# Patient Record
Sex: MALE | Race: WHITE | Employment: UNEMPLOYED | URBAN - METROPOLITAN AREA
[De-identification: names, ages, dates, MRNs, and addresses within clinical notes are randomized per-mention and may not be internally consistent; named-entity substitution may affect disease eponyms.]

---

## 2017-07-24 ENCOUNTER — OFFICE VISIT (OUTPATIENT)
Dept: ORTHOPEDIC SURGERY | Age: 42
End: 2017-07-24

## 2017-07-24 VITALS
BODY MASS INDEX: 22.91 KG/M2 | HEART RATE: 89 BPM | WEIGHT: 160.05 LBS | DIASTOLIC BLOOD PRESSURE: 84 MMHG | SYSTOLIC BLOOD PRESSURE: 125 MMHG | HEIGHT: 70 IN

## 2017-07-24 DIAGNOSIS — M25.562 LEFT KNEE PAIN, UNSPECIFIED CHRONICITY: ICD-10-CM

## 2017-07-24 DIAGNOSIS — S86.112A GASTROCNEMIUS MUSCLE STRAIN, LEFT, INITIAL ENCOUNTER: Primary | ICD-10-CM

## 2017-07-24 PROCEDURE — G8420 CALC BMI NORM PARAMETERS: HCPCS | Performed by: ORTHOPAEDIC SURGERY

## 2017-07-24 PROCEDURE — 4004F PT TOBACCO SCREEN RCVD TLK: CPT | Performed by: ORTHOPAEDIC SURGERY

## 2017-07-24 PROCEDURE — 73564 X-RAY EXAM KNEE 4 OR MORE: CPT | Performed by: ORTHOPAEDIC SURGERY

## 2017-07-24 PROCEDURE — L1812 KO ELASTIC W/JOINTS PRE OTS: HCPCS | Performed by: ORTHOPAEDIC SURGERY

## 2017-07-24 PROCEDURE — 99214 OFFICE O/P EST MOD 30 MIN: CPT | Performed by: ORTHOPAEDIC SURGERY

## 2017-07-24 PROCEDURE — G8427 DOCREV CUR MEDS BY ELIG CLIN: HCPCS | Performed by: ORTHOPAEDIC SURGERY

## 2017-07-24 RX ORDER — MELOXICAM 15 MG/1
15 TABLET ORAL DAILY
Qty: 30 TABLET | Refills: 3 | Status: SHIPPED | OUTPATIENT
Start: 2017-07-24 | End: 2017-10-01 | Stop reason: ALTCHOICE

## 2018-12-30 ENCOUNTER — HOSPITAL ENCOUNTER (EMERGENCY)
Age: 43
Discharge: ANOTHER HOSPITAL | End: 2018-12-30
Attending: EMERGENCY MEDICINE
Payer: COMMERCIAL

## 2018-12-30 ENCOUNTER — APPOINTMENT (OUTPATIENT)
Dept: CT IMAGING | Age: 43
End: 2018-12-30
Payer: COMMERCIAL

## 2018-12-30 ENCOUNTER — HOSPITAL ENCOUNTER (INPATIENT)
Age: 43
LOS: 2 days | Discharge: HOME | DRG: 247 | End: 2019-01-01
Attending: INTERNAL MEDICINE | Admitting: FAMILY MEDICINE
Payer: COMMERCIAL

## 2018-12-30 ENCOUNTER — APPOINTMENT (OUTPATIENT)
Dept: GENERAL RADIOLOGY | Age: 43
End: 2018-12-30
Payer: COMMERCIAL

## 2018-12-30 VITALS
HEART RATE: 60 BPM | WEIGHT: 156 LBS | TEMPERATURE: 98.2 F | OXYGEN SATURATION: 97 % | BODY MASS INDEX: 22.33 KG/M2 | DIASTOLIC BLOOD PRESSURE: 85 MMHG | SYSTOLIC BLOOD PRESSURE: 125 MMHG | HEIGHT: 70 IN | RESPIRATION RATE: 16 BRPM

## 2018-12-30 DIAGNOSIS — R07.9 CHEST PAIN, UNSPECIFIED TYPE: Primary | ICD-10-CM

## 2018-12-30 LAB
A/G RATIO: 1.4 (ref 1.1–2.2)
ALBUMIN SERPL-MCNC: 4.5 G/DL (ref 3.4–5)
ALP BLD-CCNC: 61 U/L (ref 40–129)
ALT SERPL-CCNC: 24 U/L (ref 10–40)
ANION GAP SERPL CALCULATED.3IONS-SCNC: 12 MMOL/L (ref 3–16)
APTT: 32.3 SEC (ref 26–36)
AST SERPL-CCNC: 25 U/L (ref 15–37)
BASOPHILS ABSOLUTE: 0 K/UL (ref 0–0.2)
BASOPHILS RELATIVE PERCENT: 0.5 %
BILIRUB SERPL-MCNC: 0.5 MG/DL (ref 0–1)
BUN BLDV-MCNC: 10 MG/DL (ref 7–20)
CALCIUM SERPL-MCNC: 9.8 MG/DL (ref 8.3–10.6)
CHLORIDE BLD-SCNC: 102 MMOL/L (ref 99–110)
CO2: 26 MMOL/L (ref 21–32)
CREAT SERPL-MCNC: 0.8 MG/DL (ref 0.9–1.3)
D DIMER: <200 NG/ML DDU (ref 0–229)
EKG ATRIAL RATE: 69 BPM
EKG ATRIAL RATE: 69 BPM
EKG DIAGNOSIS: NORMAL
EKG DIAGNOSIS: NORMAL
EKG P AXIS: 59 DEGREES
EKG P AXIS: 59 DEGREES
EKG P-R INTERVAL: 164 MS
EKG P-R INTERVAL: 164 MS
EKG Q-T INTERVAL: 374 MS
EKG Q-T INTERVAL: 378 MS
EKG QRS DURATION: 86 MS
EKG QRS DURATION: 88 MS
EKG QTC CALCULATION (BAZETT): 400 MS
EKG QTC CALCULATION (BAZETT): 405 MS
EKG R AXIS: 44 DEGREES
EKG R AXIS: 44 DEGREES
EKG T AXIS: 66 DEGREES
EKG T AXIS: 69 DEGREES
EKG VENTRICULAR RATE: 69 BPM
EKG VENTRICULAR RATE: 69 BPM
EOSINOPHILS ABSOLUTE: 0.1 K/UL (ref 0–0.6)
EOSINOPHILS RELATIVE PERCENT: 0.6 %
GFR AFRICAN AMERICAN: >60
GFR NON-AFRICAN AMERICAN: >60
GLOBULIN: 3.3 G/DL
GLUCOSE BLD-MCNC: 166 MG/DL (ref 70–99)
HCT VFR BLD CALC: 44.6 % (ref 40.5–52.5)
HEMOGLOBIN: 14.5 G/DL (ref 13.5–17.5)
INR BLD: 1.05 (ref 0.86–1.14)
LIPASE: 56 U/L (ref 13–60)
LYMPHOCYTES ABSOLUTE: 1.6 K/UL (ref 1–5.1)
LYMPHOCYTES RELATIVE PERCENT: 15.7 %
MCH RBC QN AUTO: 29.3 PG (ref 26–34)
MCHC RBC AUTO-ENTMCNC: 32.6 G/DL (ref 31–36)
MCV RBC AUTO: 89.9 FL (ref 80–100)
MONOCYTES ABSOLUTE: 1 K/UL (ref 0–1.3)
MONOCYTES RELATIVE PERCENT: 10.1 %
NEUTROPHILS ABSOLUTE: 7.4 K/UL (ref 1.7–7.7)
NEUTROPHILS RELATIVE PERCENT: 73.1 %
PDW BLD-RTO: 14.8 % (ref 12.4–15.4)
PLATELET # BLD: 187 K/UL (ref 135–450)
PMV BLD AUTO: 8.7 FL (ref 5–10.5)
POTASSIUM SERPL-SCNC: 4.4 MMOL/L (ref 3.5–5.1)
PROTHROMBIN TIME: 12 SEC (ref 9.8–13)
RBC # BLD: 4.96 M/UL (ref 4.2–5.9)
SODIUM BLD-SCNC: 140 MMOL/L (ref 136–145)
TOTAL PROTEIN: 7.8 G/DL (ref 6.4–8.2)
TROPONIN: 0.02 NG/ML
TROPONIN: 0.4 NG/ML
TROPONIN: 0.43 NG/ML
WBC # BLD: 10.1 K/UL (ref 4–11)

## 2018-12-30 PROCEDURE — 36415 COLL VENOUS BLD VENIPUNCTURE: CPT

## 2018-12-30 PROCEDURE — B2111ZZ FLUOROSCOPY OF MULTIPLE CORONARY ARTERIES USING LOW OSMOLAR CONTRAST: ICD-10-PCS | Performed by: INTERNAL MEDICINE

## 2018-12-30 PROCEDURE — 93458 L HRT ARTERY/VENTRICLE ANGIO: CPT | Performed by: INTERNAL MEDICINE

## 2018-12-30 PROCEDURE — 92941 PRQ TRLML REVSC TOT OCCL AMI: CPT | Performed by: INTERNAL MEDICINE

## 2018-12-30 PROCEDURE — 83036 HEMOGLOBIN GLYCOSYLATED A1C: CPT

## 2018-12-30 PROCEDURE — 96366 THER/PROPH/DIAG IV INF ADDON: CPT

## 2018-12-30 PROCEDURE — 80061 LIPID PANEL: CPT

## 2018-12-30 PROCEDURE — 84484 ASSAY OF TROPONIN QUANT: CPT

## 2018-12-30 PROCEDURE — 80053 COMPREHEN METABOLIC PANEL: CPT

## 2018-12-30 PROCEDURE — 92978 ENDOLUMINL IVUS OCT C 1ST: CPT | Performed by: INTERNAL MEDICINE

## 2018-12-30 PROCEDURE — 6360000002 HC RX W HCPCS: Performed by: NURSE PRACTITIONER

## 2018-12-30 PROCEDURE — 027135Z DILATION OF CORONARY ARTERY, TWO ARTERIES WITH TWO DRUG-ELUTING INTRALUMINAL DEVICES, PERCUTANEOUS APPROACH: ICD-10-PCS | Performed by: INTERNAL MEDICINE

## 2018-12-30 PROCEDURE — 99285 EMERGENCY DEPT VISIT HI MDM: CPT

## 2018-12-30 PROCEDURE — 6360000004 HC RX CONTRAST MEDICATION: Performed by: EMERGENCY MEDICINE

## 2018-12-30 PROCEDURE — 2580000003 HC RX 258: Performed by: EMERGENCY MEDICINE

## 2018-12-30 PROCEDURE — 71275 CT ANGIOGRAPHY CHEST: CPT

## 2018-12-30 PROCEDURE — 85379 FIBRIN DEGRADATION QUANT: CPT

## 2018-12-30 PROCEDURE — 99255 IP/OBS CONSLTJ NEW/EST HI 80: CPT | Performed by: INTERNAL MEDICINE

## 2018-12-30 PROCEDURE — 96375 TX/PRO/DX INJ NEW DRUG ADDON: CPT

## 2018-12-30 PROCEDURE — C1725 CATH, TRANSLUMIN NON-LASER: HCPCS

## 2018-12-30 PROCEDURE — 2000000000 HC ICU R&B

## 2018-12-30 PROCEDURE — 83690 ASSAY OF LIPASE: CPT

## 2018-12-30 PROCEDURE — 85610 PROTHROMBIN TIME: CPT

## 2018-12-30 PROCEDURE — 93005 ELECTROCARDIOGRAM TRACING: CPT | Performed by: INTERNAL MEDICINE

## 2018-12-30 PROCEDURE — C1874 STENT, COATED/COV W/DEL SYS: HCPCS

## 2018-12-30 PROCEDURE — 71045 X-RAY EXAM CHEST 1 VIEW: CPT

## 2018-12-30 PROCEDURE — 99406 BEHAV CHNG SMOKING 3-10 MIN: CPT

## 2018-12-30 PROCEDURE — 2580000003 HC RX 258: Performed by: INTERNAL MEDICINE

## 2018-12-30 PROCEDURE — C1894 INTRO/SHEATH, NON-LASER: HCPCS

## 2018-12-30 PROCEDURE — C1887 CATHETER, GUIDING: HCPCS

## 2018-12-30 PROCEDURE — 2709999900 HC NON-CHARGEABLE SUPPLY

## 2018-12-30 PROCEDURE — 6360000002 HC RX W HCPCS: Performed by: EMERGENCY MEDICINE

## 2018-12-30 PROCEDURE — 99152 MOD SED SAME PHYS/QHP 5/>YRS: CPT | Performed by: INTERNAL MEDICINE

## 2018-12-30 PROCEDURE — 85730 THROMBOPLASTIN TIME PARTIAL: CPT

## 2018-12-30 PROCEDURE — 93005 ELECTROCARDIOGRAM TRACING: CPT | Performed by: EMERGENCY MEDICINE

## 2018-12-30 PROCEDURE — 93010 ELECTROCARDIOGRAM REPORT: CPT | Performed by: INTERNAL MEDICINE

## 2018-12-30 PROCEDURE — 6360000002 HC RX W HCPCS: Performed by: INTERNAL MEDICINE

## 2018-12-30 PROCEDURE — 6370000000 HC RX 637 (ALT 250 FOR IP): Performed by: INTERNAL MEDICINE

## 2018-12-30 PROCEDURE — 6370000000 HC RX 637 (ALT 250 FOR IP): Performed by: EMERGENCY MEDICINE

## 2018-12-30 PROCEDURE — B2151ZZ FLUOROSCOPY OF LEFT HEART USING LOW OSMOLAR CONTRAST: ICD-10-PCS | Performed by: INTERNAL MEDICINE

## 2018-12-30 PROCEDURE — C1769 GUIDE WIRE: HCPCS

## 2018-12-30 PROCEDURE — 2720000010 HC SURG SUPPLY STERILE

## 2018-12-30 PROCEDURE — 4A023N7 MEASUREMENT OF CARDIAC SAMPLING AND PRESSURE, LEFT HEART, PERCUTANEOUS APPROACH: ICD-10-PCS | Performed by: INTERNAL MEDICINE

## 2018-12-30 PROCEDURE — 85025 COMPLETE CBC W/AUTO DIFF WBC: CPT

## 2018-12-30 PROCEDURE — 96376 TX/PRO/DX INJ SAME DRUG ADON: CPT

## 2018-12-30 PROCEDURE — 85347 COAGULATION TIME ACTIVATED: CPT

## 2018-12-30 PROCEDURE — 96365 THER/PROPH/DIAG IV INF INIT: CPT

## 2018-12-30 PROCEDURE — 6370000000 HC RX 637 (ALT 250 FOR IP): Performed by: NURSE PRACTITIONER

## 2018-12-30 RX ORDER — SODIUM CHLORIDE 9 MG/ML
INJECTION, SOLUTION INTRAVENOUS CONTINUOUS
Status: DISPENSED | OUTPATIENT
Start: 2018-12-30 | End: 2018-12-31

## 2018-12-30 RX ORDER — NICOTINE 21 MG/24HR
1 PATCH, TRANSDERMAL 24 HOURS TRANSDERMAL DAILY
Status: DISCONTINUED | OUTPATIENT
Start: 2018-12-30 | End: 2019-01-01 | Stop reason: HOSPADM

## 2018-12-30 RX ORDER — EPTIFIBATIDE 0.75 MG/ML
11.3 INJECTION, SOLUTION INTRAVENOUS CONTINUOUS
Status: DISPENSED | OUTPATIENT
Start: 2018-12-30 | End: 2018-12-31

## 2018-12-30 RX ORDER — SODIUM CHLORIDE 0.9 % (FLUSH) 0.9 %
10 SYRINGE (ML) INJECTION PRN
Status: DISCONTINUED | OUTPATIENT
Start: 2018-12-30 | End: 2019-01-01 | Stop reason: HOSPADM

## 2018-12-30 RX ORDER — LORAZEPAM 1 MG/1
3 TABLET ORAL
Status: DISCONTINUED | OUTPATIENT
Start: 2018-12-30 | End: 2019-01-01 | Stop reason: HOSPADM

## 2018-12-30 RX ORDER — MORPHINE SULFATE 4 MG/ML
4 INJECTION, SOLUTION INTRAMUSCULAR; INTRAVENOUS ONCE
Status: COMPLETED | OUTPATIENT
Start: 2018-12-30 | End: 2018-12-30

## 2018-12-30 RX ORDER — SODIUM CHLORIDE 9 MG/ML
INJECTION, SOLUTION INTRAVENOUS CONTINUOUS
Status: DISCONTINUED | OUTPATIENT
Start: 2018-12-30 | End: 2018-12-30 | Stop reason: HOSPADM

## 2018-12-30 RX ORDER — LORAZEPAM 1 MG/1
4 TABLET ORAL
Status: DISCONTINUED | OUTPATIENT
Start: 2018-12-30 | End: 2019-01-01 | Stop reason: HOSPADM

## 2018-12-30 RX ORDER — HEPARIN SODIUM 1000 [USP'U]/ML
60 INJECTION, SOLUTION INTRAVENOUS; SUBCUTANEOUS ONCE
Status: COMPLETED | OUTPATIENT
Start: 2018-12-30 | End: 2018-12-30

## 2018-12-30 RX ORDER — MORPHINE SULFATE 2 MG/ML
2 INJECTION, SOLUTION INTRAMUSCULAR; INTRAVENOUS
Status: DISCONTINUED | OUTPATIENT
Start: 2018-12-30 | End: 2019-01-01 | Stop reason: HOSPADM

## 2018-12-30 RX ORDER — LORAZEPAM 2 MG/ML
3 INJECTION INTRAMUSCULAR
Status: DISCONTINUED | OUTPATIENT
Start: 2018-12-30 | End: 2019-01-01 | Stop reason: HOSPADM

## 2018-12-30 RX ORDER — ATORVASTATIN CALCIUM 40 MG/1
40 TABLET, FILM COATED ORAL NIGHTLY
Status: DISCONTINUED | OUTPATIENT
Start: 2018-12-30 | End: 2018-12-31

## 2018-12-30 RX ORDER — LORAZEPAM 2 MG/ML
2 INJECTION INTRAMUSCULAR
Status: DISCONTINUED | OUTPATIENT
Start: 2018-12-30 | End: 2019-01-01 | Stop reason: HOSPADM

## 2018-12-30 RX ORDER — SODIUM CHLORIDE 0.9 % (FLUSH) 0.9 %
10 SYRINGE (ML) INJECTION EVERY 12 HOURS SCHEDULED
Status: DISCONTINUED | OUTPATIENT
Start: 2018-12-30 | End: 2019-01-01 | Stop reason: HOSPADM

## 2018-12-30 RX ORDER — LORAZEPAM 1 MG/1
1 TABLET ORAL
Status: DISCONTINUED | OUTPATIENT
Start: 2018-12-30 | End: 2019-01-01 | Stop reason: HOSPADM

## 2018-12-30 RX ORDER — METOPROLOL TARTRATE 50 MG/1
25 TABLET, FILM COATED ORAL ONCE
Status: DISCONTINUED | OUTPATIENT
Start: 2018-12-30 | End: 2018-12-30

## 2018-12-30 RX ORDER — ASPIRIN 81 MG/1
324 TABLET, CHEWABLE ORAL ONCE
Status: COMPLETED | OUTPATIENT
Start: 2018-12-30 | End: 2018-12-30

## 2018-12-30 RX ORDER — MORPHINE SULFATE 10 MG/ML
INJECTION, SOLUTION INTRAMUSCULAR; INTRAVENOUS
Status: DISCONTINUED
Start: 2018-12-30 | End: 2018-12-30 | Stop reason: HOSPADM

## 2018-12-30 RX ORDER — MORPHINE SULFATE 2 MG/ML
2 INJECTION, SOLUTION INTRAMUSCULAR; INTRAVENOUS ONCE
Status: COMPLETED | OUTPATIENT
Start: 2018-12-30 | End: 2018-12-30

## 2018-12-30 RX ORDER — HEPARIN SODIUM 1000 [USP'U]/ML
30 INJECTION, SOLUTION INTRAVENOUS; SUBCUTANEOUS PRN
Status: DISCONTINUED | OUTPATIENT
Start: 2018-12-30 | End: 2018-12-30 | Stop reason: HOSPADM

## 2018-12-30 RX ORDER — LORAZEPAM 1 MG/1
2 TABLET ORAL
Status: DISCONTINUED | OUTPATIENT
Start: 2018-12-30 | End: 2019-01-01 | Stop reason: HOSPADM

## 2018-12-30 RX ORDER — ATORVASTATIN CALCIUM 40 MG/1
40 TABLET, FILM COATED ORAL ONCE
Status: COMPLETED | OUTPATIENT
Start: 2018-12-30 | End: 2018-12-30

## 2018-12-30 RX ORDER — ONDANSETRON 2 MG/ML
4 INJECTION INTRAMUSCULAR; INTRAVENOUS ONCE
Status: COMPLETED | OUTPATIENT
Start: 2018-12-30 | End: 2018-12-30

## 2018-12-30 RX ORDER — HEPARIN SODIUM 1000 [USP'U]/ML
60 INJECTION, SOLUTION INTRAVENOUS; SUBCUTANEOUS PRN
Status: DISCONTINUED | OUTPATIENT
Start: 2018-12-30 | End: 2018-12-30 | Stop reason: HOSPADM

## 2018-12-30 RX ORDER — LORAZEPAM 2 MG/ML
1 INJECTION INTRAMUSCULAR
Status: DISCONTINUED | OUTPATIENT
Start: 2018-12-30 | End: 2019-01-01 | Stop reason: HOSPADM

## 2018-12-30 RX ORDER — ASPIRIN 81 MG/1
81 TABLET, CHEWABLE ORAL DAILY
Status: DISCONTINUED | OUTPATIENT
Start: 2018-12-31 | End: 2019-01-01 | Stop reason: HOSPADM

## 2018-12-30 RX ORDER — LORAZEPAM 2 MG/ML
4 INJECTION INTRAMUSCULAR
Status: DISCONTINUED | OUTPATIENT
Start: 2018-12-30 | End: 2019-01-01 | Stop reason: HOSPADM

## 2018-12-30 RX ORDER — NITROGLYCERIN 0.4 MG/1
0.4 TABLET SUBLINGUAL EVERY 5 MIN PRN
Status: DISCONTINUED | OUTPATIENT
Start: 2018-12-30 | End: 2018-12-30 | Stop reason: HOSPADM

## 2018-12-30 RX ADMIN — IOPAMIDOL 75 ML: 755 INJECTION, SOLUTION INTRAVENOUS at 15:01

## 2018-12-30 RX ADMIN — SODIUM CHLORIDE: 9 INJECTION, SOLUTION INTRAVENOUS at 14:23

## 2018-12-30 RX ADMIN — METOPROLOL TARTRATE 25 MG: 25 TABLET ORAL at 20:31

## 2018-12-30 RX ADMIN — MORPHINE SULFATE 2 MG: 2 INJECTION, SOLUTION INTRAMUSCULAR; INTRAVENOUS at 22:49

## 2018-12-30 RX ADMIN — MORPHINE SULFATE 4 MG: 4 INJECTION, SOLUTION INTRAMUSCULAR; INTRAVENOUS at 15:58

## 2018-12-30 RX ADMIN — TICAGRELOR 180 MG: 90 TABLET ORAL at 19:00

## 2018-12-30 RX ADMIN — ONDANSETRON 4 MG: 2 INJECTION INTRAMUSCULAR; INTRAVENOUS at 15:58

## 2018-12-30 RX ADMIN — NITROGLYCERIN 0.4 MG: 0.4 TABLET SUBLINGUAL at 14:23

## 2018-12-30 RX ADMIN — SODIUM CHLORIDE: 9 INJECTION, SOLUTION INTRAVENOUS at 20:05

## 2018-12-30 RX ADMIN — MORPHINE SULFATE 2 MG: 2 INJECTION, SOLUTION INTRAMUSCULAR; INTRAVENOUS at 20:31

## 2018-12-30 RX ADMIN — EPTIFIBATIDE 11.3 ML/HR: 0.75 INJECTION, SOLUTION INTRAVENOUS at 21:56

## 2018-12-30 RX ADMIN — TICAGRELOR 90 MG: 90 TABLET ORAL at 20:31

## 2018-12-30 RX ADMIN — ASPIRIN 81 MG 324 MG: 81 TABLET ORAL at 14:23

## 2018-12-30 RX ADMIN — ATORVASTATIN CALCIUM 40 MG: 40 TABLET, FILM COATED ORAL at 20:31

## 2018-12-30 RX ADMIN — NITROGLYCERIN 0.5 INCH: 20 OINTMENT TOPICAL at 16:06

## 2018-12-30 RX ADMIN — NITROGLYCERIN 0.4 MG: 0.4 TABLET SUBLINGUAL at 14:33

## 2018-12-30 RX ADMIN — HEPARIN SODIUM 4250 UNITS: 1000 INJECTION, SOLUTION INTRAVENOUS; SUBCUTANEOUS at 16:14

## 2018-12-30 RX ADMIN — HEPARIN SODIUM 12 UNITS/KG/HR: 10000 INJECTION, SOLUTION INTRAVENOUS at 16:14

## 2018-12-30 RX ADMIN — ATORVASTATIN CALCIUM 40 MG: 40 TABLET, FILM COATED ORAL at 15:46

## 2018-12-30 RX ADMIN — NITROGLYCERIN 0.4 MG: 0.4 TABLET SUBLINGUAL at 14:44

## 2018-12-30 RX ADMIN — MORPHINE SULFATE 2 MG: 2 INJECTION, SOLUTION INTRAMUSCULAR; INTRAVENOUS at 17:29

## 2018-12-30 ASSESSMENT — ENCOUNTER SYMPTOMS
EYE DISCHARGE: 0
COUGH: 0
DIARRHEA: 0
SHORTNESS OF BREATH: 0
EYE REDNESS: 0
VOMITING: 0
BACK PAIN: 0
RHINORRHEA: 0
ABDOMINAL PAIN: 0

## 2018-12-30 ASSESSMENT — PAIN DESCRIPTION - PROGRESSION: CLINICAL_PROGRESSION: NOT CHANGED

## 2018-12-30 ASSESSMENT — PAIN SCALES - GENERAL
PAINLEVEL_OUTOF10: 4
PAINLEVEL_OUTOF10: 4
PAINLEVEL_OUTOF10: 6
PAINLEVEL_OUTOF10: 4

## 2018-12-30 ASSESSMENT — PAIN DESCRIPTION - LOCATION
LOCATION: CHEST
LOCATION: CHEST

## 2018-12-30 ASSESSMENT — PAIN DESCRIPTION - PAIN TYPE
TYPE: ACUTE PAIN
TYPE: ACUTE PAIN

## 2018-12-30 ASSESSMENT — PAIN DESCRIPTION - DESCRIPTORS: DESCRIPTORS: SHARP

## 2018-12-30 ASSESSMENT — PAIN DESCRIPTION - ORIENTATION
ORIENTATION: RIGHT
ORIENTATION: RIGHT

## 2018-12-31 PROBLEM — I20.89 OTHER FORMS OF ANGINA PECTORIS: Status: ACTIVE | Noted: 2018-12-30

## 2018-12-31 PROBLEM — R07.9 CHEST PAIN: Status: RESOLVED | Noted: 2018-12-30 | Resolved: 2018-12-31

## 2018-12-31 PROBLEM — Z78.9 ALCOHOL USE: Chronic | Status: ACTIVE | Noted: 2018-12-31

## 2018-12-31 PROBLEM — F10.90 ALCOHOL USE: Chronic | Status: ACTIVE | Noted: 2018-12-31

## 2018-12-31 PROBLEM — I20.8 OTHER FORMS OF ANGINA PECTORIS (HCC): Status: ACTIVE | Noted: 2018-12-30

## 2018-12-31 PROBLEM — F14.10 COCAINE ABUSE (HCC): Status: ACTIVE | Noted: 2018-12-31

## 2018-12-31 PROBLEM — F32.A ANXIETY AND DEPRESSION: Chronic | Status: ACTIVE | Noted: 2018-12-31

## 2018-12-31 PROBLEM — F41.9 ANXIETY AND DEPRESSION: Chronic | Status: ACTIVE | Noted: 2018-12-31

## 2018-12-31 LAB
ALBUMIN SERPL-MCNC: 3.2 G/DL (ref 3.4–5)
AMPHETAMINE SCREEN, URINE: ABNORMAL
ANION GAP SERPL CALCULATED.3IONS-SCNC: 9 MMOL/L (ref 3–16)
BARBITURATE SCREEN URINE: ABNORMAL
BASOPHILS ABSOLUTE: 0 K/UL (ref 0–0.2)
BASOPHILS RELATIVE PERCENT: 0.3 %
BENZODIAZEPINE SCREEN, URINE: POSITIVE
BILIRUBIN URINE: NEGATIVE
BLOOD, URINE: NEGATIVE
BUN BLDV-MCNC: 8 MG/DL (ref 7–20)
CALCIUM SERPL-MCNC: 8.3 MG/DL (ref 8.3–10.6)
CANNABINOID SCREEN URINE: ABNORMAL
CHLORIDE BLD-SCNC: 105 MMOL/L (ref 99–110)
CHOLESTEROL, TOTAL: 150 MG/DL (ref 0–199)
CHOLESTEROL, TOTAL: 165 MG/DL (ref 0–199)
CLARITY: CLEAR
CO2: 24 MMOL/L (ref 21–32)
COCAINE METABOLITE SCREEN URINE: POSITIVE
COLOR: YELLOW
CREAT SERPL-MCNC: 0.7 MG/DL (ref 0.9–1.3)
EKG ATRIAL RATE: 56 BPM
EKG ATRIAL RATE: 59 BPM
EKG ATRIAL RATE: 80 BPM
EKG DIAGNOSIS: NORMAL
EKG P AXIS: 51 DEGREES
EKG P AXIS: 53 DEGREES
EKG P AXIS: 54 DEGREES
EKG P-R INTERVAL: 150 MS
EKG P-R INTERVAL: 166 MS
EKG P-R INTERVAL: 184 MS
EKG Q-T INTERVAL: 370 MS
EKG Q-T INTERVAL: 406 MS
EKG Q-T INTERVAL: 414 MS
EKG QRS DURATION: 82 MS
EKG QRS DURATION: 86 MS
EKG QRS DURATION: 88 MS
EKG QTC CALCULATION (BAZETT): 399 MS
EKG QTC CALCULATION (BAZETT): 401 MS
EKG QTC CALCULATION (BAZETT): 426 MS
EKG R AXIS: 19 DEGREES
EKG R AXIS: 29 DEGREES
EKG R AXIS: 6 DEGREES
EKG T AXIS: 58 DEGREES
EKG T AXIS: 63 DEGREES
EKG T AXIS: 66 DEGREES
EKG VENTRICULAR RATE: 56 BPM
EKG VENTRICULAR RATE: 59 BPM
EKG VENTRICULAR RATE: 80 BPM
EOSINOPHILS ABSOLUTE: 0.1 K/UL (ref 0–0.6)
EOSINOPHILS RELATIVE PERCENT: 1.3 %
ESTIMATED AVERAGE GLUCOSE: 102.5 MG/DL
ESTIMATED AVERAGE GLUCOSE: 102.5 MG/DL
ETHANOL: NORMAL MG/DL (ref 0–0.08)
GFR AFRICAN AMERICAN: >60
GFR NON-AFRICAN AMERICAN: >60
GLUCOSE BLD-MCNC: 107 MG/DL (ref 70–99)
GLUCOSE URINE: NEGATIVE MG/DL
HBA1C MFR BLD: 5.2 %
HBA1C MFR BLD: 5.2 %
HCT VFR BLD CALC: 37.9 % (ref 40.5–52.5)
HDLC SERPL-MCNC: 49 MG/DL (ref 40–60)
HDLC SERPL-MCNC: 58 MG/DL (ref 40–60)
HEMOGLOBIN: 12.4 G/DL (ref 13.5–17.5)
KETONES, URINE: NEGATIVE MG/DL
LDL CHOLESTEROL CALCULATED: 62 MG/DL
LDL CHOLESTEROL CALCULATED: 86 MG/DL
LEUKOCYTE ESTERASE, URINE: NEGATIVE
LYMPHOCYTES ABSOLUTE: 1.9 K/UL (ref 1–5.1)
LYMPHOCYTES RELATIVE PERCENT: 20.1 %
Lab: ABNORMAL
MAGNESIUM: 1.9 MG/DL (ref 1.8–2.4)
MCH RBC QN AUTO: 29.8 PG (ref 26–34)
MCHC RBC AUTO-ENTMCNC: 32.7 G/DL (ref 31–36)
MCV RBC AUTO: 91 FL (ref 80–100)
METHADONE SCREEN, URINE: ABNORMAL
MICROSCOPIC EXAMINATION: NORMAL
MONOCYTES ABSOLUTE: 1 K/UL (ref 0–1.3)
MONOCYTES RELATIVE PERCENT: 10.5 %
NEUTROPHILS ABSOLUTE: 6.4 K/UL (ref 1.7–7.7)
NEUTROPHILS RELATIVE PERCENT: 67.8 %
NITRITE, URINE: NEGATIVE
OPIATE SCREEN URINE: POSITIVE
OXYCODONE URINE: ABNORMAL
PDW BLD-RTO: 14.6 % (ref 12.4–15.4)
PH UA: 7
PH UA: 7
PHENCYCLIDINE SCREEN URINE: ABNORMAL
PHOSPHORUS: 2.8 MG/DL (ref 2.5–4.9)
PLATELET # BLD: 152 K/UL (ref 135–450)
PMV BLD AUTO: 8.9 FL (ref 5–10.5)
POC ACT LR: 176 SEC
POC ACT LR: 229 SEC
POC ACT LR: 243 SEC
POTASSIUM SERPL-SCNC: 4 MMOL/L (ref 3.5–5.1)
PROPOXYPHENE SCREEN: ABNORMAL
PROTEIN UA: NEGATIVE MG/DL
RBC # BLD: 4.16 M/UL (ref 4.2–5.9)
SODIUM BLD-SCNC: 138 MMOL/L (ref 136–145)
SPECIFIC GRAVITY UA: 1.01
TRIGL SERPL-MCNC: 106 MG/DL (ref 0–150)
TRIGL SERPL-MCNC: 194 MG/DL (ref 0–150)
TROPONIN: 0.26 NG/ML
TROPONIN: 0.3 NG/ML
TSH REFLEX: 2.31 UIU/ML (ref 0.27–4.2)
URINE REFLEX TO CULTURE: NORMAL
URINE TYPE: NORMAL
UROBILINOGEN, URINE: 1 E.U./DL
VITAMIN B-12: 640 PG/ML (ref 211–911)
VITAMIN D 25-HYDROXY: 15 NG/ML
VLDLC SERPL CALC-MCNC: 21 MG/DL
VLDLC SERPL CALC-MCNC: 39 MG/DL
WBC # BLD: 9.5 K/UL (ref 4–11)

## 2018-12-31 PROCEDURE — 2580000003 HC RX 258: Performed by: HOSPITALIST

## 2018-12-31 PROCEDURE — 81003 URINALYSIS AUTO W/O SCOPE: CPT

## 2018-12-31 PROCEDURE — 80307 DRUG TEST PRSMV CHEM ANLYZR: CPT

## 2018-12-31 PROCEDURE — 6370000000 HC RX 637 (ALT 250 FOR IP): Performed by: HOSPITALIST

## 2018-12-31 PROCEDURE — 6370000000 HC RX 637 (ALT 250 FOR IP): Performed by: INTERNAL MEDICINE

## 2018-12-31 PROCEDURE — 82607 VITAMIN B-12: CPT

## 2018-12-31 PROCEDURE — 80061 LIPID PANEL: CPT

## 2018-12-31 PROCEDURE — 2060000000 HC ICU INTERMEDIATE R&B

## 2018-12-31 PROCEDURE — 83735 ASSAY OF MAGNESIUM: CPT

## 2018-12-31 PROCEDURE — 84443 ASSAY THYROID STIM HORMONE: CPT

## 2018-12-31 PROCEDURE — 84484 ASSAY OF TROPONIN QUANT: CPT

## 2018-12-31 PROCEDURE — G0480 DRUG TEST DEF 1-7 CLASSES: HCPCS

## 2018-12-31 PROCEDURE — 85025 COMPLETE CBC W/AUTO DIFF WBC: CPT

## 2018-12-31 PROCEDURE — 2500000003 HC RX 250 WO HCPCS: Performed by: HOSPITALIST

## 2018-12-31 PROCEDURE — 6360000002 HC RX W HCPCS: Performed by: INTERNAL MEDICINE

## 2018-12-31 PROCEDURE — 99024 POSTOP FOLLOW-UP VISIT: CPT | Performed by: INTERNAL MEDICINE

## 2018-12-31 PROCEDURE — 36415 COLL VENOUS BLD VENIPUNCTURE: CPT

## 2018-12-31 PROCEDURE — 80069 RENAL FUNCTION PANEL: CPT

## 2018-12-31 PROCEDURE — 2580000003 HC RX 258: Performed by: INTERNAL MEDICINE

## 2018-12-31 PROCEDURE — 6360000002 HC RX W HCPCS: Performed by: HOSPITALIST

## 2018-12-31 PROCEDURE — 82306 VITAMIN D 25 HYDROXY: CPT

## 2018-12-31 PROCEDURE — 93010 ELECTROCARDIOGRAM REPORT: CPT | Performed by: INTERNAL MEDICINE

## 2018-12-31 PROCEDURE — 83036 HEMOGLOBIN GLYCOSYLATED A1C: CPT

## 2018-12-31 PROCEDURE — 99254 IP/OBS CNSLTJ NEW/EST MOD 60: CPT | Performed by: INTERNAL MEDICINE

## 2018-12-31 RX ORDER — THIAMINE MONONITRATE (VIT B1) 100 MG
100 TABLET ORAL DAILY
Status: DISCONTINUED | OUTPATIENT
Start: 2019-01-01 | End: 2019-01-01 | Stop reason: HOSPADM

## 2018-12-31 RX ORDER — ONDANSETRON 2 MG/ML
4 INJECTION INTRAMUSCULAR; INTRAVENOUS EVERY 6 HOURS PRN
Status: DISCONTINUED | OUTPATIENT
Start: 2018-12-31 | End: 2019-01-01 | Stop reason: HOSPADM

## 2018-12-31 RX ORDER — CARVEDILOL 3.12 MG/1
3.12 TABLET ORAL 2 TIMES DAILY WITH MEALS
Status: DISCONTINUED | OUTPATIENT
Start: 2018-12-31 | End: 2019-01-01 | Stop reason: HOSPADM

## 2018-12-31 RX ORDER — CARVEDILOL 6.25 MG/1
6.25 TABLET ORAL 2 TIMES DAILY WITH MEALS
Status: DISCONTINUED | OUTPATIENT
Start: 2018-12-31 | End: 2018-12-31

## 2018-12-31 RX ORDER — HYDRALAZINE HYDROCHLORIDE 20 MG/ML
10 INJECTION INTRAMUSCULAR; INTRAVENOUS EVERY 4 HOURS PRN
Status: DISCONTINUED | OUTPATIENT
Start: 2018-12-31 | End: 2019-01-01 | Stop reason: HOSPADM

## 2018-12-31 RX ORDER — ACETAMINOPHEN 325 MG/1
650 TABLET ORAL EVERY 4 HOURS PRN
Status: DISCONTINUED | OUTPATIENT
Start: 2018-12-31 | End: 2019-01-01 | Stop reason: HOSPADM

## 2018-12-31 RX ADMIN — ACETAMINOPHEN 650 MG: 325 TABLET ORAL at 16:07

## 2018-12-31 RX ADMIN — Medication 10 ML: at 20:21

## 2018-12-31 RX ADMIN — ASPIRIN 81 MG 81 MG: 81 TABLET ORAL at 08:29

## 2018-12-31 RX ADMIN — FOLIC ACID: 5 INJECTION, SOLUTION INTRAMUSCULAR; INTRAVENOUS; SUBCUTANEOUS at 10:27

## 2018-12-31 RX ADMIN — TICAGRELOR 90 MG: 90 TABLET ORAL at 20:20

## 2018-12-31 RX ADMIN — NITROGLYCERIN 1 INCH: 20 OINTMENT TOPICAL at 08:29

## 2018-12-31 RX ADMIN — MORPHINE SULFATE 2 MG: 2 INJECTION, SOLUTION INTRAMUSCULAR; INTRAVENOUS at 01:21

## 2018-12-31 RX ADMIN — MORPHINE SULFATE 2 MG: 2 INJECTION, SOLUTION INTRAMUSCULAR; INTRAVENOUS at 10:42

## 2018-12-31 RX ADMIN — TICAGRELOR 90 MG: 90 TABLET ORAL at 08:29

## 2018-12-31 RX ADMIN — Medication 10 ML: at 08:31

## 2018-12-31 RX ADMIN — SODIUM CHLORIDE, PRESERVATIVE FREE 10 ML: 5 INJECTION INTRAVENOUS at 10:42

## 2018-12-31 RX ADMIN — CARVEDILOL 3.12 MG: 3.12 TABLET, FILM COATED ORAL at 08:30

## 2018-12-31 RX ADMIN — CARVEDILOL 3.12 MG: 3.12 TABLET, FILM COATED ORAL at 16:23

## 2018-12-31 ASSESSMENT — PAIN SCALES - GENERAL
PAINLEVEL_OUTOF10: 5
PAINLEVEL_OUTOF10: 3
PAINLEVEL_OUTOF10: 0
PAINLEVEL_OUTOF10: 4
PAINLEVEL_OUTOF10: 2

## 2018-12-31 ASSESSMENT — PAIN DESCRIPTION - FREQUENCY: FREQUENCY: CONTINUOUS

## 2018-12-31 ASSESSMENT — PAIN DESCRIPTION - ORIENTATION: ORIENTATION: LEFT;MID

## 2018-12-31 ASSESSMENT — PAIN DESCRIPTION - DESCRIPTORS: DESCRIPTORS: THROBBING;PRESSURE

## 2018-12-31 ASSESSMENT — PAIN DESCRIPTION - LOCATION: LOCATION: CHEST

## 2018-12-31 ASSESSMENT — PAIN DESCRIPTION - PAIN TYPE: TYPE: ACUTE PAIN

## 2018-12-31 ASSESSMENT — PAIN DESCRIPTION - ONSET: ONSET: ON-GOING

## 2019-01-01 VITALS
SYSTOLIC BLOOD PRESSURE: 132 MMHG | HEIGHT: 70 IN | WEIGHT: 159.61 LBS | HEART RATE: 66 BPM | TEMPERATURE: 98 F | OXYGEN SATURATION: 98 % | BODY MASS INDEX: 22.85 KG/M2 | RESPIRATION RATE: 16 BRPM | DIASTOLIC BLOOD PRESSURE: 78 MMHG

## 2019-01-01 LAB
A/G RATIO: 1.2 (ref 1.1–2.2)
ALBUMIN SERPL-MCNC: 3.4 G/DL (ref 3.4–5)
ALP BLD-CCNC: 45 U/L (ref 40–129)
ALT SERPL-CCNC: 14 U/L (ref 10–40)
ANION GAP SERPL CALCULATED.3IONS-SCNC: 9 MMOL/L (ref 3–16)
AST SERPL-CCNC: 16 U/L (ref 15–37)
BASOPHILS ABSOLUTE: 0 K/UL (ref 0–0.2)
BASOPHILS RELATIVE PERCENT: 0.3 %
BILIRUB SERPL-MCNC: <0.2 MG/DL (ref 0–1)
BUN BLDV-MCNC: 7 MG/DL (ref 7–20)
CALCIUM SERPL-MCNC: 8.6 MG/DL (ref 8.3–10.6)
CHLORIDE BLD-SCNC: 105 MMOL/L (ref 99–110)
CO2: 23 MMOL/L (ref 21–32)
CREAT SERPL-MCNC: 0.6 MG/DL (ref 0.9–1.3)
EOSINOPHILS ABSOLUTE: 0.2 K/UL (ref 0–0.6)
EOSINOPHILS RELATIVE PERCENT: 2 %
GFR AFRICAN AMERICAN: >60
GFR NON-AFRICAN AMERICAN: >60
GLOBULIN: 2.8 G/DL
GLUCOSE BLD-MCNC: 94 MG/DL (ref 70–99)
HCT VFR BLD CALC: 38.3 % (ref 40.5–52.5)
HEMOGLOBIN: 12.6 G/DL (ref 13.5–17.5)
LYMPHOCYTES ABSOLUTE: 1.6 K/UL (ref 1–5.1)
LYMPHOCYTES RELATIVE PERCENT: 18.8 %
MAGNESIUM: 1.7 MG/DL (ref 1.8–2.4)
MCH RBC QN AUTO: 29.9 PG (ref 26–34)
MCHC RBC AUTO-ENTMCNC: 33 G/DL (ref 31–36)
MCV RBC AUTO: 90.7 FL (ref 80–100)
MONOCYTES ABSOLUTE: 0.8 K/UL (ref 0–1.3)
MONOCYTES RELATIVE PERCENT: 10.1 %
NEUTROPHILS ABSOLUTE: 5.8 K/UL (ref 1.7–7.7)
NEUTROPHILS RELATIVE PERCENT: 68.8 %
PDW BLD-RTO: 14.6 % (ref 12.4–15.4)
PHOSPHORUS: 2.2 MG/DL (ref 2.5–4.9)
PLATELET # BLD: 162 K/UL (ref 135–450)
PMV BLD AUTO: 9.3 FL (ref 5–10.5)
POTASSIUM SERPL-SCNC: 4.1 MMOL/L (ref 3.5–5.1)
RBC # BLD: 4.22 M/UL (ref 4.2–5.9)
SODIUM BLD-SCNC: 137 MMOL/L (ref 136–145)
TOTAL PROTEIN: 6.2 G/DL (ref 6.4–8.2)
WBC # BLD: 8.4 K/UL (ref 4–11)

## 2019-01-01 PROCEDURE — 84100 ASSAY OF PHOSPHORUS: CPT

## 2019-01-01 PROCEDURE — 6360000002 HC RX W HCPCS: Performed by: STUDENT IN AN ORGANIZED HEALTH CARE EDUCATION/TRAINING PROGRAM

## 2019-01-01 PROCEDURE — 6370000000 HC RX 637 (ALT 250 FOR IP): Performed by: HOSPITALIST

## 2019-01-01 PROCEDURE — 80053 COMPREHEN METABOLIC PANEL: CPT

## 2019-01-01 PROCEDURE — 99233 SBSQ HOSP IP/OBS HIGH 50: CPT | Performed by: INTERNAL MEDICINE

## 2019-01-01 PROCEDURE — 83735 ASSAY OF MAGNESIUM: CPT

## 2019-01-01 PROCEDURE — 85025 COMPLETE CBC W/AUTO DIFF WBC: CPT

## 2019-01-01 PROCEDURE — 2580000003 HC RX 258: Performed by: INTERNAL MEDICINE

## 2019-01-01 PROCEDURE — 6370000000 HC RX 637 (ALT 250 FOR IP): Performed by: INTERNAL MEDICINE

## 2019-01-01 PROCEDURE — 36415 COLL VENOUS BLD VENIPUNCTURE: CPT

## 2019-01-01 RX ORDER — ASPIRIN 81 MG/1
81 TABLET, CHEWABLE ORAL DAILY
Qty: 30 TABLET | Refills: 0 | Status: SHIPPED | OUTPATIENT
Start: 2019-01-02

## 2019-01-01 RX ORDER — ERGOCALCIFEROL (VITAMIN D2) 1250 MCG
50000 CAPSULE ORAL WEEKLY
Qty: 4 CAPSULE | Refills: 0 | Status: SHIPPED | OUTPATIENT
Start: 2019-01-01 | End: 2019-01-23 | Stop reason: SDUPTHER

## 2019-01-01 RX ORDER — CARVEDILOL 3.12 MG/1
3.12 TABLET ORAL 2 TIMES DAILY WITH MEALS
Qty: 60 TABLET | Refills: 0 | Status: SHIPPED | OUTPATIENT
Start: 2019-01-01 | End: 2019-01-23 | Stop reason: SDUPTHER

## 2019-01-01 RX ORDER — MAGNESIUM SULFATE 1 G/100ML
1 INJECTION INTRAVENOUS ONCE
Status: COMPLETED | OUTPATIENT
Start: 2019-01-01 | End: 2019-01-01

## 2019-01-01 RX ORDER — ERGOCALCIFEROL 1.25 MG/1
50000 CAPSULE ORAL WEEKLY
Status: DISCONTINUED | OUTPATIENT
Start: 2019-01-01 | End: 2019-01-01 | Stop reason: HOSPADM

## 2019-01-01 RX ADMIN — Medication 10 ML: at 08:09

## 2019-01-01 RX ADMIN — ASPIRIN 81 MG 81 MG: 81 TABLET ORAL at 08:09

## 2019-01-01 RX ADMIN — Medication 100 MG: at 08:09

## 2019-01-01 RX ADMIN — TICAGRELOR 90 MG: 90 TABLET ORAL at 08:08

## 2019-01-01 RX ADMIN — ERGOCALCIFEROL 50000 UNITS: 1.25 CAPSULE ORAL at 13:28

## 2019-01-01 RX ADMIN — ACETAMINOPHEN 650 MG: 325 TABLET ORAL at 08:09

## 2019-01-01 RX ADMIN — MAGNESIUM SULFATE HEPTAHYDRATE 1 G: 1 INJECTION, SOLUTION INTRAVENOUS at 10:54

## 2019-01-01 RX ADMIN — CARVEDILOL 3.12 MG: 3.12 TABLET, FILM COATED ORAL at 08:09

## 2019-01-01 ASSESSMENT — PAIN SCALES - GENERAL: PAINLEVEL_OUTOF10: 4

## 2019-01-02 ENCOUNTER — TELEPHONE (OUTPATIENT)
Dept: CARDIOLOGY | Age: 44
End: 2019-01-02

## 2019-01-02 ENCOUNTER — TELEPHONE (OUTPATIENT)
Dept: CARDIOLOGY CLINIC | Age: 44
End: 2019-01-02

## 2019-01-02 DIAGNOSIS — Z78.9 ALCOHOL USE: Chronic | ICD-10-CM

## 2019-01-02 DIAGNOSIS — I21.4 NSTEMI (NON-ST ELEVATED MYOCARDIAL INFARCTION) (HCC): Primary | ICD-10-CM

## 2019-01-02 DIAGNOSIS — I20.8 OTHER FORMS OF ANGINA PECTORIS (HCC): ICD-10-CM

## 2019-01-02 LAB — POC ACT LR: 184 SEC

## 2019-01-08 ENCOUNTER — TELEPHONE (OUTPATIENT)
Dept: CARDIOLOGY CLINIC | Age: 44
End: 2019-01-08

## 2019-01-08 ENCOUNTER — HOSPITAL ENCOUNTER (OUTPATIENT)
Dept: NON INVASIVE DIAGNOSTICS | Age: 44
Discharge: HOME | End: 2019-01-08
Payer: COMMERCIAL

## 2019-01-08 DIAGNOSIS — Z78.9 ALCOHOL USE: Chronic | ICD-10-CM

## 2019-01-08 DIAGNOSIS — I20.8 OTHER FORMS OF ANGINA PECTORIS (HCC): ICD-10-CM

## 2019-01-08 DIAGNOSIS — I21.4 NSTEMI (NON-ST ELEVATED MYOCARDIAL INFARCTION) (HCC): ICD-10-CM

## 2019-01-08 LAB
LV EF: 58 %
LVEF MODALITY: NORMAL

## 2019-01-08 PROCEDURE — 93306 TTE W/DOPPLER COMPLETE: CPT

## 2019-01-09 ENCOUNTER — OFFICE VISIT (OUTPATIENT)
Dept: FAMILY MEDICINE CLINIC | Age: 44
End: 2019-01-09
Payer: COMMERCIAL

## 2019-01-09 VITALS
WEIGHT: 163 LBS | DIASTOLIC BLOOD PRESSURE: 80 MMHG | OXYGEN SATURATION: 99 % | SYSTOLIC BLOOD PRESSURE: 112 MMHG | HEART RATE: 64 BPM | BODY MASS INDEX: 23.39 KG/M2 | TEMPERATURE: 98.1 F

## 2019-01-09 DIAGNOSIS — F32.A ANXIETY AND DEPRESSION: Chronic | ICD-10-CM

## 2019-01-09 DIAGNOSIS — G60.9 IDIOPATHIC PERIPHERAL NEUROPATHY: ICD-10-CM

## 2019-01-09 DIAGNOSIS — F41.9 ANXIETY AND DEPRESSION: Chronic | ICD-10-CM

## 2019-01-09 DIAGNOSIS — I21.4 NSTEMI (NON-ST ELEVATED MYOCARDIAL INFARCTION) (HCC): Primary | ICD-10-CM

## 2019-01-09 PROCEDURE — G8427 DOCREV CUR MEDS BY ELIG CLIN: HCPCS | Performed by: NURSE PRACTITIONER

## 2019-01-09 PROCEDURE — 1111F DSCHRG MED/CURRENT MED MERGE: CPT | Performed by: NURSE PRACTITIONER

## 2019-01-09 PROCEDURE — G8598 ASA/ANTIPLAT THER USED: HCPCS | Performed by: NURSE PRACTITIONER

## 2019-01-09 PROCEDURE — G8484 FLU IMMUNIZE NO ADMIN: HCPCS | Performed by: NURSE PRACTITIONER

## 2019-01-09 PROCEDURE — 99204 OFFICE O/P NEW MOD 45 MIN: CPT | Performed by: NURSE PRACTITIONER

## 2019-01-09 PROCEDURE — G8420 CALC BMI NORM PARAMETERS: HCPCS | Performed by: NURSE PRACTITIONER

## 2019-01-09 PROCEDURE — G0444 DEPRESSION SCREEN ANNUAL: HCPCS | Performed by: NURSE PRACTITIONER

## 2019-01-09 PROCEDURE — 4004F PT TOBACCO SCREEN RCVD TLK: CPT | Performed by: NURSE PRACTITIONER

## 2019-01-09 RX ORDER — BUPROPION HYDROCHLORIDE 150 MG/1
150 TABLET ORAL EVERY MORNING
Qty: 30 TABLET | Refills: 3 | Status: SHIPPED | OUTPATIENT
Start: 2019-01-09 | End: 2019-05-02 | Stop reason: SDUPTHER

## 2019-01-09 RX ORDER — ACETAMINOPHEN 500 MG
500 TABLET ORAL EVERY 6 HOURS PRN
COMMUNITY
End: 2020-11-22 | Stop reason: ALTCHOICE

## 2019-01-09 ASSESSMENT — PATIENT HEALTH QUESTIONNAIRE - PHQ9
SUM OF ALL RESPONSES TO PHQ QUESTIONS 1-9: 19
5. POOR APPETITE OR OVEREATING: 2
9. THOUGHTS THAT YOU WOULD BE BETTER OFF DEAD, OR OF HURTING YOURSELF: 0
SUM OF ALL RESPONSES TO PHQ9 QUESTIONS 1 & 2: 5
10. IF YOU CHECKED OFF ANY PROBLEMS, HOW DIFFICULT HAVE THESE PROBLEMS MADE IT FOR YOU TO DO YOUR WORK, TAKE CARE OF THINGS AT HOME, OR GET ALONG WITH OTHER PEOPLE: 2
6. FEELING BAD ABOUT YOURSELF - OR THAT YOU ARE A FAILURE OR HAVE LET YOURSELF OR YOUR FAMILY DOWN: 2
2. FEELING DOWN, DEPRESSED OR HOPELESS: 2
8. MOVING OR SPEAKING SO SLOWLY THAT OTHER PEOPLE COULD HAVE NOTICED. OR THE OPPOSITE, BEING SO FIGETY OR RESTLESS THAT YOU HAVE BEEN MOVING AROUND A LOT MORE THAN USUAL: 3
4. FEELING TIRED OR HAVING LITTLE ENERGY: 1
SUM OF ALL RESPONSES TO PHQ QUESTIONS 1-9: 19
3. TROUBLE FALLING OR STAYING ASLEEP: 3
1. LITTLE INTEREST OR PLEASURE IN DOING THINGS: 3
7. TROUBLE CONCENTRATING ON THINGS, SUCH AS READING THE NEWSPAPER OR WATCHING TELEVISION: 3

## 2019-01-09 ASSESSMENT — ENCOUNTER SYMPTOMS
WHEEZING: 0
SHORTNESS OF BREATH: 0

## 2019-01-23 ENCOUNTER — OFFICE VISIT (OUTPATIENT)
Dept: CARDIOLOGY CLINIC | Age: 44
End: 2019-01-23
Payer: COMMERCIAL

## 2019-01-23 VITALS
WEIGHT: 167 LBS | DIASTOLIC BLOOD PRESSURE: 60 MMHG | BODY MASS INDEX: 23.91 KG/M2 | OXYGEN SATURATION: 99 % | SYSTOLIC BLOOD PRESSURE: 100 MMHG | HEIGHT: 70 IN | HEART RATE: 65 BPM

## 2019-01-23 DIAGNOSIS — I25.10 CORONARY ARTERY DISEASE INVOLVING NATIVE CORONARY ARTERY OF NATIVE HEART WITHOUT ANGINA PECTORIS: Primary | Chronic | ICD-10-CM

## 2019-01-23 DIAGNOSIS — I25.5 ISCHEMIC CARDIOMYOPATHY: ICD-10-CM

## 2019-01-23 DIAGNOSIS — F14.11 COCAINE ABUSE IN REMISSION (HCC): ICD-10-CM

## 2019-01-23 DIAGNOSIS — I21.4 NSTEMI (NON-ST ELEVATED MYOCARDIAL INFARCTION) (HCC): ICD-10-CM

## 2019-01-23 DIAGNOSIS — Z72.0 TOBACCO ABUSE: ICD-10-CM

## 2019-01-23 PROCEDURE — 1111F DSCHRG MED/CURRENT MED MERGE: CPT | Performed by: INTERNAL MEDICINE

## 2019-01-23 PROCEDURE — G8420 CALC BMI NORM PARAMETERS: HCPCS | Performed by: INTERNAL MEDICINE

## 2019-01-23 PROCEDURE — G8484 FLU IMMUNIZE NO ADMIN: HCPCS | Performed by: INTERNAL MEDICINE

## 2019-01-23 PROCEDURE — 4004F PT TOBACCO SCREEN RCVD TLK: CPT | Performed by: INTERNAL MEDICINE

## 2019-01-23 PROCEDURE — G8598 ASA/ANTIPLAT THER USED: HCPCS | Performed by: INTERNAL MEDICINE

## 2019-01-23 PROCEDURE — 99214 OFFICE O/P EST MOD 30 MIN: CPT | Performed by: INTERNAL MEDICINE

## 2019-01-23 PROCEDURE — G8427 DOCREV CUR MEDS BY ELIG CLIN: HCPCS | Performed by: INTERNAL MEDICINE

## 2019-01-23 RX ORDER — ERGOCALCIFEROL (VITAMIN D2) 1250 MCG
50000 CAPSULE ORAL WEEKLY
Qty: 4 CAPSULE | Refills: 6 | Status: SHIPPED | OUTPATIENT
Start: 2019-01-23 | End: 2019-03-04 | Stop reason: SDUPTHER

## 2019-01-23 RX ORDER — CARVEDILOL 3.12 MG/1
3.12 TABLET ORAL 2 TIMES DAILY WITH MEALS
Qty: 60 TABLET | Refills: 11 | Status: SHIPPED | OUTPATIENT
Start: 2019-01-23 | End: 2020-02-18

## 2019-01-30 ENCOUNTER — OFFICE VISIT (OUTPATIENT)
Dept: FAMILY MEDICINE CLINIC | Age: 44
End: 2019-01-30
Payer: COMMERCIAL

## 2019-01-30 VITALS
WEIGHT: 165 LBS | HEART RATE: 68 BPM | TEMPERATURE: 97.8 F | DIASTOLIC BLOOD PRESSURE: 84 MMHG | BODY MASS INDEX: 23.68 KG/M2 | SYSTOLIC BLOOD PRESSURE: 134 MMHG

## 2019-01-30 DIAGNOSIS — F41.9 ANXIETY: Primary | ICD-10-CM

## 2019-01-30 PROCEDURE — 99213 OFFICE O/P EST LOW 20 MIN: CPT | Performed by: NURSE PRACTITIONER

## 2019-01-30 PROCEDURE — G8484 FLU IMMUNIZE NO ADMIN: HCPCS | Performed by: NURSE PRACTITIONER

## 2019-01-30 PROCEDURE — 4004F PT TOBACCO SCREEN RCVD TLK: CPT | Performed by: NURSE PRACTITIONER

## 2019-01-30 PROCEDURE — G8420 CALC BMI NORM PARAMETERS: HCPCS | Performed by: NURSE PRACTITIONER

## 2019-01-30 PROCEDURE — G8598 ASA/ANTIPLAT THER USED: HCPCS | Performed by: NURSE PRACTITIONER

## 2019-01-30 PROCEDURE — G8427 DOCREV CUR MEDS BY ELIG CLIN: HCPCS | Performed by: NURSE PRACTITIONER

## 2019-01-30 RX ORDER — HYDROXYZINE HYDROCHLORIDE 25 MG/1
25-50 TABLET, FILM COATED ORAL NIGHTLY PRN
Qty: 30 TABLET | Refills: 0 | Status: SHIPPED | OUTPATIENT
Start: 2019-01-30 | End: 2019-02-28 | Stop reason: SDUPTHER

## 2019-01-30 ASSESSMENT — PATIENT HEALTH QUESTIONNAIRE - PHQ9
1. LITTLE INTEREST OR PLEASURE IN DOING THINGS: 0
SUM OF ALL RESPONSES TO PHQ9 QUESTIONS 1 & 2: 0
SUM OF ALL RESPONSES TO PHQ QUESTIONS 1-9: 0
SUM OF ALL RESPONSES TO PHQ QUESTIONS 1-9: 0
2. FEELING DOWN, DEPRESSED OR HOPELESS: 0

## 2019-03-05 RX ORDER — ERGOCALCIFEROL 1.25 MG/1
CAPSULE ORAL
Qty: 4 CAPSULE | Refills: 0 | Status: SHIPPED | OUTPATIENT
Start: 2019-03-05 | End: 2019-04-18 | Stop reason: SDUPTHER

## 2019-04-03 ENCOUNTER — APPOINTMENT (OUTPATIENT)
Dept: GENERAL RADIOLOGY | Age: 44
End: 2019-04-03
Payer: COMMERCIAL

## 2019-04-03 ENCOUNTER — HOSPITAL ENCOUNTER (EMERGENCY)
Age: 44
Discharge: HOME | End: 2019-04-03
Attending: EMERGENCY MEDICINE
Payer: COMMERCIAL

## 2019-04-03 VITALS
RESPIRATION RATE: 19 BRPM | HEIGHT: 71 IN | WEIGHT: 170 LBS | HEART RATE: 68 BPM | TEMPERATURE: 98.2 F | OXYGEN SATURATION: 92 % | BODY MASS INDEX: 23.8 KG/M2 | SYSTOLIC BLOOD PRESSURE: 135 MMHG | DIASTOLIC BLOOD PRESSURE: 95 MMHG

## 2019-04-03 DIAGNOSIS — R07.9 CHEST PAIN, UNSPECIFIED TYPE: Primary | ICD-10-CM

## 2019-04-03 LAB
A/G RATIO: 1.4 (ref 1.1–2.2)
ALBUMIN SERPL-MCNC: 4.6 G/DL (ref 3.4–5)
ALP BLD-CCNC: 67 U/L (ref 40–129)
ALT SERPL-CCNC: 28 U/L (ref 10–40)
ANION GAP SERPL CALCULATED.3IONS-SCNC: 13 MMOL/L (ref 3–16)
AST SERPL-CCNC: 36 U/L (ref 15–37)
BASOPHILS ABSOLUTE: 0.1 K/UL (ref 0–0.2)
BASOPHILS RELATIVE PERCENT: 0.8 %
BILIRUB SERPL-MCNC: <0.2 MG/DL (ref 0–1)
BUN BLDV-MCNC: 14 MG/DL (ref 7–20)
CALCIUM SERPL-MCNC: 9.9 MG/DL (ref 8.3–10.6)
CHLORIDE BLD-SCNC: 104 MMOL/L (ref 99–110)
CO2: 26 MMOL/L (ref 21–32)
CREAT SERPL-MCNC: 1 MG/DL (ref 0.9–1.3)
EOSINOPHILS ABSOLUTE: 0.5 K/UL (ref 0–0.6)
EOSINOPHILS RELATIVE PERCENT: 4.6 %
GFR AFRICAN AMERICAN: >60
GFR NON-AFRICAN AMERICAN: >60
GLOBULIN: 3.2 G/DL
GLUCOSE BLD-MCNC: 92 MG/DL (ref 70–99)
HCT VFR BLD CALC: 41.6 % (ref 40.5–52.5)
HEMOGLOBIN: 13.6 G/DL (ref 13.5–17.5)
LYMPHOCYTES ABSOLUTE: 2.6 K/UL (ref 1–5.1)
LYMPHOCYTES RELATIVE PERCENT: 23.7 %
MCH RBC QN AUTO: 27.7 PG (ref 26–34)
MCHC RBC AUTO-ENTMCNC: 32.6 G/DL (ref 31–36)
MCV RBC AUTO: 84.9 FL (ref 80–100)
MONOCYTES ABSOLUTE: 1.4 K/UL (ref 0–1.3)
MONOCYTES RELATIVE PERCENT: 12.5 %
NEUTROPHILS ABSOLUTE: 6.3 K/UL (ref 1.7–7.7)
NEUTROPHILS RELATIVE PERCENT: 58.4 %
PDW BLD-RTO: 13.3 % (ref 12.4–15.4)
PLATELET # BLD: 272 K/UL (ref 135–450)
PMV BLD AUTO: 7.3 FL (ref 5–10.5)
POTASSIUM REFLEX MAGNESIUM: 4 MMOL/L (ref 3.5–5.1)
RBC # BLD: 4.9 M/UL (ref 4.2–5.9)
SODIUM BLD-SCNC: 143 MMOL/L (ref 136–145)
TOTAL PROTEIN: 7.8 G/DL (ref 6.4–8.2)
TROPONIN: <0.01 NG/ML
WBC # BLD: 10.9 K/UL (ref 4–11)

## 2019-04-03 PROCEDURE — 36415 COLL VENOUS BLD VENIPUNCTURE: CPT

## 2019-04-03 PROCEDURE — 80053 COMPREHEN METABOLIC PANEL: CPT

## 2019-04-03 PROCEDURE — 85025 COMPLETE CBC W/AUTO DIFF WBC: CPT

## 2019-04-03 PROCEDURE — 6370000000 HC RX 637 (ALT 250 FOR IP): Performed by: EMERGENCY MEDICINE

## 2019-04-03 PROCEDURE — 71046 X-RAY EXAM CHEST 2 VIEWS: CPT

## 2019-04-03 PROCEDURE — 84484 ASSAY OF TROPONIN QUANT: CPT

## 2019-04-03 PROCEDURE — 99285 EMERGENCY DEPT VISIT HI MDM: CPT

## 2019-04-03 PROCEDURE — 93005 ELECTROCARDIOGRAM TRACING: CPT | Performed by: PHYSICIAN ASSISTANT

## 2019-04-03 RX ORDER — GABAPENTIN 300 MG/1
300 CAPSULE ORAL 3 TIMES DAILY
COMMUNITY
End: 2019-09-17

## 2019-04-03 RX ORDER — HYDROCODONE BITARTRATE AND ACETAMINOPHEN 5; 325 MG/1; MG/1
2 TABLET ORAL ONCE
Status: COMPLETED | OUTPATIENT
Start: 2019-04-03 | End: 2019-04-03

## 2019-04-03 RX ORDER — DULOXETIN HYDROCHLORIDE 60 MG/1
60 CAPSULE, DELAYED RELEASE ORAL DAILY
COMMUNITY
End: 2019-05-31 | Stop reason: ALTCHOICE

## 2019-04-03 RX ORDER — HYDROCODONE BITARTRATE AND ACETAMINOPHEN 5; 325 MG/1; MG/1
1 TABLET ORAL EVERY 6 HOURS PRN
Qty: 20 TABLET | Refills: 0 | Status: SHIPPED | OUTPATIENT
Start: 2019-04-03 | End: 2019-04-08

## 2019-04-03 RX ORDER — ONDANSETRON 4 MG/1
4 TABLET, ORALLY DISINTEGRATING ORAL ONCE
Status: COMPLETED | OUTPATIENT
Start: 2019-04-03 | End: 2019-04-03

## 2019-04-03 RX ADMIN — HYDROCODONE BITARTRATE AND ACETAMINOPHEN 2 TABLET: 5; 325 TABLET ORAL at 19:47

## 2019-04-03 RX ADMIN — ONDANSETRON 4 MG: 4 TABLET, ORALLY DISINTEGRATING ORAL at 19:47

## 2019-04-03 ASSESSMENT — PAIN DESCRIPTION - LOCATION: LOCATION: CHEST

## 2019-04-03 ASSESSMENT — PAIN DESCRIPTION - DESCRIPTORS: DESCRIPTORS: DULL

## 2019-04-03 ASSESSMENT — HEART SCORE: ECG: 0

## 2019-04-03 ASSESSMENT — PAIN DESCRIPTION - ORIENTATION: ORIENTATION: RIGHT

## 2019-04-03 ASSESSMENT — PAIN DESCRIPTION - FREQUENCY: FREQUENCY: CONTINUOUS

## 2019-04-03 ASSESSMENT — PAIN SCALES - GENERAL: PAINLEVEL_OUTOF10: 3

## 2019-04-03 NOTE — ED NOTES
Pt denies SOB, chest pressure, edema or further c/o's.  Cardiac monitor applied, family @ bedside     [address] Neville, RAISA  04/03/19 9827

## 2019-04-03 NOTE — ED PROVIDER NOTES
Triage Chief Complaint:   Chest Pain (C/o R sided CP that radiates into his neck)      [place]:  Marni Cherry is a 37 y.o. male that presents with chest pain. Patient was moving doors this past weekend and developed pain in his right pectoral and right trapezius muscle. He was not short of breath though it didn't hurt to take a breath when using those muscles it also hurts to touch and to move. He did not have a fever he was not frankly short of breath there was no diaphoresis and no radiation of pain. Patient did have a non-ST elevation MI and has stents in place. He denies a cough or fever. Patient never had a DVT or PE and does not have unilateral leg swelling prolonged immobility, hemoptysis malignancy or recent surgery. Pain began the day after lifting his Katie.     ROS:  Review of systems was reviewed for 10 systems and is otherwise negative except as in the [address]    Past Medical History:   Diagnosis Date    Anxiety     Depression     NSTEMI (non-ST elevated myocardial infarction) ([facility] [address] 12/30/2018    Reflux     Traumatic rupture of spleen     Wears glasses      Past Surgical History:   Procedure Laterality Date    HERNIA REPAIR      KNEE SURGERY Left     ACL    PTCA  12/30/2018    ROTATOR CUFF REPAIR Right      Family History   Problem Relation Age of Onset    Diabetes Mother     Heart Disease Mother     Cancer Sister     Heart Disease Father     Diabetes Brother     Prostate Cancer Maternal Uncle     Stomach Cancer Maternal Grandmother     Cancer Maternal Grandfather      Social History     Socioeconomic History    Marital status: Single     Spouse name: Not on file    Number of children: Not on file    Years of education: Not on file    Highest education level: Not on file   Occupational History    Not on file   Social Needs    Financial resource strain: Not on file    Food insecurity:     Worry: Not on file     Inability: Not on file    Transportation needs:     Medical: (ERGOCALCIFEROL) 61376 units CAPS capsule TAKE 1 CAPSULE ONCE A WEEK 4 capsule 0    hydrOXYzine (ATARAX) 25 MG tablet TAKE 1 OR 2 TABLETS NIGHTLY AS NEEDED FOR ANXEITY 30 tablet 5    acetaminophen (TYLENOL) 500 MG tablet Take 500 mg by mouth every 6 hours as needed for Pain      aspirin 81 MG chewable tablet Take 1 tablet by mouth daily 30 tablet 0    carvedilol (COREG) 3.125 MG tablet Take 1 tablet by mouth 2 times daily (with meals) 60 tablet 11    ticagrelor (BRILINTA) 90 MG TABS tablet Take 1 tablet by mouth 2 times daily 60 tablet 11    buPROPion (WELLBUTRIN XL) 150 MG extended release tablet Take 1 tablet by mouth every morning 30 tablet 3     Allergies   Allergen Reactions    Codeine Nausea Only    Codeine Nausea Only     Nursing Notes Reviewed    Physical Exam:  ED Triage Vitals [04/03/19 0245]   Enc Vitals Group      BP (!) 143/93      Pulse 76      Resp 18      Temp 98.2 °F (36.8 °C)      Temp Source Oral      SpO2 96 %      Weight 170 lb (77.1 kg)      Height 5' 11\" (1.803 m)      Head Circumference       Peak Flow       Pain Score       Pain Loc       Pain Edu? Excl. in [address]? GENERAL APPEARANCE: A well-developed well-nourished  very pleasant 45-year-old male in mild to moderate distress  HEAD: Normocephalic, atraumatic  EYES: Sclera anicteric.no conjunctival injection,   ENT: Mucous membranes moist, no nasal discharge,   NECK: Supple, no meningismus, no JVD, pain in the right trapezius muscle  HEART: RRR without rubs murmurs or gallops, pulses were 2+ and equal carotid radial femoral and dorsalis pedis  CHEST: Right pectoral and upper lateral chest wall pain to palpation  LUNGS:  Clear good air movement no wheezing no retraction or accessory muscle use, not tachypneic tachycardic or hypoxic  ABDOMEN: Soft, non-tender to palpation, no guarding or rebound. , no mass or distention and no hepatosplenomegaly.   No peritoneal signs, focal findings, or evidence of an acute abdomen at time of exam   EXTREMITIES: No acute deformities, no peripheral edema, no cord no erythema no unilateral leg swelling  SKIN: Warm and dry.  Normal color, no rash,  capillary refill less than 2 seconds  MENTAL STATUS: Alert, oriented, interactive,   NEUROLOGICAL:  No facial drooping. moves all 4 extremities, sensation intact, no focal findings     Nursing note and vital signs reviewed     I have reviewed and interpreted all of the currently available lab results from this visit (if applicable):  Results for orders placed or performed during the hospital encounter of 04/03/19   CBC Auto Differential   Result Value Ref Range    WBC 10.9 4.0 - 11.0 K/uL    RBC 4.90 4.20 - 5.90 M/uL    Hemoglobin 13.6 13.5 - 17.5 g/dL    Hematocrit 41.6 40.5 - 52.5 %    MCV 84.9 80.0 - 100.0 fL    MCH 27.7 26.0 - 34.0 pg    MCHC 32.6 31.0 - 36.0 g/dL    RDW 13.3 12.4 - 15.4 %    Platelets 317 337 - 943 K/uL    MPV 7.3 5.0 - 10.5 fL    Neutrophils % 58.4 %    Lymphocytes % 23.7 %    Monocytes % 12.5 %    Eosinophils % 4.6 %    Basophils % 0.8 %    Neutrophils # 6.3 1.7 - 7.7 K/uL    Lymphocytes # 2.6 1.0 - 5.1 K/uL    Monocytes # 1.4 (H) 0.0 - 1.3 K/uL    Eosinophils # 0.5 0.0 - 0.6 K/uL    Basophils # 0.1 0.0 - 0.2 K/uL   Comprehensive Metabolic Panel w/ Reflex to MG   Result Value Ref Range    Sodium 143 136 - 145 mmol/L    Potassium reflex Magnesium 4.0 3.5 - 5.1 mmol/L    Chloride 104 99 - 110 mmol/L    CO2 26 21 - 32 mmol/L    Anion Gap 13 3 - 16    Glucose 92 70 - 99 mg/dL    BUN 14 7 - 20 mg/dL    CREATININE 1.0 0.9 - 1.3 mg/dL    GFR Non-African American >60 >60    GFR African American >60 >60    Calcium 9.9 8.3 - 10.6 mg/dL    Total Protein 7.8 6.4 - 8.2 g/dL    Alb 4.6 3.4 - 5.0 g/dL    Albumin/Globulin Ratio 1.4 1.1 - 2.2    Total Bilirubin <0.2 0.0 - 1.0 mg/dL    Alkaline Phosphatase 67 40 - 129 U/L    ALT 28 10 - 40 U/L    AST 36 15 - 37 U/L    Globulin 3.2 g/dL   Troponin   Result Value Ref Range    Troponin <0.01 <0.01 ng/mL   EKG 12 Lead   Result Value Ref Range    Ventricular Rate 77 BPM    Atrial Rate 77 BPM    P-R Interval 182 ms    QRS Duration 96 ms    Q-T Interval 386 ms    QTc Calculation (Bazett) 436 ms    P Axis 49 degrees    R Axis 17 degrees    T Axis 54 degrees    Diagnosis       Normal sinus rhythmNormal ECGWhen compared with ECG of 30-DEC-2018 20:17,No significant change was found        Radiographs (if obtained):  ? Radiologist's Report Reviewed:  XR CHEST STANDARD (2 VW)   Final Result   No significant findings in the chest.             ? Discussed with Radiologist:     ? The following radiograph was interpreted by myself in the absence of a radiologist:     EKG (if obtained): (All EKG's are interpreted by myself in the absence of a cardiologist)  EKG demonstrates a normal sinus rhythm with normal axis normal intervals normal ST-T wave segments and no evidence of ischemia infarction or arrhythmia heart rate is 77 and much like his previous EKG    MDM:   Patient with right lateral pectoral and trapezius pain presents for evaluation. His history is not consistent with heart disease though we did a workup to evaluate for heart disease and no evidence was identified. He has had pain essentially for 2 days and his labs were normal his EKG is unchanged chest x-ray failed to demonstrate any acute process. He'll be treated as an outpatient and followed up with cardiology. I will given hydrocodone for pain. In the interim he is to return to the emergency department if problems develop as evidenced by shortness of breath clemente chest pain or fever vomiting or other concerning symptoms. Patient is wife are very reliable and understand these instructions. Final Impression:  1.  Chest pain, unspecified type        Critical Care:       Disposition referral (if applicable):  Cardiology    In 1 day        Disposition medications (if applicable):  New Prescriptions    HYDROCODONE-ACETAMINOPHEN (LORCET) 5-325 MG PER TABLET    Take 1 tablet by mouth every 6 hours as needed for Pain for up to 5 days. . Take lowest dose possible to manage pain       Comment: Please note this report has been produced using speech recognition software and may contain errors related to that system including errors in grammar, punctuation, and spelling, as well as words and phrases that may be inappropriate. If there are any questions or concerns please feel free to contact the dictating provider for clarification.       (Please note that portions of this note may have been completed with a voice recognition program. Efforts were made to edit the dictations but occasionally words are mis-transcribed.)    MD Dinorah Diaz., MD  04/03/19 1941

## 2019-04-03 NOTE — ED NOTES
To XR via [facility] without incident     [facility]  04/03/19 2991

## 2019-04-04 LAB
EKG ATRIAL RATE: 77 BPM
EKG DIAGNOSIS: NORMAL
EKG P AXIS: 49 DEGREES
EKG P-R INTERVAL: 182 MS
EKG Q-T INTERVAL: 386 MS
EKG QRS DURATION: 96 MS
EKG QTC CALCULATION (BAZETT): 436 MS
EKG R AXIS: 17 DEGREES
EKG T AXIS: 54 DEGREES
EKG VENTRICULAR RATE: 77 BPM

## 2019-04-04 PROCEDURE — 93010 ELECTROCARDIOGRAM REPORT: CPT | Performed by: INTERNAL MEDICINE

## 2019-04-18 RX ORDER — ERGOCALCIFEROL 1.25 MG/1
CAPSULE ORAL
Qty: 4 CAPSULE | Refills: 0 | Status: SHIPPED | OUTPATIENT
Start: 2019-04-18 | End: 2019-05-09 | Stop reason: SDUPTHER

## 2019-05-13 RX ORDER — ERGOCALCIFEROL 1.25 MG/1
CAPSULE ORAL
Qty: 4 CAPSULE | Refills: 0 | Status: SHIPPED | OUTPATIENT
Start: 2019-05-13 | End: 2019-06-03 | Stop reason: SDUPTHER

## 2019-05-31 ENCOUNTER — OFFICE VISIT (OUTPATIENT)
Dept: FAMILY MEDICINE CLINIC | Age: 44
End: 2019-05-31
Payer: COMMERCIAL

## 2019-05-31 VITALS
BODY MASS INDEX: 25.36 KG/M2 | DIASTOLIC BLOOD PRESSURE: 76 MMHG | HEART RATE: 57 BPM | SYSTOLIC BLOOD PRESSURE: 110 MMHG | OXYGEN SATURATION: 99 % | WEIGHT: 181.8 LBS | TEMPERATURE: 98 F

## 2019-05-31 DIAGNOSIS — N52.9 ERECTILE DYSFUNCTION, UNSPECIFIED ERECTILE DYSFUNCTION TYPE: Primary | ICD-10-CM

## 2019-05-31 DIAGNOSIS — R61 NIGHT SWEATS: ICD-10-CM

## 2019-05-31 DIAGNOSIS — F51.01 PRIMARY INSOMNIA: ICD-10-CM

## 2019-05-31 DIAGNOSIS — I25.10 CORONARY ARTERY DISEASE INVOLVING NATIVE CORONARY ARTERY OF NATIVE HEART WITHOUT ANGINA PECTORIS: Chronic | ICD-10-CM

## 2019-05-31 DIAGNOSIS — F51.5 NIGHTMARES: ICD-10-CM

## 2019-05-31 DIAGNOSIS — F39 MOOD DISORDER (HCC): ICD-10-CM

## 2019-05-31 LAB
CHOLESTEROL, FASTING: 262 MG/DL (ref 0–199)
HDLC SERPL-MCNC: 45 MG/DL (ref 40–60)
LDL CHOLESTEROL CALCULATED: 174 MG/DL
T4 FREE: 0.8 NG/DL (ref 0.9–1.8)
TRIGLYCERIDE, FASTING: 214 MG/DL (ref 0–150)
TSH REFLEX FT4: 5.53 UIU/ML (ref 0.27–4.2)
VITAMIN D 25-HYDROXY: 37.2 NG/ML
VLDLC SERPL CALC-MCNC: 43 MG/DL

## 2019-05-31 PROCEDURE — G8427 DOCREV CUR MEDS BY ELIG CLIN: HCPCS | Performed by: NURSE PRACTITIONER

## 2019-05-31 PROCEDURE — G8598 ASA/ANTIPLAT THER USED: HCPCS | Performed by: NURSE PRACTITIONER

## 2019-05-31 PROCEDURE — 99213 OFFICE O/P EST LOW 20 MIN: CPT | Performed by: NURSE PRACTITIONER

## 2019-05-31 PROCEDURE — 4004F PT TOBACCO SCREEN RCVD TLK: CPT | Performed by: NURSE PRACTITIONER

## 2019-05-31 PROCEDURE — G8419 CALC BMI OUT NRM PARAM NOF/U: HCPCS | Performed by: NURSE PRACTITIONER

## 2019-05-31 PROCEDURE — 36415 COLL VENOUS BLD VENIPUNCTURE: CPT | Performed by: NURSE PRACTITIONER

## 2019-05-31 RX ORDER — ARIPIPRAZOLE 5 MG/1
5 TABLET ORAL DAILY
Qty: 30 TABLET | Refills: 3 | Status: SHIPPED | OUTPATIENT
Start: 2019-05-31 | End: 2019-10-26 | Stop reason: SDUPTHER

## 2019-05-31 ASSESSMENT — ENCOUNTER SYMPTOMS
SHORTNESS OF BREATH: 0
WHEEZING: 0

## 2019-05-31 NOTE — PROGRESS NOTES
Respiratory: Negative for shortness of breath and wheezing. Cardiovascular: Negative for chest pain and palpitations. Psychiatric/Behavioral: Positive for dysphoric mood (mood is up and down) and sleep disturbance. Negative for suicidal ideas. Physical Exam   Constitutional: He is oriented to person, place, and time. Vital signs are normal. He appears well-developed and well-nourished. HENT:   Head: Normocephalic and atraumatic. Right Ear: Tympanic membrane and external ear normal.   Left Ear: Tympanic membrane and external ear normal.   Nose: Nose normal.   Mouth/Throat: Oropharynx is clear and moist. No oropharyngeal exudate, posterior oropharyngeal edema or posterior oropharyngeal erythema. Eyes: Conjunctivae are normal.   Neck: Normal range of motion. Cardiovascular: Normal rate, regular rhythm and normal heart sounds. No murmur heard. Pulmonary/Chest: Effort normal and breath sounds normal. No respiratory distress. He has no wheezes. He has no rales. Abdominal: Soft. Bowel sounds are normal. He exhibits no distension. There is no tenderness. There is no rebound. Musculoskeletal: Normal range of motion. Lymphadenopathy:     He has no cervical adenopathy. Neurological: He is alert and oriented to person, place, and time. He has normal reflexes. Skin: Skin is warm and dry. Psychiatric: He has a normal mood and affect. His behavior is normal. Judgment and thought content normal.   Nursing note and vitals reviewed. Vitals:    05/31/19 1000   BP: 110/76   Pulse: 57   Temp: 98 °F (36.7 °C)   SpO2: 99%       Assessment:  Encounter Diagnoses   Name Primary?  Erectile dysfunction, unspecified erectile dysfunction type Yes    Primary insomnia     Nightmares     Mood disorder (HCC)     Night sweats     Coronary artery disease involving native coronary artery of native heart without angina pectoris        Plan:  1.  Erectile dysfunction, unspecified erectile dysfunction type    - Delon Lorenz MD, [facility]    2. Primary insomnia    - Raquel Sprague MD, Sheryl Hernandez    3. Nightmares    - [facility]quel Sprague [facility], [facility]    4. Mood disorder (HCC)    - ARIPiprazole (ABILIFY) 5 MG tablet; Take 1 tablet by mouth daily  Dispense: 30 tablet; Refill: 3    5. Night sweats    - TSH WITH REFLEX TO FT4    6.  Coronary artery disease involving native coronary artery of native heart without angina pectoris    - VITAMIN D 25 HYDROXY  - Lipid, Fasting

## 2019-06-03 ENCOUNTER — TELEPHONE (OUTPATIENT)
Dept: CARDIOLOGY CLINIC | Age: 44
End: 2019-06-03

## 2019-06-03 DIAGNOSIS — R79.89 ELEVATED TSH: Primary | ICD-10-CM

## 2019-06-03 NOTE — TELEPHONE ENCOUNTER
----- Message from Jennifer Penn MD sent at 6/1/2019  5:00 PM EDT -----  Let pt know that Vit D is repleted. Ok to move Vit D to 1,000Units qday. D/c other Vit D. Also will trial starting Lipitor 10mg poqHs. Call me if myalgias return.

## 2019-06-06 RX ORDER — ERGOCALCIFEROL 1.25 MG/1
CAPSULE ORAL
Qty: 4 CAPSULE | Refills: 0 | Status: SHIPPED | OUTPATIENT
Start: 2019-06-06 | End: 2019-06-10 | Stop reason: DRUGHIGH

## 2019-06-06 NOTE — TELEPHONE ENCOUNTER
1/23/19 JJP  PLAN  1. Pt doing very well, no issues   2. HOLDING statin due to intolerance              - repleting Vit D then will restart Lipitor 10mg qhs once Vit D >30  3. ASA 81mg poqday for life, ticagrelor 90mg po BID for 1 yr   4. Ok to continue coreg with hx of cocaine use  5. Avoid ETOH, tobacco and cocaine-> d/w avoidance of all in hosp  6.  Pt may resume full activity, no restrictions.

## 2019-06-10 RX ORDER — ERGOCALCIFEROL 1.25 MG/1
1000 CAPSULE ORAL WEEKLY
Qty: 4 CAPSULE | Refills: 5 | Status: CANCELLED | OUTPATIENT
Start: 2019-06-10

## 2019-06-11 PROBLEM — E78.2 MIXED HYPERLIPIDEMIA: Status: ACTIVE | Noted: 2019-06-11

## 2019-06-12 RX ORDER — ATORVASTATIN CALCIUM 10 MG/1
10 TABLET, FILM COATED ORAL DAILY
Qty: 90 TABLET | Refills: 1 | Status: SHIPPED | OUTPATIENT
Start: 2019-06-12 | End: 2019-09-17

## 2019-08-06 ENCOUNTER — HOSPITAL ENCOUNTER (EMERGENCY)
Age: 44
Discharge: HOME | End: 2019-08-06
Payer: COMMERCIAL

## 2019-08-06 VITALS
WEIGHT: 172 LBS | DIASTOLIC BLOOD PRESSURE: 92 MMHG | SYSTOLIC BLOOD PRESSURE: 132 MMHG | HEIGHT: 71 IN | TEMPERATURE: 98.2 F | HEART RATE: 66 BPM | RESPIRATION RATE: 15 BRPM | OXYGEN SATURATION: 100 % | BODY MASS INDEX: 24.08 KG/M2

## 2019-08-06 DIAGNOSIS — L02.91 ABSCESS: Primary | ICD-10-CM

## 2019-08-06 PROCEDURE — 4500000023 HC ED LEVEL 3 PROCEDURE

## 2019-08-06 PROCEDURE — 99283 EMERGENCY DEPT VISIT LOW MDM: CPT

## 2019-08-06 RX ORDER — SULFAMETHOXAZOLE AND TRIMETHOPRIM 800; 160 MG/1; MG/1
1 TABLET ORAL 2 TIMES DAILY
Qty: 14 TABLET | Refills: 0 | Status: SHIPPED | OUTPATIENT
Start: 2019-08-06 | End: 2019-08-13

## 2019-08-06 ASSESSMENT — PAIN DESCRIPTION - ORIENTATION: ORIENTATION: RIGHT

## 2019-08-06 ASSESSMENT — ENCOUNTER SYMPTOMS
VOMITING: 0
ROS SKIN COMMENTS: ABSCESS

## 2019-08-06 ASSESSMENT — PAIN DESCRIPTION - LOCATION: LOCATION: ARM

## 2019-08-06 ASSESSMENT — PAIN SCALES - GENERAL: PAINLEVEL_OUTOF10: 5

## 2019-08-07 ENCOUNTER — TELEPHONE (OUTPATIENT)
Dept: PULMONOLOGY | Age: 44
End: 2019-08-07

## 2019-08-07 NOTE — TELEPHONE ENCOUNTER
Patient did not show for new sleep appointment  with Dr. Raoul Buck on 8/7/19    Same Day Cancellation: No    Patient rescheduled:  No    Patient was also no show on: N/A    LOV N/A

## 2019-09-17 ENCOUNTER — APPOINTMENT (OUTPATIENT)
Dept: GENERAL RADIOLOGY | Age: 44
End: 2019-09-17
Payer: COMMERCIAL

## 2019-09-17 ENCOUNTER — HOSPITAL ENCOUNTER (EMERGENCY)
Age: 44
Discharge: LEFT BEFORE DISCHARGE | End: 2019-09-17
Attending: EMERGENCY MEDICINE
Payer: COMMERCIAL

## 2019-09-17 ENCOUNTER — TELEPHONE (OUTPATIENT)
Dept: CARDIOLOGY CLINIC | Age: 44
End: 2019-09-17

## 2019-09-17 VITALS
DIASTOLIC BLOOD PRESSURE: 93 MMHG | HEIGHT: 70 IN | WEIGHT: 170 LBS | OXYGEN SATURATION: 99 % | TEMPERATURE: 97.4 F | RESPIRATION RATE: 18 BRPM | SYSTOLIC BLOOD PRESSURE: 162 MMHG | BODY MASS INDEX: 24.34 KG/M2 | HEART RATE: 63 BPM

## 2019-09-17 DIAGNOSIS — Z53.21 ELOPED FROM EMERGENCY DEPARTMENT: Primary | ICD-10-CM

## 2019-09-17 LAB
A/G RATIO: 1.3 (ref 1.1–2.2)
ALBUMIN SERPL-MCNC: 4.6 G/DL (ref 3.4–5)
ALP BLD-CCNC: 86 U/L (ref 40–129)
ALT SERPL-CCNC: 11 U/L (ref 10–40)
ANION GAP SERPL CALCULATED.3IONS-SCNC: 11 MMOL/L (ref 3–16)
AST SERPL-CCNC: 18 U/L (ref 15–37)
BASOPHILS ABSOLUTE: 0.1 K/UL (ref 0–0.2)
BASOPHILS RELATIVE PERCENT: 0.7 %
BILIRUB SERPL-MCNC: <0.2 MG/DL (ref 0–1)
BUN BLDV-MCNC: 10 MG/DL (ref 7–20)
CALCIUM SERPL-MCNC: 9.8 MG/DL (ref 8.3–10.6)
CHLORIDE BLD-SCNC: 102 MMOL/L (ref 99–110)
CO2: 26 MMOL/L (ref 21–32)
CREAT SERPL-MCNC: 1 MG/DL (ref 0.9–1.3)
D DIMER: <200 NG/ML DDU (ref 0–229)
EKG ATRIAL RATE: 72 BPM
EKG DIAGNOSIS: NORMAL
EKG P AXIS: 54 DEGREES
EKG P-R INTERVAL: 178 MS
EKG Q-T INTERVAL: 388 MS
EKG QRS DURATION: 92 MS
EKG QTC CALCULATION (BAZETT): 424 MS
EKG R AXIS: 14 DEGREES
EKG T AXIS: 61 DEGREES
EKG VENTRICULAR RATE: 72 BPM
EOSINOPHILS ABSOLUTE: 0.1 K/UL (ref 0–0.6)
EOSINOPHILS RELATIVE PERCENT: 1.6 %
GFR AFRICAN AMERICAN: >60
GFR NON-AFRICAN AMERICAN: >60
GLOBULIN: 3.6 G/DL
GLUCOSE BLD-MCNC: 112 MG/DL (ref 70–99)
HCT VFR BLD CALC: 48.9 % (ref 40.5–52.5)
HEMOGLOBIN: 16.1 G/DL (ref 13.5–17.5)
INR BLD: 1.09 (ref 0.86–1.14)
LYMPHOCYTES ABSOLUTE: 2.2 K/UL (ref 1–5.1)
LYMPHOCYTES RELATIVE PERCENT: 27.5 %
MCH RBC QN AUTO: 28.5 PG (ref 26–34)
MCHC RBC AUTO-ENTMCNC: 32.9 G/DL (ref 31–36)
MCV RBC AUTO: 86.6 FL (ref 80–100)
MONOCYTES ABSOLUTE: 0.6 K/UL (ref 0–1.3)
MONOCYTES RELATIVE PERCENT: 7.9 %
NEUTROPHILS ABSOLUTE: 5 K/UL (ref 1.7–7.7)
NEUTROPHILS RELATIVE PERCENT: 62.3 %
PDW BLD-RTO: 14.5 % (ref 12.4–15.4)
PLATELET # BLD: 289 K/UL (ref 135–450)
PMV BLD AUTO: 8.3 FL (ref 5–10.5)
POTASSIUM SERPL-SCNC: 4.3 MMOL/L (ref 3.5–5.1)
PRO-BNP: 17 PG/ML (ref 0–124)
PROTHROMBIN TIME: 12.4 SEC (ref 9.8–13)
RBC # BLD: 5.65 M/UL (ref 4.2–5.9)
SODIUM BLD-SCNC: 139 MMOL/L (ref 136–145)
TOTAL PROTEIN: 8.2 G/DL (ref 6.4–8.2)
TROPONIN: <0.01 NG/ML
WBC # BLD: 8 K/UL (ref 4–11)

## 2019-09-17 PROCEDURE — 6370000000 HC RX 637 (ALT 250 FOR IP): Performed by: NURSE PRACTITIONER

## 2019-09-17 PROCEDURE — 99285 EMERGENCY DEPT VISIT HI MDM: CPT

## 2019-09-17 PROCEDURE — 85379 FIBRIN DEGRADATION QUANT: CPT

## 2019-09-17 PROCEDURE — 84484 ASSAY OF TROPONIN QUANT: CPT

## 2019-09-17 PROCEDURE — 71046 X-RAY EXAM CHEST 2 VIEWS: CPT

## 2019-09-17 PROCEDURE — 87040 BLOOD CULTURE FOR BACTERIA: CPT

## 2019-09-17 PROCEDURE — 85610 PROTHROMBIN TIME: CPT

## 2019-09-17 PROCEDURE — 83880 ASSAY OF NATRIURETIC PEPTIDE: CPT

## 2019-09-17 PROCEDURE — 85025 COMPLETE CBC W/AUTO DIFF WBC: CPT

## 2019-09-17 PROCEDURE — 93005 ELECTROCARDIOGRAM TRACING: CPT | Performed by: EMERGENCY MEDICINE

## 2019-09-17 PROCEDURE — 93010 ELECTROCARDIOGRAM REPORT: CPT | Performed by: INTERNAL MEDICINE

## 2019-09-17 PROCEDURE — 80053 COMPREHEN METABOLIC PANEL: CPT

## 2019-09-17 RX ORDER — PREDNISONE 20 MG/1
TABLET ORAL
Qty: 18 TABLET | Refills: 0 | Status: SHIPPED | OUTPATIENT
Start: 2019-09-17 | End: 2019-11-30

## 2019-09-17 RX ORDER — ASPIRIN 325 MG
325 TABLET ORAL ONCE
Status: COMPLETED | OUTPATIENT
Start: 2019-09-17 | End: 2019-09-17

## 2019-09-17 RX ORDER — ALBUTEROL SULFATE 90 UG/1
AEROSOL, METERED RESPIRATORY (INHALATION)
Qty: 1 INHALER | Refills: 1 | Status: SHIPPED | OUTPATIENT
Start: 2019-09-17 | End: 2019-11-30

## 2019-09-17 RX ORDER — IPRATROPIUM BROMIDE AND ALBUTEROL SULFATE 2.5; .5 MG/3ML; MG/3ML
1 SOLUTION RESPIRATORY (INHALATION) ONCE
Status: COMPLETED | OUTPATIENT
Start: 2019-09-17 | End: 2019-09-17

## 2019-09-17 RX ADMIN — IPRATROPIUM BROMIDE AND ALBUTEROL SULFATE 1 AMPULE: .5; 3 SOLUTION RESPIRATORY (INHALATION) at 15:28

## 2019-09-17 RX ADMIN — Medication 325 MG: at 16:34

## 2019-09-17 ASSESSMENT — HEART SCORE: ECG: 0

## 2019-09-17 ASSESSMENT — ENCOUNTER SYMPTOMS
SHORTNESS OF BREATH: 1
ABDOMINAL PAIN: 0

## 2019-09-17 NOTE — ED PROVIDER NOTES
[address] ED  eMERGENCY dEPARTMENT eNCOUnter        Pt Name: Shamika Rodriguez  MRN: [id number]  [place] 1975  Date of evaluation: 9/17/2019  Provider: CATERINA Rogers CNP  PCP: CATERINA Vernon CNP  ED Attending: No att. providers found    [facility]       Chief Complaint   Patient presents with    Shortness of Breath     pt c/o increasing sob and fatigue over past week, denies cough/congestion/pain, also states has had some bleeding from right ear       HISTORY OF PRESENT ILLNESS   (Location/Symptom, Timing/Onset, Context/Setting, Quality, Duration, Modifying Factors, Severity)  Note limiting factors. Shamika Rodriguez is a 40 y.o. male for shortness of breath. Onset was 3-4 days. Duration has been since the onset. Context includes pt states he has had sob for the past 3-4 days. He has a history of  CAD and is on blood thinners but is a smoker. Alleviating factors include nothing. Aggravating factors include nothing. Pain is 0/10.  nothing has been used for pain today. Nursing Notes were all reviewed and agreed with or any disagreements were addressed  in the HPI. REVIEW OF SYSTEMS  (2-9 systems for level 4, 10 or more for level 5)     Review of Systems   Constitutional: Positive for fatigue. Negative for fever. Respiratory: Positive for shortness of breath. Cardiovascular: Negative for chest pain. Gastrointestinal: Negative for abdominal pain. Genitourinary: Negative for difficulty urinating. All other systems reviewed and are negative. Positivesand Pertinent negatives as per HPI. Except as noted above in the ROS, all other systems were reviewed and negative.        PAST MEDICAL HISTORY     Past Medical History:   Diagnosis Date    Anxiety     Depression     NSTEMI (non-ST elevated myocardial infarction) ([facility] [address] 12/30/2018    Positive urine drug screen 12/2018    opiate, cocaine, benzos    Reflux     Traumatic rupture of spleen  Wears glasses          SURGICAL HISTORY       Past Surgical History:   Procedure Laterality Date    CORONARY ANGIOPLASTY WITH STENT PLACEMENT      2019    HERNIA REPAIR      KNEE SURGERY Left     ACL    PTCA  12/30/2018    ROTATOR CUFF REPAIR Right          CURRENT MEDICATIONS       Discharge Medication List as of 9/17/2019  5:32 PM      CONTINUE these medications which have NOT CHANGED    Details   ARIPiprazole (ABILIFY) 5 MG tablet Take 1 tablet by mouth daily, Disp-30 tablet, R-3Normal      hydrOXYzine (ATARAX) 25 MG tablet TAKE 1 OR 2 TABLETS NIGHTLY AS NEEDED FOR ANXEITY, Disp-30 tablet, R-5Normal      carvedilol (COREG) 3.125 MG tablet Take 1 tablet by mouth 2 times daily (with meals), Disp-60 tablet, R-11Normal      ticagrelor (BRILINTA) 90 MG TABS tablet Take 1 tablet by mouth 2 times daily, Disp-60 tablet, R-11Normal      aspirin 81 MG chewable tablet Take 1 tablet by mouth daily, Disp-30 tablet, R-0Print      acetaminophen (TYLENOL) 500 MG tablet Take 500 mg by mouth every 6 hours as needed for PainHistorical Med               ALLERGIES     Codeine and Codeine    FAMILY HISTORY       Family History   Problem Relation Age of Onset    Diabetes Mother     Heart Disease Mother     Cancer Sister     Heart Disease Father     Diabetes Brother     Prostate Cancer Maternal Uncle     Stomach Cancer Maternal Grandmother     Cancer Maternal Grandfather          SOCIAL HISTORY       Social History     Socioeconomic History    Marital status:      Spouse name: None    Number of children: None    Years of education: None    Highest education level: None   Occupational History    None   Social Needs    Financial resource strain: None    Food insecurity:     Worry: None     Inability: None    Transportation needs:     Medical: None     Non-medical: None   Tobacco Use    Smoking status: Current Every Day Smoker     Packs/day: 0.50     Years: 18.00     Pack years: 9.00     Types: Cigarettes  Smokeless tobacco: Never Used    Tobacco comment: slowing down since the MI   Substance and Sexual Activity    Alcohol use: Yes     Alcohol/week: 28.0 standard drinks     Types: 28 Cans of beer per week     Comment: occ    Drug use: No    Sexual activity: Yes     Partners: Female   Lifestyle    Physical activity:     Days per week: None     Minutes per session: None    Stress: None   Relationships    Social connections:     Talks on phone: None     Gets together: None     Attends Hinduism service: None     Active member of club or organization: None     Attends meetings of clubs or organizations: None     Relationship status: None    Intimate partner violence:     Fear of current or ex partner: None     Emotionally abused: None     Physically abused: None     Forced sexual activity: None   Other Topics Concern    None   Social History Narrative    ** Merged History Encounter **            SCREENINGS      Heart Score for chest pain patients  History: Moderately Suspicious  ECG: Normal  Patient Age: < 39 years  *Risk factors for Atherosclerotic disease: Cigarette smoking, Hypertension, Coronary Artery Disease, Hypercholesterolemia  Risk Factors: > 3 Risk factors or history of atherosclerotic disease*  Troponin: < 1X normal limit  Heart Score Total: 3      PHYSICAL EXAM    (up to 7 for level 4, 8 ormore for level 5)     ED Triage Vitals [09/17/19 1401]   BP Temp Temp Source Pulse Resp SpO2 Height Weight   (!) 138/98 97.4 °F (36.3 °C) Oral 80 16 100 % 5' 10\" (1.778 m) 170 lb (77.1 kg)       Physical Exam   Constitutional: He is oriented to person, place, and time. He appears well-developed and well-nourished. HENT:   Head: Normocephalic and atraumatic. Neck: Normal range of motion. Cardiovascular: Normal rate. Pulmonary/Chest: Effort normal. No stridor. No respiratory distress. He has no wheezes. He has no rales. He exhibits no tenderness. Abdominal: Soft. He exhibits no distension.  There is no

## 2019-09-17 NOTE — ED TRIAGE NOTES
Chief Complaint   Patient presents with    Shortness of Breath     pt c/o increasing sob and fatigue over past week, denies cough/congestion/pain, also states has had some bleeding from right ear

## 2019-09-22 LAB — BLOOD CULTURE, ROUTINE: NORMAL

## 2019-10-26 DIAGNOSIS — F39 MOOD DISORDER (HCC): ICD-10-CM

## 2019-10-28 RX ORDER — ARIPIPRAZOLE 5 MG/1
TABLET ORAL
Qty: 30 TABLET | Refills: 0 | Status: SHIPPED | OUTPATIENT
Start: 2019-10-28 | End: 2021-05-11

## 2019-11-15 RX ORDER — TICAGRELOR 90 MG/1
TABLET ORAL
Qty: 180 TABLET | Refills: 1 | Status: SHIPPED | OUTPATIENT
Start: 2019-11-15 | End: 2019-11-18 | Stop reason: SDUPTHER

## 2019-11-26 RX ORDER — TICAGRELOR 90 MG/1
TABLET ORAL
Qty: 180 TABLET | Refills: 0 | Status: SHIPPED | OUTPATIENT
Start: 2019-11-26 | End: 2020-04-20

## 2019-11-30 ENCOUNTER — HOSPITAL ENCOUNTER (EMERGENCY)
Age: 44
Discharge: HOME | End: 2019-11-30
Attending: EMERGENCY MEDICINE
Payer: COMMERCIAL

## 2019-11-30 VITALS
SYSTOLIC BLOOD PRESSURE: 127 MMHG | DIASTOLIC BLOOD PRESSURE: 86 MMHG | HEART RATE: 81 BPM | WEIGHT: 175 LBS | TEMPERATURE: 97.9 F | OXYGEN SATURATION: 96 % | BODY MASS INDEX: 24.5 KG/M2 | HEIGHT: 71 IN | RESPIRATION RATE: 18 BRPM

## 2019-11-30 DIAGNOSIS — M79.671 RIGHT FOOT PAIN: Primary | ICD-10-CM

## 2019-11-30 PROCEDURE — 6370000000 HC RX 637 (ALT 250 FOR IP): Performed by: EMERGENCY MEDICINE

## 2019-11-30 PROCEDURE — 99283 EMERGENCY DEPT VISIT LOW MDM: CPT

## 2019-11-30 RX ORDER — INDOMETHACIN 50 MG/1
50 CAPSULE ORAL 2 TIMES DAILY WITH MEALS
Qty: 20 CAPSULE | Refills: 0 | Status: SHIPPED | OUTPATIENT
Start: 2019-11-30

## 2019-11-30 RX ORDER — SULFAMETHOXAZOLE AND TRIMETHOPRIM 800; 160 MG/1; MG/1
1 TABLET ORAL 2 TIMES DAILY
Qty: 14 TABLET | Refills: 0 | Status: SHIPPED | OUTPATIENT
Start: 2019-11-30 | End: 2019-12-07

## 2019-11-30 RX ORDER — CEPHALEXIN 500 MG/1
500 CAPSULE ORAL ONCE
Status: COMPLETED | OUTPATIENT
Start: 2019-11-30 | End: 2019-11-30

## 2019-11-30 RX ORDER — IBUPROFEN 400 MG/1
800 TABLET ORAL ONCE
Status: COMPLETED | OUTPATIENT
Start: 2019-11-30 | End: 2019-11-30

## 2019-11-30 RX ORDER — SULFAMETHOXAZOLE AND TRIMETHOPRIM 800; 160 MG/1; MG/1
1 TABLET ORAL ONCE
Status: COMPLETED | OUTPATIENT
Start: 2019-11-30 | End: 2019-11-30

## 2019-11-30 RX ORDER — CEPHALEXIN 500 MG/1
500 CAPSULE ORAL 4 TIMES DAILY
Qty: 28 CAPSULE | Refills: 0 | Status: SHIPPED | OUTPATIENT
Start: 2019-11-30 | End: 2019-12-07

## 2019-11-30 RX ADMIN — IBUPROFEN 800 MG: 400 TABLET, FILM COATED ORAL at 00:57

## 2019-11-30 RX ADMIN — CEPHALEXIN 500 MG: 500 CAPSULE ORAL at 00:57

## 2019-11-30 RX ADMIN — SULFAMETHOXAZOLE AND TRIMETHOPRIM 1 TABLET: 800; 160 TABLET ORAL at 00:57

## 2019-11-30 ASSESSMENT — PAIN DESCRIPTION - DESCRIPTORS: DESCRIPTORS: ACHING

## 2019-11-30 ASSESSMENT — PAIN SCALES - GENERAL
PAINLEVEL_OUTOF10: 5

## 2019-11-30 ASSESSMENT — PAIN DESCRIPTION - ORIENTATION: ORIENTATION: LEFT

## 2019-11-30 ASSESSMENT — PAIN DESCRIPTION - LOCATION: LOCATION: FOOT

## 2020-02-18 ENCOUNTER — TELEPHONE (OUTPATIENT)
Dept: CARDIOLOGY CLINIC | Age: 45
End: 2020-02-18

## 2020-02-19 RX ORDER — CARVEDILOL 3.12 MG/1
TABLET ORAL
Qty: 60 TABLET | Refills: 0 | Status: SHIPPED | OUTPATIENT
Start: 2020-02-19 | End: 2020-03-18

## 2020-03-18 RX ORDER — CARVEDILOL 3.12 MG/1
TABLET ORAL
Qty: 180 TABLET | Refills: 0 | Status: SHIPPED | OUTPATIENT
Start: 2020-03-18 | End: 2020-07-07

## 2020-04-20 RX ORDER — TICAGRELOR 90 MG/1
TABLET ORAL
Qty: 180 TABLET | Refills: 0 | Status: SHIPPED | OUTPATIENT
Start: 2020-04-20 | End: 2020-08-18

## 2020-06-12 ENCOUNTER — OFFICE VISIT (OUTPATIENT)
Dept: CARDIOLOGY CLINIC | Age: 45
End: 2020-06-12
Payer: COMMERCIAL

## 2020-06-12 ENCOUNTER — TELEPHONE (OUTPATIENT)
Dept: CARDIOLOGY CLINIC | Age: 45
End: 2020-06-12

## 2020-06-12 VITALS
HEART RATE: 64 BPM | BODY MASS INDEX: 24.06 KG/M2 | OXYGEN SATURATION: 97 % | WEIGHT: 172.5 LBS | DIASTOLIC BLOOD PRESSURE: 74 MMHG | SYSTOLIC BLOOD PRESSURE: 130 MMHG

## 2020-06-12 PROBLEM — R53.83 FATIGUE: Status: ACTIVE | Noted: 2020-06-12

## 2020-06-12 PROBLEM — R06.02 SOB (SHORTNESS OF BREATH): Status: ACTIVE | Noted: 2020-06-12

## 2020-06-12 PROCEDURE — G8427 DOCREV CUR MEDS BY ELIG CLIN: HCPCS | Performed by: INTERNAL MEDICINE

## 2020-06-12 PROCEDURE — G8420 CALC BMI NORM PARAMETERS: HCPCS | Performed by: INTERNAL MEDICINE

## 2020-06-12 PROCEDURE — 4004F PT TOBACCO SCREEN RCVD TLK: CPT | Performed by: INTERNAL MEDICINE

## 2020-06-12 PROCEDURE — 99214 OFFICE O/P EST MOD 30 MIN: CPT | Performed by: INTERNAL MEDICINE

## 2020-06-12 RX ORDER — BUSPIRONE HYDROCHLORIDE 10 MG/1
TABLET ORAL
COMMUNITY
Start: 2020-06-09 | End: 2021-05-11

## 2020-06-12 RX ORDER — PRAVASTATIN SODIUM 20 MG
20 TABLET ORAL DAILY
Qty: 30 TABLET | Refills: 5 | Status: SHIPPED | OUTPATIENT
Start: 2020-06-12 | End: 2020-11-12

## 2020-06-12 NOTE — TELEPHONE ENCOUNTER
Patient is to be scheduled for a LHC with Dr Josey Patel when he gets back from vacation. Patient is aware. They prefer that Friday if available. Medications were reviewed and non need held. Patient aware of this.

## 2020-06-12 NOTE — PROGRESS NOTES
been smoking cigarettes. He has a 9.00 pack-year smoking history. He has never used smokeless tobacco. He reports current alcohol use of about 3.0 standard drinks of alcohol per week. He reports that he does not use drugs. Family History:  No evidence for sudden cardiac death or premature CAD    Home Medications:  Reviewed and are listed in nursing record. and/or listed below  Current Outpatient Medications   Medication Sig Dispense Refill    BRILINTA 90 MG TABS tablet TAKE 1 TABLET BY MOUTH TWICE A  tablet 0    carvedilol (COREG) 3.125 MG tablet TAKE 1 TABLET BY MOUTH TWICE A DAY WITH MEALS 180 tablet 0    indomethacin (INDOCIN) 50 MG capsule Take 1 capsule by mouth 2 times daily (with meals) 20 capsule 0    ARIPiprazole (ABILIFY) 5 MG tablet TAKE (1) TABLET DAILY 30 tablet 0    acetaminophen (TYLENOL) 500 MG tablet Take 500 mg by mouth every 6 hours as needed for Pain      aspirin 81 MG chewable tablet Take 1 tablet by mouth daily 30 tablet 0    busPIRone (BUSPAR) 10 MG tablet       hydrOXYzine (ATARAX) 25 MG tablet TAKE 1 OR 2 TABLETS NIGHTLY AS NEEDED FOR ANXEITY (Patient not taking: Reported on 6/12/2020) 30 tablet 5     No current facility-administered medications for this visit. Allergies:  Codeine and Codeine     Review of Systems:   A 14 point review of symptoms completed. Pertinent positives identified in the HPI, all other review of symptoms negative as below.     Objective:   PHYSICAL EXAM:    Vitals:    06/12/20 1436   BP: 130/74   Pulse: 64   SpO2: 97%    Weight: 172 lb 8 oz (78.2 kg)     Wt Readings from Last 3 Encounters:   06/12/20 172 lb 8 oz (78.2 kg)   11/30/19 175 lb (79.4 kg)   09/17/19 170 lb (77.1 kg)         General Appearance:  Alert, cooperative, no distress, appears stated age   Head:  Normocephalic, atraumatic   Eyes:  PERRL, conjunctiva/corneas clear   Nose: Nares normal, no drainage or sinus tenderness   Throat: Lips, mucosa, and tongue normal   Neck: Supple, symmetrical, trachea midline, NL thyroid no carotid bruit or JVD   Lungs:   CTAB, respirations unlabored   Chest Wall:  No tenderness or deformity   Heart:  Regular rhythm and normal rate; S1, S2 are normal;   no murmur noted; no rub or gallop   Abdomen:   Soft, non-tender, +BS x 4, no masses, no organomegaly   Extremities: Extremities normal, atraumatic, no cyanosis or edema   Pulses: 2+ and symmetric   Skin: Skin color, texture, turgor normal, no rashes or lesions   Pysch: Normal mood and affect   Neurologic: Normal gross motor and sensory exam.         LABS   CBC:      Lab Results   Component Value Date    WBC 8.0 09/17/2019    RBC 5.65 09/17/2019    HGB 16.1 09/17/2019    HCT 48.9 09/17/2019    MCV 86.6 09/17/2019    RDW 14.5 09/17/2019     09/17/2019     CMP:  Lab Results   Component Value Date     09/17/2019    K 4.3 09/17/2019    K 4.0 04/03/2019     09/17/2019    CO2 26 09/17/2019    BUN 10 09/17/2019    CREATININE 1.0 09/17/2019    GFRAA >60 09/17/2019    GFRAA >60 10/27/2011    AGRATIO 1.3 09/17/2019    LABGLOM >60 09/17/2019    GLUCOSE 112 09/17/2019    PROT 8.2 09/17/2019    PROT 8.6 10/27/2011    CALCIUM 9.8 09/17/2019    BILITOT <0.2 09/17/2019    ALKPHOS 86 09/17/2019    AST 18 09/17/2019    ALT 11 09/17/2019       Lab Results   Component Value Date    ALT 11 09/17/2019    AST 18 09/17/2019    ALKPHOS 86 09/17/2019    BILITOT <0.2 09/17/2019     Lab Results   Component Value Date    LABA1C 5.2 12/31/2018     Lipids:         Lab Results   Component Value Date    TRIG 194 (H) 12/31/2018    TRIG 106 12/30/2018            Lab Results   Component Value Date    HDL 45 05/31/2019    HDL 49 12/31/2018    HDL 58 12/30/2018            Lab Results   Component Value Date    LDLCALC 174 (H) 05/31/2019    LDLCALC 62 12/31/2018    LDLCALC 86 12/30/2018            Lab Results   Component Value Date    LABVLDL 43 05/31/2019    LABVLDL 39 12/31/2018    LABVLDL 21 12/30/2018         CARDIAC DATA cardiomyopathy: LVEF 40%--> 55%   - full recovery  3. Tobacco abuse: education  4. Hx of EToH Abuse/Cocaine use  5. Hypovitamin D: repleted     MD PLAN  1. Some progressive fatigue, SOB returning, ? Anginal sign   - concern for unstable angina  2. D/w pt and plan for LHC to eval  3. Start pravachol 10mg poqHs  4. ASA 81mg poqday for life, ticagrelor 90mg po BID for 1 yr   5. Ok to continue coreg with hx of cocaine use  6. Avoid ETOH, tobacco and cocaine-> d/w avoidance of all in hosp      Patient Active Problem List   Diagnosis    Other forms of angina pectoris ([address]    NSTEMI s/p PCI and stenting of two LAD lesions    Tobacco smoker    Coronary artery disease    Anxiety and depression    Alcohol use vs abuse    Cocaine use    Mixed hyperlipidemia    SOB (shortness of breath)    Fatigue       Patient Plan:  1. Angiogram (left heart cath) discussed   ~patient prefers to proceed   ~no medications need held the morning of the procedure  2. Recommend starting Pravachol (pravastatin) 10 mg nightly  3. Follow up will be after the procedure       It is a pleasure to assist in the care of [address] [place] [address]. Please call with any questions. This note was scribed in the presence of Salvador Hopkins MD by Jean Short RN.      Gorge Barnhart MD, personally performed the services described in this documentation as scribed by the above signed scribe in my presence, and it is both accurate and complete to the best of our ability and knowledge. I agree with the details independently gathered by my clinical support staff, while the remaining scribed note accurately describes my personal service to the patient. The above RN is working as a scribe for and in the presence of myself . Working as a scribe, the RN may have prepopulated components of this note with my historical intellectual property under my direct supervision. Any additions to this intellectual property were performed at my direction.   Furthermore, the content and accuracy of this note have been reviewed by me to the best of my ability.         Kathy Love MD, [address] [facility] Cardiologist  [facility], [place], operated by [facility]  [phone] [facility]  [phone] [address]  6/12/2020  2:57 PM

## 2020-06-12 NOTE — LETTER
Throat: Lips, mucosa, and tongue normal   Neck: Supple, symmetrical, trachea midline, NL thyroid no carotid bruit or JVD   Lungs:   CTAB, respirations unlabored   Chest Wall:  No tenderness or deformity   Heart:  Regular rhythm and normal rate; S1, S2 are normal;   no murmur noted; no rub or gallop   Abdomen:   Soft, non-tender, +BS x 4, no masses, no organomegaly   Extremities: Extremities normal, atraumatic, no cyanosis or edema   Pulses: 2+ and symmetric   Skin: Skin color, texture, turgor normal, no rashes or lesions   Pysch: Normal mood and affect   Neurologic: Normal gross motor and sensory exam.         LABS   CBC:      Lab Results   Component Value Date    WBC 8.0 09/17/2019    RBC 5.65 09/17/2019    HGB 16.1 09/17/2019    HCT 48.9 09/17/2019    MCV 86.6 09/17/2019    RDW 14.5 09/17/2019     09/17/2019     CMP:  Lab Results   Component Value Date     09/17/2019    K 4.3 09/17/2019    K 4.0 04/03/2019     09/17/2019    CO2 26 09/17/2019    BUN 10 09/17/2019    CREATININE 1.0 09/17/2019    GFRAA >60 09/17/2019    GFRAA >60 10/27/2011    AGRATIO 1.3 09/17/2019    LABGLOM >60 09/17/2019    GLUCOSE 112 09/17/2019    PROT 8.2 09/17/2019    PROT 8.6 10/27/2011    CALCIUM 9.8 09/17/2019    BILITOT <0.2 09/17/2019    ALKPHOS 86 09/17/2019    AST 18 09/17/2019    ALT 11 09/17/2019       Lab Results   Component Value Date    ALT 11 09/17/2019    AST 18 09/17/2019    ALKPHOS 86 09/17/2019    BILITOT <0.2 09/17/2019     Lab Results   Component Value Date    LABA1C 5.2 12/31/2018     Lipids:         Lab Results   Component Value Date    TRIG 194 (H) 12/31/2018    TRIG 106 12/30/2018            Lab Results   Component Value Date    HDL 45 05/31/2019    HDL 49 12/31/2018    HDL 58 12/30/2018            Lab Results   Component Value Date    LDLCALC 174 (H) 05/31/2019    LDLCALC 62 12/31/2018    LDLCALC 86 12/30/2018            Lab Results   Component Value Date    LABVLDL 43 05/31/2019 - 12/30/2018 PCi to mid LAD using 2 SILVANA for plaque rupture  2. Ischemic cardiomyopathy: LVEF 40%--> 55%   - full recovery  3. Tobacco abuse: education  4. Hx of EToH Abuse/Cocaine use  5. Hypovitamin D: repleted     MD PLAN  1. Some progressive fatigue, SOB returning, ? Anginal sign   - concern for unstable angina  2. D/w pt and plan for LHC to eval  3. Start pravachol 10mg poqHs  4. ASA 81mg poqday for life, ticagrelor 90mg po BID for 1 yr   5. Ok to continue coreg with hx of cocaine use  6. Avoid ETOH, tobacco and cocaine-> d/w avoidance of all in hosp      Patient Active Problem List   Diagnosis    Other forms of angina pectoris ([address]    NSTEMI s/p PCI and stenting of two LAD lesions    Tobacco smoker    Coronary artery disease    Anxiety and depression    Alcohol use vs abuse    Cocaine use    Mixed hyperlipidemia    SOB (shortness of breath)    Fatigue       Patient Plan:  1. Angiogram (left heart cath) discussed   ~patient prefers to proceed   ~no medications need held the morning of the procedure  2. Recommend starting Pravachol (pravastatin) 10 mg nightly  3. Follow up will be after the procedure       It is a pleasure to assist in the care of [address] [place] [address]. Please call with any questions. This note was scribed in the presence of Ramirez Wells MD by Laura Barahona RN.      Mallory Garcia MD, personally performed the services described in this documentation as scribed by the above signed scribe in my presence, and it is both accurate and complete to the best of our ability and knowledge. I agree with the details independently gathered by my clinical support staff, while the remaining scribed note accurately describes my personal service to the patient. The above RN is working as a scribe for and in the presence of myself .   Working as a scribe, the RN may have prepopulated components of this note with my historical intellectual property under my direct supervision. Any additions to this intellectual property were performed at my direction. Furthermore, the content and accuracy of this note have been reviewed by me to the best of my ability.         Es John MD, [address] [facility] Cardiologist  [facility]  [phone] [facility]  [phone] [address] [place] [address]  6/12/2020  2:57 PM

## 2020-06-22 ENCOUNTER — HOSPITAL ENCOUNTER (EMERGENCY)
Age: 45
Discharge: HOME | End: 2020-06-22
Attending: EMERGENCY MEDICINE
Payer: COMMERCIAL

## 2020-06-22 ENCOUNTER — OFFICE VISIT (OUTPATIENT)
Dept: PRIMARY CARE CLINIC | Age: 45
End: 2020-06-22
Payer: COMMERCIAL

## 2020-06-22 VITALS
BODY MASS INDEX: 25.05 KG/M2 | TEMPERATURE: 98.5 F | OXYGEN SATURATION: 96 % | SYSTOLIC BLOOD PRESSURE: 130 MMHG | RESPIRATION RATE: 15 BRPM | DIASTOLIC BLOOD PRESSURE: 83 MMHG | HEIGHT: 70 IN | HEART RATE: 69 BPM | WEIGHT: 175 LBS

## 2020-06-22 PROCEDURE — 99282 EMERGENCY DEPT VISIT SF MDM: CPT

## 2020-06-22 PROCEDURE — 99211 OFF/OP EST MAY X REQ PHY/QHP: CPT | Performed by: PHYSICIAN ASSISTANT

## 2020-06-22 PROCEDURE — G8428 CUR MEDS NOT DOCUMENT: HCPCS | Performed by: PHYSICIAN ASSISTANT

## 2020-06-22 PROCEDURE — 6370000000 HC RX 637 (ALT 250 FOR IP): Performed by: EMERGENCY MEDICINE

## 2020-06-22 PROCEDURE — G8419 CALC BMI OUT NRM PARAM NOF/U: HCPCS | Performed by: PHYSICIAN ASSISTANT

## 2020-06-22 PROCEDURE — 41800 DRAINAGE OF GUM LESION: CPT

## 2020-06-22 RX ORDER — IBUPROFEN 600 MG/1
600 TABLET ORAL EVERY 6 HOURS PRN
Qty: 20 TABLET | Refills: 0 | Status: SHIPPED | OUTPATIENT
Start: 2020-06-22 | End: 2020-11-22 | Stop reason: ALTCHOICE

## 2020-06-22 RX ORDER — LIDOCAINE HYDROCHLORIDE 20 MG/ML
20 SOLUTION OROPHARYNGEAL EVERY 4 HOURS PRN
Qty: 2 BOTTLE | Refills: 0 | Status: SHIPPED | OUTPATIENT
Start: 2020-06-22 | End: 2020-06-27

## 2020-06-22 RX ORDER — AMOXICILLIN AND CLAVULANATE POTASSIUM 875; 125 MG/1; MG/1
1 TABLET, FILM COATED ORAL 2 TIMES DAILY
Qty: 14 TABLET | Refills: 0 | Status: SHIPPED | OUTPATIENT
Start: 2020-06-22 | End: 2020-06-29

## 2020-06-22 RX ORDER — IBUPROFEN 400 MG/1
400 TABLET ORAL ONCE
Status: COMPLETED | OUTPATIENT
Start: 2020-06-22 | End: 2020-06-22

## 2020-06-22 RX ORDER — AMOXICILLIN AND CLAVULANATE POTASSIUM 875; 125 MG/1; MG/1
1 TABLET, FILM COATED ORAL ONCE
Status: COMPLETED | OUTPATIENT
Start: 2020-06-22 | End: 2020-06-22

## 2020-06-22 RX ORDER — OXYCODONE HYDROCHLORIDE AND ACETAMINOPHEN 5; 325 MG/1; MG/1
1 TABLET ORAL ONCE
Status: COMPLETED | OUTPATIENT
Start: 2020-06-22 | End: 2020-06-22

## 2020-06-22 RX ADMIN — AMOXICILLIN AND CLAVULANATE POTASSIUM 1 TABLET: 875; 125 TABLET, FILM COATED ORAL at 02:06

## 2020-06-22 RX ADMIN — IBUPROFEN 400 MG: 400 TABLET, FILM COATED ORAL at 02:06

## 2020-06-22 RX ADMIN — OXYCODONE HYDROCHLORIDE AND ACETAMINOPHEN 1 TABLET: 5; 325 TABLET ORAL at 02:06

## 2020-06-22 ASSESSMENT — PAIN SCALES - GENERAL
PAINLEVEL_OUTOF10: 7
PAINLEVEL_OUTOF10: 7

## 2020-06-22 ASSESSMENT — PAIN DESCRIPTION - ORIENTATION: ORIENTATION: RIGHT

## 2020-06-22 ASSESSMENT — PAIN DESCRIPTION - LOCATION: LOCATION: FACE

## 2020-06-22 ASSESSMENT — PAIN DESCRIPTION - DESCRIPTORS: DESCRIPTORS: STABBING;SHARP

## 2020-06-22 ASSESSMENT — PAIN DESCRIPTION - PAIN TYPE: TYPE: ACUTE PAIN

## 2020-06-22 ASSESSMENT — PAIN DESCRIPTION - FREQUENCY: FREQUENCY: CONTINUOUS

## 2020-06-22 NOTE — ED PROVIDER NOTES
MD   lidocaine viscous hcl (XYLOCAINE) 2 % SOLN solution Take 20 mLs by mouth every 4 hours as needed for Dental Pain or Pain Take 15 mLs by mouth every 4 hours as needed for Irritation or Pain.   You can swish and swallow or gargle 6/22/20 6/27/20 Yes Kelsey Jimenez MD   busPIRone (BUSPAR) 10 MG tablet  6/9/20   Historical Provider, MD   pravastatin (PRAVACHOL) 20 MG tablet Take 1 tablet by mouth daily 6/12/20   Judie Tamayo MD   BRILINTA 90 MG TABS tablet TAKE 1 TABLET BY MOUTH TWICE A DAY 4/20/20   Judie Tamayo MD   carvedilol (COREG) 3.125 MG tablet TAKE 1 TABLET BY MOUTH TWICE A DAY WITH MEALS 3/18/20   Judie Tamayo MD   indomethacin (INDOCIN) 50 MG capsule Take 1 capsule by mouth 2 times daily (with meals) 11/30/19   Yury Richard,    ARIPiprazole (ABILIFY) 5 MG tablet TAKE (1) TABLET DAILY 10/28/19   CATERINA Parrish CNP   hydrOXYzine (ATARAX) 25 MG tablet TAKE 1 OR 2 TABLETS NIGHTLY AS NEEDED FOR ANXEITY  Patient not taking: Reported on 6/12/2020 2/28/19   CATERINA Parrish CNP   acetaminophen (TYLENOL) 500 MG tablet Take 500 mg by mouth every 6 hours as needed for Pain    Historical Provider, MD   aspirin 81 MG chewable tablet Take 1 tablet by mouth daily 1/2/19   Charles Rodriguez DO       Allergies as of 06/22/2020 - Review Complete 06/12/2020   Allergen Reaction Noted    Codeine Nausea Only 09/10/2013    Codeine Nausea Only 09/16/2013       Past Medical History:   Diagnosis Date    Anxiety     Depression     NSTEMI (non-ST elevated myocardial infarction) [address] [address] 12/30/2018    Positive urine drug screen 12/2018    opiate, cocaine, benzos    Reflux     Traumatic rupture of spleen     Wears glasses         Surgical History:   Past Surgical History:   Procedure Laterality Date    CORONARY ANGIOPLASTY WITH STENT PLACEMENT      2019    HERNIA REPAIR      KNEE SURGERY Left     ACL    PTCA  12/30/2018    ROTATOR CUFF REPAIR Right         Family History:    Family History Group      /83      Pulse 69      Resp 15      Temp 98.5 °F (36.9 °C)      Temp Source Oral      SpO2 96 %      Weight 175 lb (79.4 kg)      Height 5' 10\" (1.778 m)      Head Circumference       Peak Flow       Pain Score       Pain Loc       Pain Edu? Excl. in [address]? GENERAL:    Awake, alert. Well developed, well nourished with no apparent distress. Nontoxic-appearing, non-ill-appearing. HENT:    Normocephalic, Atraumatic, no lacerations. Oropharynx clear, no tonsilar inflammation, no tonsilar exudates,   no airway obstruction, moist mucous membranes. Uvula was midline and has symmetric rise. Around tooth #2 and 3 there is a palpable fluctuant area in the gum right above the teeth, there is one cracked tooth, #3. The buccal mucosa and the outer cheek is erythematous mildly edematous but no obvious fluctuance of the cheek itself. Sublingual area of the mouth is soft, nonindurated  EYES:    Conjunctiva normal,   Pupils equal round and reactive to light,   Extraocular movements normal.  NECK:    Trachea is midline. No stridor. No lymphadenopathy of the anterior cervical chain   no lymphadenopathy of the posterior cervical chain. CHEST:    Regular rate and regular rhythm, no murmurs/rubs/gallops,   normal S1/S2, chest wall non-tender. LUNGS:    No respiratory distress. No abdominal retractions, no sternal retractions. Clear to auscultation bilaterally, no wheezing, no rhochi, no rales  ABDOMEN:    Soft, non-tender, non-distended. No guarding. No rebound. EXTREMITIES:   Moves extremities x4 with purpose. Normal range of motion, no edema,   no tenderness, no deformity,   distal pulses present and equal bilaterally. SKIN:    Warm, dry and intact. NEUROLOGIC:  Normal mental status. Moving all extremities to command. Alert and oriented x4   without focal motor deficit or gross sensory deficit. Normal speech.     PSYCHIATRIC:  Not anxious,   normal mood and affect,   thoughts are linear and organized,   without delusions/hallucinations,   responds appropriately to questions      LABS and DIAGNOSTIC RESULTS    LABS  No results found for this visit on 06/22/20. PROCEDURES  PROCEDURE:  Charles Mackenzie or their surrogate had an opportunity to ask questions, and the risks, benefits, and alternatives were discussed. The abscess was prepped. A local anesthetic was used to completely anesthetize the abscess. A cruciate incision was made to keep the abscess open so it will continue to drain. Proximally 1 cc of pus was drained from the incision. Packed with gauze. There were no complications during the procedure. MEDICAL DECISION MAKING    Procedures/interventions/images ordered for this visit  No orders of the defined types were placed in this encounter. Medications ordered for this visit  Orders Placed This Encounter   Medications    amoxicillin-clavulanate (AUGMENTIN) 875-125 MG per tablet 1 tablet    oxyCODONE-acetaminophen (PERCOCET) 5-325 MG per tablet 1 tablet    ibuprofen (ADVIL;MOTRIN) tablet 400 mg    amoxicillin-clavulanate (AUGMENTIN) 875-125 MG per tablet     Sig: Take 1 tablet by mouth 2 times daily for 7 days     Dispense:  14 tablet     Refill:  0    ibuprofen (ADVIL;MOTRIN) 600 MG tablet     Sig: Take 1 tablet by mouth every 6 hours as needed for Pain or Fever     Dispense:  20 tablet     Refill:  0    lidocaine viscous hcl (XYLOCAINE) 2 % SOLN solution     Sig: Take 20 mLs by mouth every 4 hours as needed for Dental Pain or Pain Take 15 mLs by mouth every 4 hours as needed for Irritation or Pain. You can swish and swallow or gargle     Dispense:  2 Bottle     Refill:  0       ED course notes for this visit       I wore goggles, surgical mask, N95 mask and gloves when I evaluated the patient. - Patient seen and evaluated in room 7    Patient seen and examined.   In addition to the noted physical exam, I did not

## 2020-06-23 LAB — SARS-COV-2, PCR: NOT DETECTED

## 2020-06-26 ENCOUNTER — HOSPITAL ENCOUNTER (OUTPATIENT)
Dept: CARDIAC CATH/INVASIVE PROCEDURES | Age: 45
Discharge: HOME | End: 2020-06-26
Attending: INTERNAL MEDICINE | Admitting: INTERNAL MEDICINE
Payer: COMMERCIAL

## 2020-06-26 VITALS — WEIGHT: 164.6 LBS | HEIGHT: 70 IN | BODY MASS INDEX: 23.56 KG/M2

## 2020-06-26 LAB
A/G RATIO: 1.3 (ref 1.1–2.2)
ALBUMIN SERPL-MCNC: 4.6 G/DL (ref 3.4–5)
ALP BLD-CCNC: 91 U/L (ref 40–129)
ALT SERPL-CCNC: 10 U/L (ref 10–40)
ANION GAP SERPL CALCULATED.3IONS-SCNC: 11 MMOL/L (ref 3–16)
AST SERPL-CCNC: 14 U/L (ref 15–37)
BILIRUB SERPL-MCNC: 0.3 MG/DL (ref 0–1)
BUN BLDV-MCNC: 19 MG/DL (ref 7–20)
CALCIUM SERPL-MCNC: 9.5 MG/DL (ref 8.3–10.6)
CHLORIDE BLD-SCNC: 101 MMOL/L (ref 99–110)
CHOLESTEROL, FASTING: 174 MG/DL (ref 0–199)
CO2: 26 MMOL/L (ref 21–32)
CREAT SERPL-MCNC: 1.4 MG/DL (ref 0.9–1.3)
EKG ATRIAL RATE: 49 BPM
EKG DIAGNOSIS: NORMAL
EKG P AXIS: 53 DEGREES
EKG P-R INTERVAL: 192 MS
EKG Q-T INTERVAL: 462 MS
EKG QRS DURATION: 100 MS
EKG QTC CALCULATION (BAZETT): 417 MS
EKG R AXIS: -7 DEGREES
EKG T AXIS: 52 DEGREES
EKG VENTRICULAR RATE: 49 BPM
GFR AFRICAN AMERICAN: >60
GFR NON-AFRICAN AMERICAN: 55
GLOBULIN: 3.5 G/DL
GLUCOSE BLD-MCNC: 139 MG/DL (ref 70–99)
HCT VFR BLD CALC: 39.6 % (ref 40.5–52.5)
HDLC SERPL-MCNC: 32 MG/DL (ref 40–60)
HEMOGLOBIN: 12.9 G/DL (ref 13.5–17.5)
INR BLD: 1.14 (ref 0.86–1.14)
LDL CHOLESTEROL CALCULATED: 97 MG/DL
LEFT VENTRICULAR EJECTION FRACTION HIGH VALUE: 55 %
MCH RBC QN AUTO: 27.2 PG (ref 26–34)
MCHC RBC AUTO-ENTMCNC: 32.5 G/DL (ref 31–36)
MCV RBC AUTO: 83.8 FL (ref 80–100)
PDW BLD-RTO: 14 % (ref 12.4–15.4)
PLATELET # BLD: 312 K/UL (ref 135–450)
PMV BLD AUTO: 8.5 FL (ref 5–10.5)
POTASSIUM SERPL-SCNC: 3.8 MMOL/L (ref 3.5–5.1)
PROTHROMBIN TIME: 13.3 SEC (ref 10–13.2)
RBC # BLD: 4.73 M/UL (ref 4.2–5.9)
SODIUM BLD-SCNC: 138 MMOL/L (ref 136–145)
TOTAL PROTEIN: 8.1 G/DL (ref 6.4–8.2)
TRIGLYCERIDE, FASTING: 226 MG/DL (ref 0–150)
VLDLC SERPL CALC-MCNC: 45 MG/DL
WBC # BLD: 11.2 K/UL (ref 4–11)

## 2020-06-26 PROCEDURE — 2580000003 HC RX 258

## 2020-06-26 PROCEDURE — 93458 L HRT ARTERY/VENTRICLE ANGIO: CPT | Performed by: INTERNAL MEDICINE

## 2020-06-26 PROCEDURE — 99152 MOD SED SAME PHYS/QHP 5/>YRS: CPT

## 2020-06-26 PROCEDURE — C1894 INTRO/SHEATH, NON-LASER: HCPCS

## 2020-06-26 PROCEDURE — 93458 L HRT ARTERY/VENTRICLE ANGIO: CPT

## 2020-06-26 PROCEDURE — 85610 PROTHROMBIN TIME: CPT

## 2020-06-26 PROCEDURE — 6360000004 HC RX CONTRAST MEDICATION

## 2020-06-26 PROCEDURE — 2500000003 HC RX 250 WO HCPCS

## 2020-06-26 PROCEDURE — 99153 MOD SED SAME PHYS/QHP EA: CPT

## 2020-06-26 PROCEDURE — 80061 LIPID PANEL: CPT

## 2020-06-26 PROCEDURE — C1769 GUIDE WIRE: HCPCS

## 2020-06-26 PROCEDURE — 2709999900 HC NON-CHARGEABLE SUPPLY

## 2020-06-26 PROCEDURE — 85027 COMPLETE CBC AUTOMATED: CPT

## 2020-06-26 PROCEDURE — 6360000002 HC RX W HCPCS

## 2020-06-26 PROCEDURE — 80053 COMPREHEN METABOLIC PANEL: CPT

## 2020-06-26 RX ORDER — HEPARIN SODIUM 1000 [USP'U]/ML
INJECTION, SOLUTION INTRAVENOUS; SUBCUTANEOUS
Status: COMPLETED | OUTPATIENT
Start: 2020-06-26 | End: 2020-06-26

## 2020-06-26 RX ORDER — FENTANYL CITRATE 50 UG/ML
INJECTION, SOLUTION INTRAMUSCULAR; INTRAVENOUS
Status: COMPLETED | OUTPATIENT
Start: 2020-06-26 | End: 2020-06-26

## 2020-06-26 RX ORDER — SODIUM CHLORIDE 9 MG/ML
INJECTION, SOLUTION INTRAVENOUS ONCE
Status: DISCONTINUED | OUTPATIENT
Start: 2020-06-26 | End: 2020-06-26 | Stop reason: HOSPADM

## 2020-06-26 RX ORDER — ARGININE HCL 1000 MG
1000 TABLET ORAL 2 TIMES DAILY
Qty: 30 TABLET | Refills: 3 | Status: SHIPPED
Start: 2020-06-26 | End: 2020-11-18

## 2020-06-26 RX ORDER — MIDAZOLAM HYDROCHLORIDE 5 MG/ML
INJECTION INTRAMUSCULAR; INTRAVENOUS
Status: COMPLETED | OUTPATIENT
Start: 2020-06-26 | End: 2020-06-26

## 2020-06-26 RX ADMIN — HEPARIN SODIUM 5000 UNITS: 1000 INJECTION, SOLUTION INTRAVENOUS; SUBCUTANEOUS at 09:36

## 2020-06-26 RX ADMIN — FENTANYL CITRATE 25 MCG: 50 INJECTION, SOLUTION INTRAMUSCULAR; INTRAVENOUS at 09:20

## 2020-06-26 RX ADMIN — MIDAZOLAM HYDROCHLORIDE 2 MG: 5 INJECTION INTRAMUSCULAR; INTRAVENOUS at 09:20

## 2020-06-26 RX ADMIN — HEPARIN SODIUM 3700 UNITS: 1000 INJECTION, SOLUTION INTRAVENOUS; SUBCUTANEOUS at 09:36

## 2020-06-26 NOTE — BRIEF OP NOTE
Brief Postoperative Note      Patient: Marylou Navarrete  YOB: 1975  MRN: [id number]    Brief Postoperative Note  Pre-operative Diagnosis: unstable angina  Post-operative Diagnosis: Same  Procedure: Moderate sedation  US access to Rt radial artery  LHC  LVG  Cors    Anesthesia: Moderate Sedation  Surgeons/Assistants: Hernandez Hines [facility], [facility]  Estimated Blood Loss: less than 50   Complications: None  Specimens: Was Not Obtained    Findings:     LEFT HEART CATH  LM: luminals  LAD: long stented section from prox-mid with mild restenosis <25%. Just past stent in mid/distal with 40-50%  LCX: luminals  RCA: dominant, mild diffuse disease in prox/mid up to 20%    LVEDP: 6  LVEF: 55%      Assessment  1. Patent LAD stents  2.  Nonobstructive CAD   - consider L-arginine or Ranexa            Electronically signed by Kev Bradford MD on 6/26/2020 at 9:43 AM

## 2020-07-07 RX ORDER — CARVEDILOL 3.12 MG/1
TABLET ORAL
Qty: 60 TABLET | Refills: 2 | Status: SHIPPED | OUTPATIENT
Start: 2020-07-07 | End: 2020-07-15

## 2020-07-15 RX ORDER — CARVEDILOL 3.12 MG/1
TABLET ORAL
Qty: 180 TABLET | Refills: 3 | Status: SHIPPED | OUTPATIENT
Start: 2020-07-15 | End: 2020-11-10

## 2020-08-18 RX ORDER — TICAGRELOR 90 MG/1
TABLET ORAL
Qty: 180 TABLET | Refills: 3 | Status: SHIPPED
Start: 2020-08-18 | End: 2020-11-18 | Stop reason: ALTCHOICE

## 2020-11-09 ENCOUNTER — TELEPHONE (OUTPATIENT)
Dept: PULMONOLOGY | Age: 45
End: 2020-11-09

## 2020-11-09 NOTE — TELEPHONE ENCOUNTER
Within this Telehealth Consent, the terms you and yours refer to the person using the Telehealth Service (Service), or in the case of a use of the Service by or on behalf of a minor, you and yours refer to and include (i) the parent or legal guardian who provides consent to the use of the Service by such minor or uses the Service on behalf of such minor, and (ii) the minor for whom consent is being provided or on whose behalf the Service is being utilized. When using Service, you will be consulting with your health care providers via the use of Telehealth.   Telehealth involves the delivery of healthcare services using electronic communications, information technology or other means between a healthcare provider and a patient who are not in the same physical location. Telehealth may be used for diagnosis, treatment, follow-up and/or patient education, and may include, but is not limited to, one or more of the following:    Electronic transmission of medical records, photo images, personal health information or other data between a patient and a healthcare provider    Interactions between a patient and healthcare provider via audio, video and/or data communications    Use of output data from medical devices, sound and video files    Anticipated Benefits   The use of Telehealth by your Provider(s) through the Service may have the following possible benefits:    Making it easier and more efficient for you to access medical care and treatment for the conditions treated by such Provider(s) utilizing the Service    Allowing you to obtain medical care and treatment by Provider(s) at times that are convenient for you    Enabling you to interact with Provider(s) without the necessity of an in-office appointment     Possible Risks   While the use of Telehealth can provide potential benefits for you, there are also potential risks associated with the use of Telehealth.  These risks include, but may not be limited to the following:    Your Provider(s) may not able to provide medical treatment for your particular condition and you may be required to seek alternative healthcare or emergency care services.  The electronic systems or other security protocols or safeguards used in the Service could fail, causing a breach of privacy of your medical or other information.  Given regulatory requirements in certain jurisdictions, your Provider(s) diagnosis and/or treatment options, especially pertaining to certain prescriptions, may be limited. Acceptance   1. You understand that Services will be provided via Telehealth. This process involves the use of HIPAA compliant and secure, real-time audio-visual interfacing with a qualified and appropriately trained provider located at [facility]. 2. You understand that, under no circumstances, will this session be recorded. 3. You understand that the Provider(s) at [facility] and other clinical participants will be party to the information obtained during the Telehealth session in accordance with best medical practices. 4. You understand that the information obtained during the Telehealth session will be used to help determine the most appropriate treatment options. 5. You understand that You have the right to revoke this consent at any point in time. 6. You understand that Telehealth is voluntary, and that continued treatment is not dependent upon consent. 7. You understand that, in the event of non-consent to Telehealth services and/or technical difficulties, you will obtain services as typically provided in the absence of Telehealth technology. 8. You understand that this consent will be kept in Your medical record. 9. No potential benefits from the use of Telehealth or specific results can be guaranteed. Your condition may not be cured or improved and, in some cases, may get worse.    10. There are limitations in the provision of medical care and treatment via Telehealth and the Service and you may not be able to receive diagnosis and/or treatment through the Service for every condition for which you seek diagnosis and/or treatment. 11. There are potential risks to the use of Telehealth, including but not limited to the risks described in this Telehealth Consent. 12. Your Provider(s) have discussed the use of Telehealth and the Service with you, including the benefits and risks of such and you have provided oral consent to your Provider(s) for the use of Telehealth and the Service. 15. You understand that it is your duty to provide your Provider(s) truthful, accurate and complete information, including all relevant information regarding care that you may have received or may be receiving from other healthcare providers outside of the Service. 14. You understand that each of your Provider(s) may determine in his or sole discretion that your condition is not suitable for diagnosis and/or treatment using the Service, and that you may need to seek medical care and treatment a specialist or other healthcare provider, outside of the Service. 15. You understand that you are fully responsible for payment for all services provided by Provider(s) or through use of the Service and that you may not be able to use third-party insurance. 16. You represent that (a) you have read this Telehealth Consent carefully, (b) you understand the risks and benefits of the Service and the use of Telehealth in the medical care and treatment provided to you by Provider(s) using the Service, and (c) you have the legal capacity and authority to provide this consent for yourself and/or the minor for which you are consenting under applicable federal and state laws, including laws relating to the age of [de-identified] and/or parental/guardian consent.    17. You give your informed consent to the use of Telehealth by Provider(s) using the Service under the terms described in the Terms of Service and this Telehealth Consent. The patient was read the following statement and has consented to the visit as of 11/9/20. The patient has been scheduled for their first telehealth visit on 12/11/20 with Dr. Gust Collet.

## 2020-11-10 RX ORDER — CARVEDILOL 3.12 MG/1
TABLET ORAL
Qty: 180 TABLET | Refills: 1 | Status: SHIPPED | OUTPATIENT
Start: 2020-11-10 | End: 2021-07-13

## 2020-11-12 RX ORDER — PRAVASTATIN SODIUM 20 MG
TABLET ORAL
Qty: 30 TABLET | Refills: 5 | Status: SHIPPED | OUTPATIENT
Start: 2020-11-12

## 2020-11-17 NOTE — PROGRESS NOTES
[address] [facility]   Cardiovascular Evaluation    PATIENT: Suresh Mota  DATE: 2020  MRN: [id number]  CSN: [id number]  : 1975      Primary Care Doctor: CIARA Roberson  Reason for evaluation:   Follow-up; Coronary Artery Disease; Hyperlipidemia; Shortness of Breath (with activity); and Fatigue (no energy)      Subjective:   History of present illness on initial date of evaluation:   Suresh Mota is a 39 y.o. patient who presents for hospital follow up, s/p LHC. Patient was seen and admitted on 18 for chest pain. [facility] performed 18 successful PCI to high mid LAD for plaque rupture using 1 drug stent then PCI to lesion 2 in mid LAD for stenotic lesion threatening outflow. He reported feeling good. He denies cp, sob, palpitations, dizziness or syncope. He denies any bleeding or bruising. Last OV 20 reported over the last few months he had noticed increased fatigue. He stated he had noticed SOB with exertion and carrying heavy items. He had not experienced any chest pain. He stated the fatigue and SOB occur with activity, he is ok while resting. He denied any dizziness or syncope. Today he reports he is still SOB and fatigued. Since last OV he underwent an elective LHC on 20 that demonstrated patent LAD stents, non obstructive CAD. He states he has been referred to pulmonary by his PCP, but is hard to see anyone due to [place]. Pulmonary only doing VV. States the L-arginine is not helping. He is taking all his other medications as prescribed. He is smoking 1/2 PPD. States he has cut back from 2 PPD.        Patient Active Problem List   Diagnosis    Other forms of angina pectoris ([facility] [address]    NSTEMI s/p PCI and stenting of two LAD lesions    Tobacco smoker    Coronary artery disease    Anxiety and depression    Alcohol use vs abuse    Cocaine use    Mixed hyperlipidemia    SOB (shortness of breath)    Fatigue         Past Medical History:   has a past medical history of Anxiety, Depression, NSTEMI (non-ST elevated myocardial infarction) ([facility] [address] Positive urine drug screen, Reflux, Traumatic rupture of spleen, and Wears glasses. Surgical History:   has a past surgical history that includes knee surgery (Left); hernia repair; Rotator cuff repair (Right); Percutaneous Transluminal Coronary Angio (12/30/2018); and Coronary angioplasty with stent. Social History:   reports that he has been smoking cigarettes. He has a 9.00 pack-year smoking history. He has never used smokeless tobacco. He reports current alcohol use of about 3.0 standard drinks of alcohol per week. He reports that he does not use drugs. Family History:  No evidence for sudden cardiac death or premature CAD    Home Medications:  Reviewed and are listed in nursing record.  and/or listed below  Current Outpatient Medications   Medication Sig Dispense Refill    pravastatin (PRAVACHOL) 20 MG tablet TAKE (1) TABLET DAILY 30 tablet 5    carvedilol (COREG) 3.125 MG tablet TAKE (1) TABLET TWICE A DAY WITH MEALS 180 tablet 1    BRILINTA 90 MG TABS tablet TAKE ONE TABLET BY MOUTH TWICE A  tablet 3    busPIRone (BUSPAR) 10 MG tablet       indomethacin (INDOCIN) 50 MG capsule Take 1 capsule by mouth 2 times daily (with meals) 20 capsule 0    ARIPiprazole (ABILIFY) 5 MG tablet TAKE (1) TABLET DAILY 30 tablet 0    acetaminophen (TYLENOL) 500 MG tablet Take 500 mg by mouth every 6 hours as needed for Pain      aspirin 81 MG chewable tablet Take 1 tablet by mouth daily 30 tablet 0    l-arginine 1000 MG TABS Take 1,000 mg by mouth 2 times daily (Patient not taking: Reported on 11/18/2020) 30 tablet 3    ibuprofen (ADVIL;MOTRIN) 600 MG tablet Take 1 tablet by mouth every 6 hours as needed for Pain or Fever 20 tablet 0    hydrOXYzine (ATARAX) 25 MG tablet TAKE 1 OR 2 TABLETS NIGHTLY AS NEEDED FOR ANXEITY (Patient not taking: Reported on 6/12/2020) 30 tablet 5     No current facility-administered medications for this visit. Allergies:  Codeine and Codeine     Review of Systems:   A 14 point review of symptoms completed. Pertinent positives identified in the HPI, all other review of symptoms negative as below.     Objective:   PHYSICAL EXAM:    Vitals:    11/18/20 1043   BP: 120/72   Pulse: 55   SpO2: 98%    Weight: 170 lb (77.1 kg)     Wt Readings from Last 3 Encounters:   11/18/20 170 lb (77.1 kg)   06/26/20 164 lb 9.6 oz (74.7 kg)   06/22/20 175 lb (79.4 kg)         General Appearance:  Alert, cooperative, no distress, appears stated age   Head:  Normocephalic, atraumatic   Eyes:  PERRL, conjunctiva/corneas clear   Nose: Nares normal, no drainage or sinus tenderness   Throat: Lips, mucosa, and tongue normal   Neck: Supple, symmetrical, trachea midline, NL thyroid no carotid bruit or JVD   Lungs:   CTAB, respirations unlabored   Chest Wall:  No tenderness or deformity   Heart:  Regular rhythm and normal rate; S1, S2 are normal;   no murmur noted; no rub or gallop   Abdomen:   Soft, non-tender, +BS x 4, no masses, no organomegaly   Extremities: Extremities normal, atraumatic, no cyanosis or edema   Pulses: 2+ and symmetric   Skin: Skin color, texture, turgor normal, no rashes or lesions   Pysch: Normal mood and affect   Neurologic: Normal gross motor and sensory exam.         LABS   CBC:      Lab Results   Component Value Date    WBC 11.2 06/26/2020    RBC 4.73 06/26/2020    HGB 12.9 06/26/2020    HCT 39.6 06/26/2020    MCV 83.8 06/26/2020    RDW 14.0 06/26/2020     06/26/2020     CMP:  Lab Results   Component Value Date     06/26/2020    K 3.8 06/26/2020    K 4.0 04/03/2019     06/26/2020    CO2 26 06/26/2020    BUN 19 06/26/2020    CREATININE 1.4 06/26/2020    GFRAA >60 06/26/2020    GFRAA >60 10/27/2011    AGRATIO 1.3 06/26/2020    LABGLOM 55 06/26/2020    GLUCOSE 139 06/26/2020    PROT 8.1 06/26/2020    PROT 8.6 10/27/2011    CALCIUM 9.5 06/26/2020 BILITOT 0.3 2020    ALKPHOS 91 2020    AST 14 2020    ALT 10 2020       Lab Results   Component Value Date    ALT 10 2020    AST 14 (L) 2020    ALKPHOS 91 2020    BILITOT 0.3 2020     Lab Results   Component Value Date    LABA1C 5.2 2018     Lipids:         Lab Results   Component Value Date    TRIG 194 (H) 2018    TRIG 106 2018            Lab Results   Component Value Date    HDL 32 (L) 2020    HDL 45 2019    HDL 49 2018            Lab Results   Component Value Date    LDLCALC 97 2020    LDLCALC 174 (H) 2019    LDLCALC 62 2018            Lab Results   Component Value Date    LABVLDL 45 2020    LABVLDL 43 2019    LABVLDL 39 2018         CARDIAC DATA   EK2018     Final 2018  8:17 PM 14   Sinus bradycardiaOtherwise normal ECGWhen compared with ECG of 30-DEC-2018 16:58         ECHO/MUGA: 19     Normal left ventricular systolic function with an estimated ejection   fraction of 55-60%. No regional wall motion abnormalities are seen. Normal left ventricular diastolic filling pressure. The right atrium is mildly dilated. Mild mitral regurgitation. Mild tricuspid regurgitation. Normal systolic pulmonary artery pressure (SPAP) estimated at 18 mmHg (RA   pressure 3 mmHg). STRESS TEST:    CARDIAC CATH 2020  LEFT HEART CATH  LM: luminals  LAD: long stented section from prox-mid with mild restenosis <25%. Just past stent in mid/distal with 40-50%  LCX: luminals  RCA: dominant, mild diffuse disease in prox/mid up to 20%     LVEDP: 6  LVEF: 55%  Assessment  1. Patent LAD stents  2.  Nonobstructive CAD                - consider L-arginine or Ranexa         CARDIAC CATH: 18  LEFT HEART CATH  LM: no obvious angiographic CAD  LAD: high mid LAD with 60% stenosis and + thrombus, mid LAd with 75% stenosis and calcium just past bifurcation of Diag 2  Ramus/OM1-  no obvious angiographic CAD  LCX: no obvious angiographic CAD  RCA: dominant, large vessel, downward takeoff, mild diffuse dz in prox and mid up to 30%  LVEDP: 9  LVEF: 40% with mid HK of mid/apical LAd, apex  LESION 1: PCI of  High mid LAD + thrombus  Stent: Xience hansa 3.5x28mm  LESION 2: mid LAD at bifurcation of Diag  OCT guided, + calcifum  STENT: Xience Hansa 2.25 x 33mm, post dilated distally to 2,5mm and prox up to 2.5 mm in overlapping fashion  Assessment  1. Successful PCI to high mid LAD for plaque rupture using 1 drug stent then PCI to lesion 2 in mid LAD for stenotic lesion threatening outflow. JANIYA-3 flow, 0% residual stenosis  2. ASA 81mg po qday for life  3. Ticagrelor 90mg po BID for 1 yr w/o interruption  4. BB, statin as tolerated  5. Integrillin for 12 hrs  6. Echo, cardiac rehab  7. Tobacco cessation   Assessment for cardiac risk factors     VASCULAR/OTHER IMAGING:      Assessment and Plan   Agueda Spears is a 39 y.o. male who presents today for the following problems:      1. CAD: NSTEMI, trops peaked at 0.43              - 12/30/2018 PCi to mid LAD using 2 SILVANA for plaque rupture  2. Ischemic cardiomyopathy: LVEF 40%--> 55%   - full recovery  3. Tobacco abuse: education to quit  4. Hx of EToH Abuse/Cocaine use  5. Hypovitamin D: repleted         MD PLAN  1. Recent cardiac cath showed stable stents and no obstructive CAD. 2.  Trial of L-arginine did not help with the patient's shortness of breath  3.  Agree with PCP having patient evaluated by pulmonology given tobacco history  4. We will discontinue Brilinta and change to Plavix and 5 mg daily as Brilinta can cause shortness of breath.   -If dyspnea continues greater than 1 week he should continue to follow pulmonology   -If dyspnea resolves he should inform our office and can likely cancel pulmonology  5. No further cardiac work-up at this time  6. Continue aspirin, pravastatin, Coreg.           Patient Active Problem List Diagnosis    Other forms of angina pectoris ([facility] [address]    NSTEMI s/p PCI and stenting of two LAD lesions    Tobacco smoker    Coronary artery disease    Anxiety and depression    Alcohol use vs abuse    Cocaine use    Mixed hyperlipidemia    SOB (shortness of breath)    Fatigue       Patient Plan:  1. Continue to try to schedule with pulmonology  2. May stop the L-Arginine   Continue all other medications  3. Follow up in 1 year or sooner if needed       It is a pleasure to assist in the care of [address] [place] [address]. Please call with any questions. This note was scribed in the presence of Betty Quiros MD by Maite Garcia RN.      Anup Briones MD, personally performed the services described in this documentation as scribed by the above signed scribe in my presence, and it is both accurate and complete to the best of our ability and knowledge. I agree with the details independently gathered by my clinical support staff, while the remaining scribed note accurately describes my personal service to the patient. The above RN is working as a scribe for and in the presence of myself . Working as a scribe, the RN may have prepopulated components of this note with my historical intellectual property under my direct supervision. Any additions to this intellectual property were performed at my direction. Furthermore, the content and accuracy of this note have been reviewed by me to the best of my ability.            Carlo Garcia MD, [address] [facility] Cardiologist  [address]  [phone] [facility]  [phone] [address] [place] [address]  11/18/2020  11:17 AM

## 2020-11-18 ENCOUNTER — OFFICE VISIT (OUTPATIENT)
Dept: CARDIOLOGY CLINIC | Age: 45
End: 2020-11-18
Payer: COMMERCIAL

## 2020-11-18 VITALS
OXYGEN SATURATION: 98 % | BODY MASS INDEX: 24.34 KG/M2 | HEIGHT: 70 IN | SYSTOLIC BLOOD PRESSURE: 120 MMHG | WEIGHT: 170 LBS | DIASTOLIC BLOOD PRESSURE: 72 MMHG | HEART RATE: 55 BPM

## 2020-11-18 PROCEDURE — 99213 OFFICE O/P EST LOW 20 MIN: CPT | Performed by: INTERNAL MEDICINE

## 2020-11-18 PROCEDURE — 4004F PT TOBACCO SCREEN RCVD TLK: CPT | Performed by: INTERNAL MEDICINE

## 2020-11-18 PROCEDURE — G8484 FLU IMMUNIZE NO ADMIN: HCPCS | Performed by: INTERNAL MEDICINE

## 2020-11-18 PROCEDURE — G8427 DOCREV CUR MEDS BY ELIG CLIN: HCPCS | Performed by: INTERNAL MEDICINE

## 2020-11-18 PROCEDURE — G8420 CALC BMI NORM PARAMETERS: HCPCS | Performed by: INTERNAL MEDICINE

## 2020-11-18 RX ORDER — CLOPIDOGREL BISULFATE 75 MG/1
75 TABLET ORAL DAILY
Qty: 90 TABLET | Refills: 1 | Status: SHIPPED | OUTPATIENT
Start: 2020-11-18 | End: 2021-05-11

## 2020-11-18 NOTE — PATIENT INSTRUCTIONS
Patient Plan:  1. Continue to try to schedule with pulmonology  2. May stop the L-Arginine   Continue all other medications  3.  Follow up in 1 year or sooner if needed

## 2020-11-22 ENCOUNTER — HOSPITAL ENCOUNTER (EMERGENCY)
Age: 45
Discharge: HOME | End: 2020-11-22
Attending: EMERGENCY MEDICINE
Payer: COMMERCIAL

## 2020-11-22 ENCOUNTER — APPOINTMENT (OUTPATIENT)
Dept: GENERAL RADIOLOGY | Age: 45
End: 2020-11-22
Payer: COMMERCIAL

## 2020-11-22 VITALS
TEMPERATURE: 98.4 F | HEART RATE: 72 BPM | DIASTOLIC BLOOD PRESSURE: 90 MMHG | HEIGHT: 70 IN | WEIGHT: 170 LBS | BODY MASS INDEX: 24.34 KG/M2 | SYSTOLIC BLOOD PRESSURE: 141 MMHG | OXYGEN SATURATION: 100 %

## 2020-11-22 PROCEDURE — 73630 X-RAY EXAM OF FOOT: CPT

## 2020-11-22 PROCEDURE — 99283 EMERGENCY DEPT VISIT LOW MDM: CPT

## 2020-11-22 RX ORDER — PREDNISONE 10 MG/1
TABLET ORAL
Qty: 20 TABLET | Refills: 0 | Status: SHIPPED | OUTPATIENT
Start: 2020-11-22 | End: 2020-12-02

## 2020-11-22 ASSESSMENT — PAIN DESCRIPTION - ORIENTATION: ORIENTATION: RIGHT

## 2020-11-22 ASSESSMENT — PAIN SCALES - GENERAL: PAINLEVEL_OUTOF10: 8

## 2020-11-22 ASSESSMENT — PAIN DESCRIPTION - PAIN TYPE: TYPE: ACUTE PAIN

## 2020-11-22 ASSESSMENT — PAIN DESCRIPTION - LOCATION: LOCATION: FOOT

## 2020-11-26 NOTE — ED PROVIDER NOTES
[address] [facility] COMPLAINT  Foot Pain (right foot pain since yesterday. no known injury. hx of gout. reports takes indocin daily. +redness and edema. nothing taken for pain pta)       HISTORY OF PRESENT ILLNESS  Virginia Ro is a 39 y.o. male who presents to the ED with complaint of right foot pain. History of gout. No recent injury to the right foot. Takes indomethacin. Pain is located around the metacarpals at the dorsal and plantar aspect of the foot, dull, worse with walking, better with rest, no radiation, moderate, no associated symptoms. No other complaints, modifying factors or associated symptoms.      I have reviewed the following from the nursing documentation:    Past Medical History:   Diagnosis Date    Anxiety     Depression     [facility]-ST [facility] 12/30/2018    Positive urine drug screen 12/2018    opiate, cocaine, benzos    Reflux     Traumatic rupture of spleen     Wears glasses      Past Surgical History:   Procedure Laterality Date    CORONARY ANGIOPLASTY WITH STENT PLACEMENT      2019    HERNIA REPAIR      KNEE SURGERY Left     ACL    PTCA  12/30/2018    ROTATOR CUFF REPAIR Right      Family History   Problem Relation Age of Onset    Diabetes Mother     Heart Disease Mother     Cancer Sister     Heart Disease Father     Diabetes Brother     Prostate Cancer Maternal Uncle     Stomach Cancer Maternal Grandmother     Cancer Maternal Grandfather      Social History     Socioeconomic History    Marital status:      Spouse name: Not on file    Number of children: Not on file    Years of education: Not on file    Highest education level: Not on file   Occupational History    Not on file   Social Needs    Financial resource strain: Not on file    Food insecurity     Worry: Not on file     Inability: Not on file    Transportation needs     Medical: Not on file     Non-medical: Not on file   Tobacco Use    Smoking status: Current Every Day Smoker     Packs/day: 0.50     Years: 18.00     Pack years: 9.00     Types: Cigarettes    Smokeless tobacco: Never Used    Tobacco comment: slowing down since the MI   Substance and Sexual Activity    Alcohol use: Yes     Alcohol/week: 3.0 standard drinks     Types: 3 Cans of beer per week     Comment: socially    Drug use: No    Sexual activity: Yes     Partners: Female   Lifestyle    Physical activity     Days per week: Not on file     Minutes per session: Not on file    Stress: Not on file   Relationships    Social connections     Talks on phone: Not on file     Gets together: Not on file     Attends [employer] service: Not on file     Active member of club or organization: Not on file     Attends meetings of clubs or organizations: Not on file     Relationship status: Not on file    Intimate partner violence     Fear of current or ex partner: Not on file     Emotionally abused: Not on file     Physically abused: Not on file     Forced sexual activity: Not on file   Other Topics Concern    Not on file   Social History Narrative    ** Merged History Encounter **          No current facility-administered medications for this encounter.       Current Outpatient Medications   Medication Sig Dispense Refill    predniSONE (DELTASONE) 10 MG tablet Take 4 tablets by mouth once daily for 5 days 20 tablet 0    clopidogrel (PLAVIX) 75 MG tablet Take 1 tablet by mouth daily 90 tablet 1    pravastatin (PRAVACHOL) 20 MG tablet TAKE (1) TABLET DAILY 30 tablet 5    carvedilol (COREG) 3.125 MG tablet TAKE (1) TABLET TWICE A DAY WITH MEALS 180 tablet 1    busPIRone (BUSPAR) 10 MG tablet       indomethacin (INDOCIN) 50 MG capsule Take 1 capsule by mouth 2 times daily (with meals) 20 capsule 0    ARIPiprazole (ABILIFY) 5 MG tablet TAKE (1) TABLET DAILY 30 tablet 0    aspirin 81 MG chewable tablet Take 1 tablet by mouth daily 30 tablet 0     Allergies   Allergen Reactions    Codeine Nausea Only    Codeine Nausea Only       REVIEW OF SYSTEMS  10 systems reviewed, pertinent positives and negatives per HPI, otherwise noted to be negative. PHYSICAL EXAM  ED Triage Vitals [11/22/20 7757]   Enc Vitals Group      BP (!) 141/90      Pulse 72      Resp       Temp 98.4 °F (36.9 °C)      Temp src       SpO2 100 %      Weight 170 lb (77.1 kg)      Height 5' 10\" (1.778 m)      Head Circumference       Peak Flow       Pain Score       Pain Loc       Pain Edu? Excl. in [address]? General appearance: Awake and alert. Cooperative. No acute distress. HENT: Normocephalic. Atraumatic. Mucous membranes are moist.  Eyes: PERRL. EOMI. Heart/Chest: RRR. Lungs: Respirations unlabored. CTAB. Abdomen: Soft. Non-tender. Non-distended. No rebound or guarding. Musculoskeletal: The right foot has no significant edema, no erythema, no gross deformity. 2+ dorsalis pedis and posterior tibialis pulses. Wiggles toes. Sensation intact to light touch. Skin: Warm and dry. No acute rashes. Neurological: Alert and oriented. CN II-XII intact. Psychiatric: Mood/affect: normal      LABS  I have reviewed all labs for this visit. No results found for this visit on 11/22/20. RADIOLOGY  I have reviewed all radiographic studies for this visit. XR FOOT RIGHT (MIN 3 VIEWS)   Final Result   No acute abnormality or significant arthropathic features identified. ED COURSE/MDM  Patient seen and evaluated. Old records reviewed. Labs and imaging reviewed and results discussed with patient/family to extent possible. Patient presents with right foot pain similar in character to previous gout flares. No evidence of cellulitis. No history of trauma. X-ray negative for acute bony abnormality. Presentation favors gout flare. Patient already on indomethacin. Will trial oral steroids. Will advise close follow-up with podiatry on an outpatient basis.   Usual strict return precautions for new or worsening symptoms communicated. All questions were answered and the patient/family expressed understanding and agreement with the plan. PROCEDURES  None    CRITICAL CARE  N/A    CLINICAL IMPRESSION  1. Right foot pain        DISPOSITION   discharge     Aramis Mohamud MD    Note: This chart was created using voice recognition dictation software. Efforts were made by me to ensure accuracy, however some errors may be present due to limitations of this technology and occasionally words are not transcribed correctly.         Aramis Mohamud MD  11/26/20 6034

## 2020-12-11 ENCOUNTER — TELEPHONE (OUTPATIENT)
Dept: PULMONOLOGY | Age: 45
End: 2020-12-11

## 2020-12-11 NOTE — TELEPHONE ENCOUNTER
Patient did not show for Dyspnea appointment  with Dr. Felipa Hallman on 12/11/20    Same Day Cancellation: Yes    Patient rescheduled:  No    Pt stated he was up on a lift working and would have to call back to reschedule later    Patient was also no show on: 8/7/2019    LOV 12/31/2018  IP

## 2021-04-28 ENCOUNTER — TELEPHONE (OUTPATIENT)
Dept: CARDIOLOGY CLINIC | Age: 46
End: 2021-04-28

## 2021-04-28 NOTE — TELEPHONE ENCOUNTER
Pt 's wife calling to let Anders Wilbert pt has been referred to pulmonary. Pt is also on C-pap machine. Wife also wants to know if Anders Wilbert wants to see pt. Please advise.

## 2021-05-11 ENCOUNTER — APPOINTMENT (OUTPATIENT)
Dept: GENERAL RADIOLOGY | Age: 46
End: 2021-05-11
Payer: COMMERCIAL

## 2021-05-11 ENCOUNTER — HOSPITAL ENCOUNTER (EMERGENCY)
Age: 46
Discharge: HOME | End: 2021-05-11
Attending: EMERGENCY MEDICINE
Payer: COMMERCIAL

## 2021-05-11 VITALS
TEMPERATURE: 98.1 F | RESPIRATION RATE: 16 BRPM | DIASTOLIC BLOOD PRESSURE: 67 MMHG | BODY MASS INDEX: 24.34 KG/M2 | HEIGHT: 70 IN | SYSTOLIC BLOOD PRESSURE: 101 MMHG | HEART RATE: 52 BPM | WEIGHT: 170 LBS | OXYGEN SATURATION: 100 %

## 2021-05-11 DIAGNOSIS — E86.0 DEHYDRATION: ICD-10-CM

## 2021-05-11 DIAGNOSIS — N17.9 AKI (ACUTE KIDNEY INJURY) (HCC): ICD-10-CM

## 2021-05-11 DIAGNOSIS — R42 DIZZINESS: Primary | ICD-10-CM

## 2021-05-11 LAB
A/G RATIO: 1.5 (ref 1.1–2.2)
ALBUMIN SERPL-MCNC: 4.5 G/DL (ref 3.4–5)
ALP BLD-CCNC: 93 U/L (ref 40–129)
ALT SERPL-CCNC: 13 U/L (ref 10–40)
ANION GAP SERPL CALCULATED.3IONS-SCNC: 10 MMOL/L (ref 3–16)
AST SERPL-CCNC: 20 U/L (ref 15–37)
BASOPHILS ABSOLUTE: 0.1 K/UL (ref 0–0.2)
BASOPHILS RELATIVE PERCENT: 0.6 %
BILIRUB SERPL-MCNC: <0.2 MG/DL (ref 0–1)
BUN BLDV-MCNC: 26 MG/DL (ref 7–20)
CALCIUM SERPL-MCNC: 9.7 MG/DL (ref 8.3–10.6)
CHLORIDE BLD-SCNC: 103 MMOL/L (ref 99–110)
CO2: 27 MMOL/L (ref 21–32)
CREAT SERPL-MCNC: 1.7 MG/DL (ref 0.9–1.3)
EKG ATRIAL RATE: 56 BPM
EKG DIAGNOSIS: NORMAL
EKG P AXIS: 49 DEGREES
EKG P-R INTERVAL: 180 MS
EKG Q-T INTERVAL: 446 MS
EKG QRS DURATION: 86 MS
EKG QTC CALCULATION (BAZETT): 430 MS
EKG R AXIS: 22 DEGREES
EKG T AXIS: 57 DEGREES
EKG VENTRICULAR RATE: 56 BPM
EOSINOPHILS ABSOLUTE: 0.2 K/UL (ref 0–0.6)
EOSINOPHILS RELATIVE PERCENT: 2.5 %
GFR AFRICAN AMERICAN: 53
GFR NON-AFRICAN AMERICAN: 44
GLOBULIN: 3.1 G/DL
GLUCOSE BLD-MCNC: 107 MG/DL (ref 70–99)
HCT VFR BLD CALC: 39.8 % (ref 40.5–52.5)
HEMOGLOBIN: 12.9 G/DL (ref 13.5–17.5)
LYMPHOCYTES ABSOLUTE: 3.1 K/UL (ref 1–5.1)
LYMPHOCYTES RELATIVE PERCENT: 35.8 %
MCH RBC QN AUTO: 27 PG (ref 26–34)
MCHC RBC AUTO-ENTMCNC: 32.5 G/DL (ref 31–36)
MCV RBC AUTO: 83.1 FL (ref 80–100)
MONOCYTES ABSOLUTE: 0.8 K/UL (ref 0–1.3)
MONOCYTES RELATIVE PERCENT: 8.8 %
NEUTROPHILS ABSOLUTE: 4.5 K/UL (ref 1.7–7.7)
NEUTROPHILS RELATIVE PERCENT: 52.3 %
PDW BLD-RTO: 14.7 % (ref 12.4–15.4)
PLATELET # BLD: 251 K/UL (ref 135–450)
PMV BLD AUTO: 8.6 FL (ref 5–10.5)
POTASSIUM REFLEX MAGNESIUM: 4.1 MMOL/L (ref 3.5–5.1)
RBC # BLD: 4.79 M/UL (ref 4.2–5.9)
SODIUM BLD-SCNC: 140 MMOL/L (ref 136–145)
TOTAL PROTEIN: 7.6 G/DL (ref 6.4–8.2)
TROPONIN: <0.01 NG/ML
WBC # BLD: 8.5 K/UL (ref 4–11)

## 2021-05-11 PROCEDURE — 96360 HYDRATION IV INFUSION INIT: CPT

## 2021-05-11 PROCEDURE — 2580000003 HC RX 258: Performed by: EMERGENCY MEDICINE

## 2021-05-11 PROCEDURE — 71045 X-RAY EXAM CHEST 1 VIEW: CPT

## 2021-05-11 PROCEDURE — 85025 COMPLETE CBC W/AUTO DIFF WBC: CPT

## 2021-05-11 PROCEDURE — 93005 ELECTROCARDIOGRAM TRACING: CPT | Performed by: EMERGENCY MEDICINE

## 2021-05-11 PROCEDURE — 84484 ASSAY OF TROPONIN QUANT: CPT

## 2021-05-11 PROCEDURE — 99284 EMERGENCY DEPT VISIT MOD MDM: CPT

## 2021-05-11 PROCEDURE — 93010 ELECTROCARDIOGRAM REPORT: CPT | Performed by: INTERNAL MEDICINE

## 2021-05-11 PROCEDURE — 96361 HYDRATE IV INFUSION ADD-ON: CPT

## 2021-05-11 PROCEDURE — 36415 COLL VENOUS BLD VENIPUNCTURE: CPT

## 2021-05-11 PROCEDURE — 80053 COMPREHEN METABOLIC PANEL: CPT

## 2021-05-11 RX ORDER — GABAPENTIN 300 MG/1
CAPSULE ORAL
COMMUNITY
Start: 2021-04-26 | End: 2021-06-29

## 2021-05-11 RX ORDER — BUSPIRONE HYDROCHLORIDE 10 MG/1
10 TABLET ORAL 3 TIMES DAILY
COMMUNITY

## 2021-05-11 RX ORDER — CLOTRIMAZOLE 1 %
CREAM (GRAM) TOPICAL
COMMUNITY
Start: 2021-04-26 | End: 2022-04-29

## 2021-05-11 RX ORDER — 0.9 % SODIUM CHLORIDE 0.9 %
1000 INTRAVENOUS SOLUTION INTRAVENOUS ONCE
Status: COMPLETED | OUTPATIENT
Start: 2021-05-11 | End: 2021-05-11

## 2021-05-11 RX ADMIN — SODIUM CHLORIDE 1000 ML: 9 INJECTION, SOLUTION INTRAVENOUS at 13:53

## 2021-05-11 NOTE — LETTER
[facility] Emergency Department  [address] [address]  Phone: [phone]               May 11, 2021    Patient: Lieutenant Serrato   YOB: 1975   Date of Visit: 5/11/2021       To Whom It May Concern:    Nenita Bean was seen and treated in our emergency department on 5/11/2021. He may return to work on 5/12/21.       Sincerely,       Sloan Fischer MD         Signature:__________________________________

## 2021-05-11 NOTE — ED NOTES
Reviewed patient discharge instructions at this time, copy given to patient. No questions or concerns. Patient voiced understanding.         Maggie Kasper RN  05/11/21 9571

## 2021-05-11 NOTE — ED PROVIDER NOTES
MT. [facility] EMERGENCY DEPARTMENT      CHIEF COMPLAINT  Dizziness (has been having lightheadedness and dizziness for a few days. states he has been struggling for months with SOB and has an appt today with lung specialist for evaluation. )       Heaven Alegria 38 is a 39 y.o. male  who presents to the ED complaining of lightheadedness and dizziness for a few days. Worse with exertion, standing up. Better when just sitting here. Feels a \"head rush. \"  Feels like he might pass out but has not had LOC. No chest pain. Has had dyspnea ongoing for a while, and was supposed to see a pulmonologist today, Dr. Luis Angel Adame in [place] later today. No cough, fever. No leg pain or swelling.  + smoker. No numbness, tingling or weakness other than stable neuropathy in feet. Drinks two bottles of tea a day, but nothing else. Only eats one meal/day. Not thirsty or hungry to intake more. No vomiting or diarrhea. Recently started on  Gabapentin, but had been on that before as well, trillitex. .      No other complaints, modifying factors or associated symptoms. I have reviewed the following from the nursing documentation.     Past Medical History:   Diagnosis Date    Anxiety     Depression     NSTEMI (non-ST elevated myocardial infarction) ([facility] [address] 12/30/2018    Positive urine drug screen 12/2018    opiate, cocaine, benzos    Reflux     Traumatic rupture of spleen     Wears glasses      Past Surgical History:   Procedure Laterality Date    CORONARY ANGIOPLASTY WITH STENT PLACEMENT      2019    HERNIA REPAIR      KNEE SURGERY Left     ACL    PTCA  12/30/2018    ROTATOR CUFF REPAIR Right      Family History   Problem Relation Age of Onset    Diabetes Mother     Heart Disease Mother     Cancer Sister     Heart Disease Father     Diabetes Brother     Prostate Cancer Maternal Uncle     Stomach Cancer Maternal Grandmother     Cancer Maternal Grandfather      Social History Socioeconomic History    Marital status:      Spouse name: Not on file    Number of children: Not on file    Years of education: Not on file    Highest education level: Not on file   Occupational History    Not on file   Social Needs    Financial resource strain: Not on file    Food insecurity     Worry: Not on file     Inability: Not on file    Transportation needs     Medical: Not on file     Non-medical: Not on file   Tobacco Use    Smoking status: Current Every Day Smoker     Packs/day: 1.00     Years: 18.00     Pack years: 18.00     Types: Cigarettes    Smokeless tobacco: Never Used   Substance and Sexual Activity    Alcohol use: Yes     Alcohol/week: 3.0 standard drinks     Types: 3 Cans of beer per week     Comment: socially    Drug use: Yes     Types: Marijuana    Sexual activity: Yes     Partners: Female   Lifestyle    Physical activity     Days per week: Not on file     Minutes per session: Not on file    Stress: Not on file   Relationships    Social connections     Talks on phone: Not on file     Gets together: Not on file     Attends [employer] service: Not on file     Active member of club or organization: Not on file     Attends meetings of clubs or organizations: Not on file     Relationship status: Not on file    Intimate partner violence     Fear of current or ex partner: Not on file     Emotionally abused: Not on file     Physically abused: Not on file     Forced sexual activity: Not on file   Other Topics Concern    Not on file   Social History Narrative    ** Merged History Encounter **          No current facility-administered medications for this encounter.       Current Outpatient Medications   Medication Sig Dispense Refill    busPIRone (BUSPAR) 10 MG tablet Take 10 mg by mouth 3 times daily      carvedilol (COREG) 3.125 MG tablet TAKE (1) TABLET TWICE A DAY WITH MEALS 180 tablet 1    indomethacin (INDOCIN) 50 MG capsule Take 1 capsule by mouth 2 times daily - 10.5 fL    Neutrophils % 52.3 %    Lymphocytes % 35.8 %    Monocytes % 8.8 %    Eosinophils % 2.5 %    Basophils % 0.6 %    Neutrophils Absolute 4.5 1.7 - 7.7 K/uL    Lymphocytes Absolute 3.1 1.0 - 5.1 K/uL    Monocytes Absolute 0.8 0.0 - 1.3 K/uL    Eosinophils Absolute 0.2 0.0 - 0.6 K/uL    Basophils Absolute 0.1 0.0 - 0.2 K/uL   Comprehensive Metabolic Panel w/ Reflex to MG   Result Value Ref Range    Sodium 140 136 - 145 mmol/L    Potassium reflex Magnesium 4.1 3.5 - 5.1 mmol/L    Chloride 103 99 - 110 mmol/L    CO2 27 21 - 32 mmol/L    Anion Gap 10 3 - 16    Glucose 107 (H) 70 - 99 mg/dL    BUN 26 (H) 7 - 20 mg/dL    CREATININE 1.7 (H) 0.9 - 1.3 mg/dL    GFR Non- 44 (A) >60    GFR  53 (A) >60    Calcium 9.7 8.3 - 10.6 mg/dL    Total Protein 7.6 6.4 - 8.2 g/dL    Albumin 4.5 3.4 - 5.0 g/dL    Albumin/Globulin Ratio 1.5 1.1 - 2.2    Total Bilirubin <0.2 0.0 - 1.0 mg/dL    Alkaline Phosphatase 93 40 - 129 U/L    ALT 13 10 - 40 U/L    AST 20 15 - 37 U/L    Globulin 3.1 g/dL   Troponin   Result Value Ref Range    Troponin <0.01 <0.01 ng/mL   EKG 12 Lead   Result Value Ref Range    Ventricular Rate 56 BPM    Atrial Rate 56 BPM    P-R Interval 180 ms    QRS Duration 86 ms    Q-T Interval 446 ms    QTc Calculation (Bazett) 430 ms    P Axis 49 degrees    R Axis 22 degrees    T Axis 57 degrees    Diagnosis       Sinus bradycardiaNonspecific ST abnormalityConfirmed by STEFANIE ROMANO MD ([id number]) on 5/11/2021 3:09:58 PM       ECG  The Ekg interpreted by me shows  sinus bradycardia, rate=56   Axis is   Normal  QTc is  normal  Intervals and Durations are unremarkable.       ST Segments: no acute change  No significant change from prior EKG dated 6/26/20    RADIOLOGY  Xr Chest Portable    Result Date: 5/11/2021  EXAMINATION: ONE XRAY VIEW OF THE CHEST 5/11/2021 1:45 pm COMPARISON: 09/17/2019 HISTORY: ORDERING SYSTEM PROVIDED HISTORY: dizziness TECHNOLOGIST PROVIDED HISTORY: Reason for exam:->dizziness Reason for Exam: lightheaded, dizziness, sob Acuity: Acute Type of Exam: Initial FINDINGS: The heart and pulmonary vascularity are within normal limits. There are no focal areas of consolidation or pleural effusion. The osseous structures are intact. Unremarkable portable exam       ED COURSE/MDM  Patient seen and evaluated. Old records reviewed. Labs and imaging reviewed and results discussed with patient. Patient presenting for evaluation of lightheadedness when he stands. He appears to be likely dehydrated based on BUN and creatinine. Mild KJ with a creatinine of 1.7, up from 1.4 previously. Will have patient increase his fluid intake. He was given IV fluids while here and feels improved. Work-up otherwise unremarkable without any arrhythmia or significant findings on EKG. No chest pain at all. Troponin negative. No significant anemia. Will discharge with instructions to orally hydrate and follow-up closely as an outpatient for recheck of his kidney function to ensure that this is improving as expected with oral hydration, or at least not worsening. Patient verbalized understanding and agreement of that plan. Reasons to return to the ER were discussed and all questions answered at time of discharge. I estimate there is LOW risk for ACUTE CORONARY SYNDROME, INTRACRANIAL HEMORRHAGE, MALIGNANT DYSRHYTHMIA or HYPERTENSION, PULMONARY EMBOLISM, SEPSIS, SUBARACHNOID HEMORRHAGE, SUBDURAL HEMATOMA, STROKE, or THORACIC AORTIC DISSECTION, thus I consider the discharge disposition reasonable. [address] [address] and I have discussed the diagnosis and risks, and we agree with discharging home to follow-up with their primary doctor. We also discussed returning to the Emergency Department immediately if new or worsening symptoms occur.  We have discussed the symptoms which are most concerning (e.g., bloody sputum, fever, worsening pain or shortness of breath, vomiting, weakness) that necessitate immediate return. During the patient's ED course, the patient was given:  Medications   0.9 % sodium chloride bolus (0 mLs Intravenous Stopped 5/11/21 1530)        CLINICAL IMPRESSION  1. Dizziness    2. Dehydration    3. KJ (acute kidney injury) ([facility] [address]        Blood pressure 101/67, pulse 52, temperature 98.1 °F (36.7 °C), temperature source Oral, resp. rate 16, height 5' 10\" (1.778 m), weight 170 lb (77.1 kg), SpO2 100 %. [id number] was discharged to home in stable condition. Patient was given scripts for the following medications. I counseled patient how to take these medications. Discharge Medication List as of 5/11/2021  3:30 PM          Follow-up with:  Your primary care provider  Follow-up in the next week after recheck of your kidney function        [facility]) Pre-Services  [phone]  Schedule an appointment as soon as possible for a visit in 2 days  To establish care with a primary care physician if needed      DISCLAIMER: This chart was created using Dragon dictation software. Efforts were made by me to ensure accuracy, however some errors may be present due to limitations of this technology and occasionally words are not transcribed correctly.         Malcolm Vasquez MD  05/11/21 3914

## 2021-05-20 ENCOUNTER — TELEPHONE (OUTPATIENT)
Dept: CARDIOLOGY CLINIC | Age: 46
End: 2021-05-20

## 2021-05-20 NOTE — TELEPHONE ENCOUNTER
Pt wife called, pt is having his toenail removed and is needing JJP to ok patient being off meds for a week prior to the procedure. Not scheduled yet. Faxed over clearance letter.     Fax # [phone]  Alfredo Esparza

## 2021-05-27 ENCOUNTER — OFFICE VISIT (OUTPATIENT)
Dept: PULMONOLOGY | Age: 46
End: 2021-05-27
Payer: COMMERCIAL

## 2021-05-27 VITALS
HEART RATE: 55 BPM | HEIGHT: 71 IN | RESPIRATION RATE: 20 BRPM | WEIGHT: 171.3 LBS | SYSTOLIC BLOOD PRESSURE: 129 MMHG | BODY MASS INDEX: 23.98 KG/M2 | DIASTOLIC BLOOD PRESSURE: 73 MMHG | OXYGEN SATURATION: 97 % | TEMPERATURE: 98.3 F

## 2021-05-27 DIAGNOSIS — F12.90 MARIJUANA USE: ICD-10-CM

## 2021-05-27 DIAGNOSIS — J42 CHRONIC BRONCHITIS, UNSPECIFIED CHRONIC BRONCHITIS TYPE (HCC): ICD-10-CM

## 2021-05-27 DIAGNOSIS — Z72.0 TOBACCO ABUSE: Chronic | ICD-10-CM

## 2021-05-27 DIAGNOSIS — N17.9 AKI (ACUTE KIDNEY INJURY) (HCC): ICD-10-CM

## 2021-05-27 DIAGNOSIS — G47.33 OSA ON CPAP: ICD-10-CM

## 2021-05-27 DIAGNOSIS — Z99.89 OSA ON CPAP: ICD-10-CM

## 2021-05-27 DIAGNOSIS — R06.02 SOB (SHORTNESS OF BREATH): Primary | ICD-10-CM

## 2021-05-27 PROCEDURE — 99215 OFFICE O/P EST HI 40 MIN: CPT | Performed by: INTERNAL MEDICINE

## 2021-05-27 RX ORDER — ARIPIPRAZOLE 5 MG/1
10 TABLET ORAL DAILY
COMMUNITY

## 2021-05-27 RX ORDER — ALBUTEROL SULFATE 90 UG/1
2 AEROSOL, METERED RESPIRATORY (INHALATION) 4 TIMES DAILY PRN
Qty: 3 INHALER | Refills: 1 | Status: SHIPPED | OUTPATIENT
Start: 2021-05-27

## 2021-05-27 NOTE — PROGRESS NOTES
MA Communication:   The following orders are received by verbal communication from Gurwinder Hussein MD    Orders include:  PFT scheduled- 7/15/21 8:30 am      FU scheduled 7/15/21 at 9:40 am    PT will go to family doctor for COVID-19 screening

## 2021-05-27 NOTE — PROGRESS NOTES
C/O Shortness of breath      HPI: Patient is a 42-year-old male who was referred by  Barbara Guzman to the office for a pulmonary evaluation    Patient states that he feels tired and weak and patient's symptoms are progressive in nature, patient was recently in the ER where he was found to have dehydration with KJ and patient was given IV fluids along with that patient was told to take plenty of fluids, patient has not had any repeat blood work done so far, patient also says that he has significant shortness of breath and his exercise tolerance is quite limited, patient does have a rhinorrhea with postnasal drainage, patient does not have any epistaxis, patient does not have any difficulty in swallowing, no coughing or choking while eating, no odynophagia or dysphagia, patient has had some chills especially at nighttime, patient has occasional wheezing and chest tightness, patient on questioning further states that he does not have any palpitations, patient does have reflux symptoms; patient does not have any significant altered bowel habits, no hematochezia or melena, patient does not have any dysuria or hematuria but patient states that he does have the urge to micturate but is not able to pee too much, patient does not have any swelling of the lower extremities, patient does not drink any alcohol now, patient states that he still smokes but he was smoking 2-1/2 packs a day before but it has come down to 1 pack a day, patient also smokes marijuana, patient does last work, patient on questioning about snoring states that he was diagnosed as having JOHNATHAN and patient has been on CPAP for last few weeks time, patient does not have any change in them intermittent sick contacts, patient does not have any confusion lethargy, patient does not have any other pertinent review of system of concern            Review of Systems same as above     Physical Exam:  Blood pressure 129/73, pulse 55, temperature 98.3 °F (36.8 °C), temperature source Oral, resp. rate 20, height 5' 11\" (1.803 m), weight 171 lb 4.8 oz (77.7 kg), SpO2 97 %.'  Constitutional:  No acute distress. HENT:  Oropharynx is clear and moist. Nothyromegaly. Eyes:  Conjunctivae are normal. Pupils equal, round, and reactive to light. No scleral icterus. Neck: . No tracheal deviation present. No obviousthyroid mass. Cardiovascular: Sinus bradycardia normal heart sounds. No right ventricular heave. No lower extremity edema. Pulmonary/Chest: No wheezes. No rales. Chest wall is not dull to percussion. No accessory muscle usage or stridor. Decreased BSI  Abdominal: Soft. Bowel sounds present. No distension or hernia. No tenderness. Musculoskeletal : No cyanosis. No clubbing. No obvious joint deformity. Lymphadenopathy: No cervical or supraclavicularadenopathy. Skin: Skin is warm and dry. No rash or nodules on the exposed extremities. Psychiatric: Normal mood and affect. Behavior is normal.  No anxiety. Neurologic : Alert, awake and oriented. PERRL. Speechfluent          Data:     Imaging:  I have reviewed radiology images personally. No orders to display     XR CHEST PORTABLE    Result Date: 5/11/2021  EXAMINATION: ONE XRAY VIEW OF THE CHEST 5/11/2021 1:45 pm COMPARISON: 09/17/2019 HISTORY: ORDERING SYSTEM PROVIDED HISTORY: dizziness TECHNOLOGIST PROVIDED HISTORY: Reason for exam:->dizziness Reason for Exam: lightheaded, dizziness, sob Acuity: Acute Type of Exam: Initial FINDINGS: The heart and pulmonary vascularity are within normal limits. There are no focal areas of consolidation or pleural effusion. The osseous structures are intact. Unremarkable portable exam     Sleep study in November 2020-Respiratory Data:   TOTAL  AHI = 22/HR   Central AHI= 7/hour   Lowest O2 desaturation = 84 %   Time spent with O2 <90% = 16 minutes      Sodium 140  136 - 145 mmol/L Final 05/11/2021  1:10 PM  - [facility]   Potassium reflex Magnesium 4.1  3.5 - 5.1 mmol/L Final 05/11/2021  1:10 PM MH - [facility]   Chloride 103  99 - 110 mmol/L Final 05/11/2021  1:10 PM MH - [facility]   CO2 27  21 - 32 mmol/L Final 05/11/2021  1:10 PM MH - [facility]   Anion Gap 10  3 - 16 Final 05/11/2021  1:10 PM MH - [facility]   Glucose 107High   70 - 99 mg/dL Final 05/11/2021  1:10 PM MH - [facility]   BUN 26High   7 - 20 mg/dL Final 05/11/2021  1:10 PM MH - [facility]   CREATININE 1.7High   0.9 - 1.3 mg/dL Final 05/11/2021  1:10 PM MH - [facility]   GFR Non- 44Abnormal             Calcium 9.7  8.3 - 10.6 mg/dL Final 05/11/2021  1:10 PM MH - [facility]   Total Protein 7.6  6.4 - 8.2 g/dL Final 05/11/2021  1:10 PM MH - [facility]   Albumin 4.5  3.4 - 5.0 g/dL Final 05/11/2021  1:10 PM MH - [facility]   Albumin/Globulin Ratio 1.5  1.1 - 2.2 Final 05/11/2021  1:10 PM MH - [facility]   Total Bilirubin <0.2  0.0 - 1.0 mg/dL Final 05/11/2021  1:10 PM MH - [facility]   Alkaline Phosphatase 93  40 - 129 U/L Final 05/11/2021  1:10 PM MH - [facility]   ALT 13  10 - 40 U/L Final 05/11/2021  1:10 PM MH - [facility]   AST 20  15 - 37 U/L Final 05/11/2021  1:10 PM MH - [facility]   Globulin 3.1            Assessment:    1. SOB (shortness of breath)    - Full PFT Study With Bronchodilator; Future  - albuterol sulfate HFA (VENTOLIN HFA) 108 (90 Base) MCG/ACT inhaler; Inhale 2 puffs into the lungs 4 times daily as needed for Wheezing  Dispense: 3 Inhaler; Refill: 1    2. Chronic bronchitis, unspecified chronic bronchitis type ([address]    - Full PFT Study With Bronchodilator; Future  - albuterol sulfate HFA (VENTOLIN HFA) 108 (90 Base) MCG/ACT inhaler; Inhale 2 puffs into the lungs 4 times daily as needed for Wheezing  Dispense: 3 Inhaler; Refill: 1    3. Tobacco smoker    - Full PFT Study With Bronchodilator; Future  - albuterol sulfate HFA (VENTOLIN HFA) 108 (90 Base) MCG/ACT inhaler;  Inhale 2 puffs into the lungs 4 times daily as needed for Wheezing  Dispense: 3 Inhaler; Refill: 1    4. JOHNATHAN on CPAP      5. KJ (acute kidney injury) ([facility] [address]      6.  Marijuana use          Plan:   · Patient's review of system were discussed  · Patient and family were told about the clinical status including auscultation and implications  · Patient was shown the recent x-ray chest along with interpretation  · Patient's recent lab work was reviewed which shows patient to have KJ without any abnormal liver function  · Patient has been given IV fluids but repeat lab work for kidney function is pending  · Patient is also complaining of having urged to micturate but is not able to do so-patient's PCP to decide upon getting renal ultrasound and prostate evaluation if deemed appropriate  · Patient has been diagnosed as having JOHNATHAN and has been given CPAP which he has started only few weeks back and patient was told that his tiredness and fatigability may be secondary to JOHNATHAN and if he uses his CPAP on regular basis and as directed patient may feel better with that  · Patient was told about the pathophysiology of JOHNATHAN along with sequelae  · Patient was also told about the pathophysiology and sequelae of the disease process and its modifying factors  · Patient was told that it is imperative for him to stop smoking otherwise he will have increasing morbidity mortality and can be a respiratory cripple  · Patient can use some saline nasal spray over-the-counter for sinus congestion and postnasal drainage  · Clinically patient appears to have COPD which may be contributing to patient's symptomatology  · Patient was given a sample of Trelegy Ellipta and was told to take 1 puff daily and to rinse mouth with water after use otherwise he will have hoarseness of voice oral thrush  · Patient was given a prescription for albuterol inhaler was told to take 2 puffs on as needed basis and patient was taught how to take it properly and patient exhibits understanding  · Diet and lifestyle modifications discussed  · Patient needs a PFT to elucidate extent of COPD or any restrictive lung disease  · Diet and lifestyle modification discussed  · Patient's PCP to decide if patient will benefit from H2 blocker or PPI  · Patient's further treatment depending on patient clinical status and follow-up on above recommendations along with effect of the inhalers given and the PFT results

## 2021-05-27 NOTE — PATIENT INSTRUCTIONS
Remember to bring a list of pulmonary medications and any CPAP or BiPAP machines to your next appointment with the office. Please keep all of your future appointments scheduled by [address], [facility]. Out of respect for other patients and providers, you may be asked to reschedule your appointment if you arrive later than your scheduled appointment time. Appointments cancelled less than 24hrs in advance will be considered a no show. Patients with three missed appointments within 1 year or four missed appointments within 2 years can be dismissed from the practice. Please be aware that our physicians are required to work in the Intensive Care Unit at [facility].  Your appointment may need to be rescheduled if they are designated to work during your appointment time. You may receive a survey regarding the care you received during your visit. Your input is valuable to us. We encourage you to complete and return your survey. We hope you will choose us in the future for your healthcare needs. Pt instructed of all future appointment dates & times, including radiology, labs, procedures & referrals. If procedures were scheduled preparation instructions provided. Instructions on future appointments with [facility] were given.

## 2021-06-08 RX ORDER — CLOPIDOGREL BISULFATE 75 MG/1
75 TABLET ORAL DAILY
Qty: 90 TABLET | Refills: 3 | Status: SHIPPED | OUTPATIENT
Start: 2021-06-08 | End: 2022-07-26

## 2021-06-14 NOTE — TELEPHONE ENCOUNTER
Patient rescheduled for 06/29/2021. Patient is requesting a refill on trelegy. He only has 2 doses of his samples left. Order pended if appropriate.

## 2021-06-29 ENCOUNTER — HOSPITAL ENCOUNTER (OUTPATIENT)
Dept: PULMONOLOGY | Age: 46
Discharge: HOME | End: 2021-06-29
Payer: COMMERCIAL

## 2021-06-29 ENCOUNTER — OFFICE VISIT (OUTPATIENT)
Dept: PULMONOLOGY | Age: 46
End: 2021-06-29

## 2021-06-29 VITALS
OXYGEN SATURATION: 98 % | WEIGHT: 173.2 LBS | HEIGHT: 71 IN | HEART RATE: 53 BPM | SYSTOLIC BLOOD PRESSURE: 105 MMHG | DIASTOLIC BLOOD PRESSURE: 68 MMHG | BODY MASS INDEX: 24.25 KG/M2 | TEMPERATURE: 97.7 F

## 2021-06-29 DIAGNOSIS — R09.81 NASAL CONGESTION: ICD-10-CM

## 2021-06-29 DIAGNOSIS — Z72.0 TOBACCO ABUSE: Chronic | ICD-10-CM

## 2021-06-29 DIAGNOSIS — Z99.89 OSA ON CPAP: ICD-10-CM

## 2021-06-29 DIAGNOSIS — K21.9 GASTROESOPHAGEAL REFLUX DISEASE, UNSPECIFIED WHETHER ESOPHAGITIS PRESENT: ICD-10-CM

## 2021-06-29 DIAGNOSIS — J42 CHRONIC BRONCHITIS, UNSPECIFIED CHRONIC BRONCHITIS TYPE (HCC): ICD-10-CM

## 2021-06-29 DIAGNOSIS — R06.02 SOB (SHORTNESS OF BREATH): ICD-10-CM

## 2021-06-29 DIAGNOSIS — J41.0 SIMPLE CHRONIC BRONCHITIS (HCC): Primary | ICD-10-CM

## 2021-06-29 DIAGNOSIS — G47.33 OSA ON CPAP: ICD-10-CM

## 2021-06-29 DIAGNOSIS — J98.4 RESTRICTIVE LUNG DISEASE: ICD-10-CM

## 2021-06-29 LAB
DLCO %PRED: 61 %
DLCO PRED: NORMAL
DLCO/VA %PRED: NORMAL
DLCO/VA PRED: NORMAL
DLCO/VA: NORMAL
DLCO: NORMAL
EXPIRATORY TIME-POST: NORMAL
EXPIRATORY TIME: NORMAL
FEF 25-75% %CHNG: NORMAL
FEF 25-75% %PRED-POST: NORMAL
FEF 25-75% %PRED-PRE: NORMAL
FEF 25-75% PRED: NORMAL
FEF 25-75%-POST: NORMAL
FEF 25-75%-PRE: NORMAL
FEV1 %PRED-POST: 80 %
FEV1 %PRED-PRE: 71 %
FEV1 PRED: NORMAL
FEV1-POST: NORMAL
FEV1-PRE: NORMAL
FEV1/FVC %PRED-POST: NORMAL
FEV1/FVC %PRED-PRE: NORMAL
FEV1/FVC PRED: NORMAL
FEV1/FVC-POST: 82 %
FEV1/FVC-PRE: 73 %
FVC %PRED-POST: NORMAL
FVC %PRED-PRE: NORMAL
FVC PRED: NORMAL
FVC-POST: NORMAL
FVC-PRE: NORMAL
GAW %PRED: NORMAL
GAW PRED: NORMAL
GAW: NORMAL
IC %PRED: NORMAL
IC PRED: NORMAL
IC: NORMAL
MEP: NORMAL
MIP: NORMAL
MVV %PRED-PRE: NORMAL
MVV PRED: NORMAL
MVV-PRE: NORMAL
PEF %PRED-POST: NORMAL
PEF %PRED-PRE: NORMAL
PEF PRED: NORMAL
PEF%CHNG: NORMAL
PEF-POST: NORMAL
PEF-PRE: NORMAL
RAW %PRED: NORMAL
RAW PRED: NORMAL
RAW: NORMAL
RV %PRED: NORMAL
RV PRED: NORMAL
RV: NORMAL
SVC %PRED: NORMAL
SVC PRED: NORMAL
SVC: NORMAL
TLC %PRED: 70 %
TLC PRED: NORMAL
TLC: NORMAL
VA %PRED: NORMAL
VA PRED: NORMAL
VA: NORMAL
VTG %PRED: NORMAL
VTG PRED: NORMAL
VTG: NORMAL

## 2021-06-29 PROCEDURE — 99214 OFFICE O/P EST MOD 30 MIN: CPT | Performed by: INTERNAL MEDICINE

## 2021-06-29 PROCEDURE — 94729 DIFFUSING CAPACITY: CPT

## 2021-06-29 PROCEDURE — 6370000000 HC RX 637 (ALT 250 FOR IP): Performed by: INTERNAL MEDICINE

## 2021-06-29 PROCEDURE — 94760 N-INVAS EAR/PLS OXIMETRY 1: CPT

## 2021-06-29 PROCEDURE — 94060 EVALUATION OF WHEEZING: CPT

## 2021-06-29 PROCEDURE — 94726 PLETHYSMOGRAPHY LUNG VOLUMES: CPT

## 2021-06-29 RX ORDER — NYSTATIN 100000 [USP'U]/G
POWDER TOPICAL NIGHTLY
COMMUNITY
End: 2022-04-29

## 2021-06-29 RX ORDER — PANTOPRAZOLE SODIUM 40 MG/1
40 TABLET, DELAYED RELEASE ORAL
Qty: 30 TABLET | Refills: 2 | Status: SHIPPED | OUTPATIENT
Start: 2021-06-29

## 2021-06-29 RX ORDER — FLUTICASONE PROPIONATE 50 MCG
1 SPRAY, SUSPENSION (ML) NASAL DAILY
Qty: 2 BOTTLE | Refills: 1 | Status: SHIPPED | OUTPATIENT
Start: 2021-06-29 | End: 2022-04-29

## 2021-06-29 RX ORDER — ALBUTEROL SULFATE 90 UG/1
4 AEROSOL, METERED RESPIRATORY (INHALATION) ONCE
Status: COMPLETED | OUTPATIENT
Start: 2021-06-29 | End: 2021-06-29

## 2021-06-29 RX ADMIN — Medication 4 PUFF: at 12:39

## 2021-06-29 ASSESSMENT — PULMONARY FUNCTION TESTS
FEV1/FVC_POST: 82
FEV1_PERCENT_PREDICTED_POST: 80
FEV1/FVC_PRE: 73
FEV1_PERCENT_PREDICTED_PRE: 71

## 2021-06-29 NOTE — PROGRESS NOTES
drainage, patient does not have any epistaxis, patient does not have any difficulty in swallowing, no coughing or choking while eating, no odynophagia or dysphagia, patient has had some chills especially at nighttime, patient has occasional wheezing and chest tightness, patient on questioning further states that he does not have any palpitations, patient does have reflux symptoms; patient does not have any significant altered bowel habits, no hematochezia or melena, patient does not have any dysuria or hematuria but patient states that he does have the urge to micturate but is not able to pee too much, patient does not have any swelling of the lower extremities, patient does not drink any alcohol now, patient states that he still smokes but he was smoking 2-1/2 packs a day before but it has come down to 1 pack a day, patient also smokes marijuana, patient does last work, patient on questioning about snoring states that he was diagnosed as having JOHNATHAN and patient has been on CPAP for last few weeks time, patient does not have any change in them intermittent sick contacts, patient does not have any confusion lethargy, patient does not have any other pertinent review of system of concern            Review of Systems same as above     Physical Exam:  Blood pressure 105/68, pulse 53, temperature 97.7 °F (36.5 °C), temperature source Oral, height 5' 11\" (1.803 m), weight 173 lb 3.2 oz (78.6 kg), SpO2 98 %.'  Constitutional:  No acute distress. HENT:  Oropharynx is clear and moist. Nothyromegaly. Eyes:  Conjunctivae are normal. Pupils equal, round, and reactive to light. No scleral icterus. Neck: . No tracheal deviation present. No obviousthyroid mass. Cardiovascular: Sinus bradycardia normal heart sounds. No right ventricular heave. No lower extremity edema. Pulmonary/Chest: No wheezes. No rales. Chest wall is not dull to percussion. No accessory muscle usage or stridor. Decreased BSI  Abdominal: Soft.  Bowel sounds present. No distension or hernia. No tenderness. Musculoskeletal : No cyanosis. No clubbing. No obvious joint deformity. Lymphadenopathy: No cervical or supraclavicularadenopathy. Skin: Skin is warm and dry. No rash or nodules on the exposed extremities. Psychiatric: Normal mood and affect. Behavior is normal.  No anxiety. Neurologic : Alert, awake and oriented. PERRL. Speechfluent          Data:     Imaging:  I have reviewed radiology images personally. No orders to display     XR CHEST PORTABLE    Result Date: 5/11/2021  EXAMINATION: ONE XRAY VIEW OF THE CHEST 5/11/2021 1:45 pm COMPARISON: 09/17/2019 HISTORY: ORDERING SYSTEM PROVIDED HISTORY: dizziness TECHNOLOGIST PROVIDED HISTORY: Reason for exam:->dizziness Reason for Exam: lightheaded, dizziness, sob Acuity: Acute Type of Exam: Initial FINDINGS: The heart and pulmonary vascularity are within normal limits. There are no focal areas of consolidation or pleural effusion. The osseous structures are intact. Unremarkable portable exam     Sleep study in November 2020-Respiratory Data:   TOTAL  AHI = 22/HR   Central AHI= 7/hour   Lowest O2 desaturation = 84 %   Time spent with O2 <90% = 16 minutes            Assessment:    1. SOB (shortness of breath)     1    2. Chronic bronchitis, unspecified chronic bronchitis type ([address]        3. Tobacco smoker  to the lungs 4 times daily as needed for Wheezing  Dispense: 3 Inhaler; Refill: 1    4. JOHNATHAN on CPAP      5. GERD    6.  Restrictive lung disease           Plan:   · Patient's review of system were discussed  · Patient and family were told about the clinical status including auscultation and implications  · Patient and family were told about the PFT results is interpretation along with implications and options  · Patient has been diagnosed as having JOHNATHAN and has been given CPAP which he has started only few weeks back and patient was told that his tiredness and fatigability may be secondary to JOHNATHAN and if he uses his CPAP on regular basis and as directed patient may feel better with that  · Patient has not been using his CPAP machine lately and patient was told to discuss with his [employer] and his sleep doctor about options as noncompliance with CPAP may be increasing his morbidity mortality  · Patient was told about the pathophysiology of JOHNATHAN along with sequelae  · Patient was also told about the pathophysiology and sequelae of the disease process and its modifying factors  · Patient was told that it is imperative for him to stop smoking otherwise he will have increasing morbidity mortality and can be a respiratory cripple-discussed and reinforced once again  · Patient can use some saline nasal spray over-the-counter for sinus congestion and postnasal drainage  · Clinically patient appears to have COPD which may be contributing to patient's symptomatology  · Patient was given a prescription  of Trelegy Ellipta and was told to take 1 puff daily and to rinse mouth with water after use otherwise he will have hoarseness of voice oral thrush  · Patient was given a prescription for albuterol inhaler was told to take 2 puffs on as needed basis and patient was taught how to take it properly and patient exhibits understanding  · Patient was also given Flonase nasal spray 2 puff each nostril once a day  · Patient was given PPI in the form of Protonix for now patient needs to discuss with his PCP about continuation of the medication  · Diet and lifestyle modifications discussed  · Diet and lifestyle modification discussed  · Patient to refrain from use of recreational drugs  · Evaluation and management of his kidney issues and liver issues as per PCP/nephrology  · Patient to follow-up in 6 months time or earlier if required

## 2021-06-29 NOTE — PROGRESS NOTES
MA Communication:   The following orders are received by verbal communication from Heena Gallo MD    Orders include: 6 mo fu lungs

## 2021-06-30 NOTE — PROCEDURES
[address] [address]                               PULMONARY FUNCTION    PATIENT NAME: Corby Reyes                  :        1975  MED REC NO:   [id number]                            ROOM:  ACCOUNT NO:   [de-identified]                           ADMIT DATE: 2021  PROVIDER:     Alisha Tate MD    DATE OF PROCEDURE:  2021    PFT INTERPRETATION    The patient is a 71-year-old male with diagnosis of shortness of breath. Spirometry shows FVC to be 77%, FEV1 to be 71%, FEV1 to FVC ratio was  92%. The patient had significant postbronchodilator improvement. The  patient's lung volume shows the total lung capacity was mildly reduced  at 70%. The patient does not have any air trapping or hyperinflation. The patient's diffusion capacity when adjusted for volume was mildly  reduced. The patient's flow-volume loop was essentially normal on the  basis of this PFT, the patient has mild restrictive lung disease with  reactive airway disease. Please correlate clinically.         Shandra Andrews MD    D: 2021 20:07:01       T: 2021 20:11:53     [id number]  Job#: [id number]     Doc#: [id number]

## 2021-07-13 RX ORDER — CARVEDILOL 3.12 MG/1
TABLET ORAL
Qty: 180 TABLET | Refills: 3 | Status: SHIPPED | OUTPATIENT
Start: 2021-07-13 | End: 2022-10-31

## 2021-07-13 NOTE — TELEPHONE ENCOUNTER
Last OV: 11/18/20  Next OV: 11/19/21  Most recent Labs: 5/11/21  Last PT/INR (if needed):6/26/20  Last EKG (if needed):

## 2021-09-22 ENCOUNTER — TELEPHONE (OUTPATIENT)
Dept: PULMONOLOGY | Age: 46
End: 2021-09-22

## 2021-09-22 NOTE — TELEPHONE ENCOUNTER
Alves Ahr is denied by insurance they are recommending Symbucort, Spiriva HandiHaler, Flovent HFA, Spirivia Respimate, Dulera, Combivent Respimate, or Atrovent HFA. Please advise.

## 2021-09-23 RX ORDER — BUDESONIDE AND FORMOTEROL FUMARATE DIHYDRATE 160; 4.5 UG/1; UG/1
2 AEROSOL RESPIRATORY (INHALATION) 2 TIMES DAILY
Qty: 10.2 G | Refills: 3 | OUTPATIENT
Start: 2021-09-23

## 2022-04-29 ENCOUNTER — APPOINTMENT (OUTPATIENT)
Dept: GENERAL RADIOLOGY | Age: 47
End: 2022-04-29
Payer: COMMERCIAL

## 2022-04-29 ENCOUNTER — HOSPITAL ENCOUNTER (EMERGENCY)
Age: 47
Discharge: HOME | End: 2022-04-29
Attending: STUDENT IN AN ORGANIZED HEALTH CARE EDUCATION/TRAINING PROGRAM
Payer: COMMERCIAL

## 2022-04-29 ENCOUNTER — APPOINTMENT (OUTPATIENT)
Dept: CT IMAGING | Age: 47
End: 2022-04-29
Payer: COMMERCIAL

## 2022-04-29 VITALS
WEIGHT: 198 LBS | BODY MASS INDEX: 27.72 KG/M2 | SYSTOLIC BLOOD PRESSURE: 140 MMHG | HEIGHT: 71 IN | DIASTOLIC BLOOD PRESSURE: 88 MMHG | OXYGEN SATURATION: 100 % | HEART RATE: 70 BPM | RESPIRATION RATE: 21 BRPM

## 2022-04-29 DIAGNOSIS — R51.9 NONINTRACTABLE HEADACHE, UNSPECIFIED CHRONICITY PATTERN, UNSPECIFIED HEADACHE TYPE: Primary | ICD-10-CM

## 2022-04-29 LAB
A/G RATIO: 1.8 (ref 1.1–2.2)
ALBUMIN SERPL-MCNC: 4.6 G/DL (ref 3.4–5)
ALP BLD-CCNC: 97 U/L (ref 40–129)
ALT SERPL-CCNC: 17 U/L (ref 10–40)
ANION GAP SERPL CALCULATED.3IONS-SCNC: 12 MMOL/L (ref 3–16)
AST SERPL-CCNC: 20 U/L (ref 15–37)
BASOPHILS ABSOLUTE: 0.1 K/UL (ref 0–0.2)
BASOPHILS RELATIVE PERCENT: 1 %
BILIRUB SERPL-MCNC: <0.2 MG/DL (ref 0–1)
BUN BLDV-MCNC: 10 MG/DL (ref 7–20)
CALCIUM SERPL-MCNC: 9 MG/DL (ref 8.3–10.6)
CHLORIDE BLD-SCNC: 105 MMOL/L (ref 99–110)
CO2: 25 MMOL/L (ref 21–32)
CREAT SERPL-MCNC: 1.1 MG/DL (ref 0.9–1.3)
EKG ATRIAL RATE: 72 BPM
EKG DIAGNOSIS: NORMAL
EKG P AXIS: 56 DEGREES
EKG P-R INTERVAL: 186 MS
EKG Q-T INTERVAL: 408 MS
EKG QRS DURATION: 100 MS
EKG QTC CALCULATION (BAZETT): 446 MS
EKG R AXIS: -14 DEGREES
EKG T AXIS: 62 DEGREES
EKG VENTRICULAR RATE: 72 BPM
EOSINOPHILS ABSOLUTE: 0.2 K/UL (ref 0–0.6)
EOSINOPHILS RELATIVE PERCENT: 2.7 %
GFR AFRICAN AMERICAN: >60
GFR NON-AFRICAN AMERICAN: >60
GLUCOSE BLD-MCNC: 117 MG/DL (ref 70–99)
HCT VFR BLD CALC: 38.6 % (ref 40.5–52.5)
HEMOGLOBIN: 13 G/DL (ref 13.5–17.5)
LYMPHOCYTES ABSOLUTE: 2.4 K/UL (ref 1–5.1)
LYMPHOCYTES RELATIVE PERCENT: 34.4 %
MCH RBC QN AUTO: 26.6 PG (ref 26–34)
MCHC RBC AUTO-ENTMCNC: 33.6 G/DL (ref 31–36)
MCV RBC AUTO: 79.1 FL (ref 80–100)
MONOCYTES ABSOLUTE: 0.6 K/UL (ref 0–1.3)
MONOCYTES RELATIVE PERCENT: 8 %
NEUTROPHILS ABSOLUTE: 3.8 K/UL (ref 1.7–7.7)
NEUTROPHILS RELATIVE PERCENT: 53.9 %
PDW BLD-RTO: 14.4 % (ref 12.4–15.4)
PLATELET # BLD: 223 K/UL (ref 135–450)
PMV BLD AUTO: 8.1 FL (ref 5–10.5)
POTASSIUM REFLEX MAGNESIUM: 3.9 MMOL/L (ref 3.5–5.1)
PRO-BNP: 31 PG/ML (ref 0–124)
RBC # BLD: 4.88 M/UL (ref 4.2–5.9)
SARS-COV-2, NAAT: NOT DETECTED
SODIUM BLD-SCNC: 142 MMOL/L (ref 136–145)
TOTAL PROTEIN: 7.2 G/DL (ref 6.4–8.2)
TROPONIN: <0.01 NG/ML
WBC # BLD: 7.1 K/UL (ref 4–11)

## 2022-04-29 PROCEDURE — 6360000002 HC RX W HCPCS: Performed by: STUDENT IN AN ORGANIZED HEALTH CARE EDUCATION/TRAINING PROGRAM

## 2022-04-29 PROCEDURE — 70450 CT HEAD/BRAIN W/O DYE: CPT

## 2022-04-29 PROCEDURE — 6370000000 HC RX 637 (ALT 250 FOR IP): Performed by: STUDENT IN AN ORGANIZED HEALTH CARE EDUCATION/TRAINING PROGRAM

## 2022-04-29 PROCEDURE — 80053 COMPREHEN METABOLIC PANEL: CPT

## 2022-04-29 PROCEDURE — 2580000003 HC RX 258: Performed by: STUDENT IN AN ORGANIZED HEALTH CARE EDUCATION/TRAINING PROGRAM

## 2022-04-29 PROCEDURE — 96365 THER/PROPH/DIAG IV INF INIT: CPT

## 2022-04-29 PROCEDURE — 85025 COMPLETE CBC W/AUTO DIFF WBC: CPT

## 2022-04-29 PROCEDURE — 87635 SARS-COV-2 COVID-19 AMP PRB: CPT

## 2022-04-29 PROCEDURE — 36415 COLL VENOUS BLD VENIPUNCTURE: CPT

## 2022-04-29 PROCEDURE — 83880 ASSAY OF NATRIURETIC PEPTIDE: CPT

## 2022-04-29 PROCEDURE — 71045 X-RAY EXAM CHEST 1 VIEW: CPT

## 2022-04-29 PROCEDURE — 93010 ELECTROCARDIOGRAM REPORT: CPT | Performed by: INTERNAL MEDICINE

## 2022-04-29 PROCEDURE — 93005 ELECTROCARDIOGRAM TRACING: CPT | Performed by: STUDENT IN AN ORGANIZED HEALTH CARE EDUCATION/TRAINING PROGRAM

## 2022-04-29 PROCEDURE — 96375 TX/PRO/DX INJ NEW DRUG ADDON: CPT

## 2022-04-29 PROCEDURE — 99285 EMERGENCY DEPT VISIT HI MDM: CPT

## 2022-04-29 PROCEDURE — 84484 ASSAY OF TROPONIN QUANT: CPT

## 2022-04-29 RX ORDER — KETOROLAC TROMETHAMINE 30 MG/ML
15 INJECTION, SOLUTION INTRAMUSCULAR; INTRAVENOUS ONCE
Status: COMPLETED | OUTPATIENT
Start: 2022-04-29 | End: 2022-04-29

## 2022-04-29 RX ORDER — MAGNESIUM SULFATE 1 G/100ML
1000 INJECTION INTRAVENOUS ONCE
Status: COMPLETED | OUTPATIENT
Start: 2022-04-29 | End: 2022-04-29

## 2022-04-29 RX ORDER — HYDROXYZINE HYDROCHLORIDE 25 MG/1
TABLET, FILM COATED ORAL
COMMUNITY
Start: 2022-04-04

## 2022-04-29 RX ORDER — GABAPENTIN 600 MG/1
TABLET ORAL
COMMUNITY
Start: 2022-03-28

## 2022-04-29 RX ORDER — ACETAMINOPHEN 500 MG
1000 TABLET ORAL ONCE
Status: COMPLETED | OUTPATIENT
Start: 2022-04-29 | End: 2022-04-29

## 2022-04-29 RX ORDER — 0.9 % SODIUM CHLORIDE 0.9 %
1000 INTRAVENOUS SOLUTION INTRAVENOUS ONCE
Status: COMPLETED | OUTPATIENT
Start: 2022-04-29 | End: 2022-04-29

## 2022-04-29 RX ORDER — METOCLOPRAMIDE HYDROCHLORIDE 5 MG/ML
10 INJECTION INTRAMUSCULAR; INTRAVENOUS ONCE
Status: COMPLETED | OUTPATIENT
Start: 2022-04-29 | End: 2022-04-29

## 2022-04-29 RX ORDER — DIPHENHYDRAMINE HYDROCHLORIDE 50 MG/ML
25 INJECTION INTRAMUSCULAR; INTRAVENOUS ONCE
Status: COMPLETED | OUTPATIENT
Start: 2022-04-29 | End: 2022-04-29

## 2022-04-29 RX ADMIN — ACETAMINOPHEN 1000 MG: 500 TABLET ORAL at 16:29

## 2022-04-29 RX ADMIN — MAGNESIUM SULFATE IN DEXTROSE 1000 MG: 10 INJECTION, SOLUTION INTRAVENOUS at 16:38

## 2022-04-29 RX ADMIN — SODIUM CHLORIDE 1000 ML: 9 INJECTION, SOLUTION INTRAVENOUS at 16:32

## 2022-04-29 RX ADMIN — KETOROLAC TROMETHAMINE 15 MG: 30 INJECTION, SOLUTION INTRAMUSCULAR at 16:29

## 2022-04-29 RX ADMIN — DIPHENHYDRAMINE HYDROCHLORIDE 25 MG: 50 INJECTION, SOLUTION INTRAMUSCULAR; INTRAVENOUS at 14:45

## 2022-04-29 RX ADMIN — METOCLOPRAMIDE 10 MG: 5 INJECTION, SOLUTION INTRAMUSCULAR; INTRAVENOUS at 14:47

## 2022-04-29 ASSESSMENT — PAIN SCALES - GENERAL
PAINLEVEL_OUTOF10: 4
PAINLEVEL_OUTOF10: 9

## 2022-04-29 ASSESSMENT — PAIN DESCRIPTION - LOCATION
LOCATION: HEAD
LOCATION: HEAD

## 2022-04-29 ASSESSMENT — PAIN - FUNCTIONAL ASSESSMENT: PAIN_FUNCTIONAL_ASSESSMENT: 0-10

## 2022-04-29 ASSESSMENT — PAIN DESCRIPTION - FREQUENCY: FREQUENCY: CONTINUOUS

## 2022-04-29 ASSESSMENT — PAIN DESCRIPTION - DESCRIPTORS
DESCRIPTORS: ACHING
DESCRIPTORS: ACHING

## 2022-04-29 ASSESSMENT — PAIN DESCRIPTION - ONSET: ONSET: ON-GOING

## 2022-04-29 NOTE — Clinical Note
Augustina Sanchez was seen and treated in our emergency department on 4/29/2022. He may return to work on 04/30/2022. If you have any questions or concerns, please don't hesitate to call.       Nirav Muse, DO

## 2022-04-29 NOTE — ED NOTES
Discharge instructions and medications reviewed with patient. Patient verbalized understanding of medications and follow up. All questions answered at this time. IV removed, dressing applied, and bleeding controlled. Skin warm, pink, and dry. Patient alert and oriented x4. Pt ambulated to lobby with a stable gait. Patient discharged home with 0 prescriptions.       Sera Clarke RN  04/29/22 [id number]

## 2022-04-29 NOTE — ED PROVIDER NOTES
[address] [facility] COMPLAINT  Hypertension (pt rpts htn x 3 days with episodes of dizziness, was seen in PCP office today with BP of 190/150 and was directed to be seen in ED)       Heaven Funez is a 55 y.o. male  who presents to the ED complaining of lightheadedness and generalized illness for the last 3 days. He was at his PCPs office today and was noted to have severely elevated blood pressures and advised to come to the ED. Patient states that over the last 3 days he has been feeling poorly, stating that he has a lightheadedness feeling that is worse when he stands. He has had loose stools for the last 3 days as well and an episode of nausea with nonbloody nonbilious emesis this morning. Denies any chest pain. Has chronic shortness of breath with his COPD that is unchanged. Denies any numbness or weakness or tingling. No other complaints, modifying factors or associated symptoms. I have reviewed the following from the nursing documentation.     Past Medical History:   Diagnosis Date    Anxiety     Depression     NSTEMI (non-ST elevated myocardial infarction) ([facility] [address] 12/30/2018    Positive urine drug screen 12/2018    opiate, cocaine, benzos    Reflux     Traumatic rupture of spleen     Wears glasses      Past Surgical History:   Procedure Laterality Date    CORONARY ANGIOPLASTY WITH STENT PLACEMENT      2019    HERNIA REPAIR      KNEE SURGERY Left     ACL    PTCA  12/30/2018    ROTATOR CUFF REPAIR Right      Family History   Problem Relation Age of Onset    Diabetes Mother     Heart Disease Mother     Cancer Sister     Heart Disease Father     Diabetes Brother     Prostate Cancer Maternal Uncle     Stomach Cancer Maternal Grandmother     Cancer Maternal Grandfather      Social History     Socioeconomic History    Marital status:      Spouse name: Not on file    Number of children: Not on file    Years of education: Not on file    Highest education level: Not on file   Occupational History    Not on file   Tobacco Use    Smoking status: Current Every Day Smoker     Packs/day: 0.50     Years: 18.00     Pack years: 9.00     Types: Cigarettes     Start date: 9/9/1990    Smokeless tobacco: Never Used    Tobacco comment: 5/27/2021- smokes 1 pk daily   Vaping Use    Vaping Use: Never used   Substance and Sexual Activity    Alcohol use: Not Currently     Alcohol/week: 3.0 standard drinks     Types: 3 Cans of beer per week     Comment: socially    Drug use: Yes     Types: Marijuana Leticia Carballo)    Sexual activity: Yes     Partners: Female   Other Topics Concern    Not on file   Social History Narrative    ** Merged History Encounter **          Social Determinants of Health     Financial Resource Strain:     Difficulty of Paying Living Expenses: Not on file   Food Insecurity:     Worried About Running Out of Food in the Last Year: Not on file    Jodee of Food in the Last Year: Not on file   Transportation Needs:     Lack of Transportation (Medical): Not on file    Lack of Transportation (Non-Medical):  Not on file   Physical Activity:     Days of Exercise per Week: Not on file    Minutes of Exercise per Session: Not on file   Stress:     Feeling of Stress : Not on file   Social Connections:     Frequency of Communication with Friends and Family: Not on file    Frequency of Social Gatherings with Friends and Family: Not on file    Attends Faith Services: Not on file    Active Member of Clubs or Organizations: Not on file    Attends Club or Organization Meetings: Not on file    Marital Status: Not on file   Intimate Partner Violence:     Fear of Current or Ex-Partner: Not on file    Emotionally Abused: Not on file    Physically Abused: Not on file    Sexually Abused: Not on file   Housing Stability:     Unable to Pay for Housing in the Last Year: Not on file    Number of [place] in the Last Year: Not on file  Unstable Housing in the Last Year: Not on file     Current Facility-Administered Medications   Medication Dose Route Frequency Provider Last Rate Last Admin    metoclopramide (REGLAN) injection 10 mg  10 mg IntraVENous Once Vivica Gulling, DO        diphenhydrAMINE (BENADRYL) injection 25 mg  25 mg IntraVENous Once Vivica Gulling, DO         Current Outpatient Medications   Medication Sig Dispense Refill    hydrOXYzine (ATARAX) 25 MG tablet       gabapentin (NEURONTIN) 600 MG tablet       budesonide-formoterol (SYMBICORT) 160-4.5 MCG/ACT AERO Inhale 2 puffs into the lungs 2 times daily 10.2 g 3    tiotropium (SPIRIVA RESPIMAT) 2.5 MCG/ACT AERS inhaler Inhale 2 puffs into the lungs daily 1 each 3    carvedilol (COREG) 3.125 MG tablet TAKE (1) TABLET TWICE A DAY WITH MEALS 180 tablet 3    pantoprazole (PROTONIX) 40 MG tablet Take 1 tablet by mouth every morning (before breakfast) 30 tablet 2    clopidogrel (PLAVIX) 75 MG tablet TAKE 1 TABLET BY MOUTH DAILY 90 tablet 3    ARIPiprazole (ABILIFY) 5 MG tablet Take 5 mg by mouth daily      VORTIoxetine (TRINTELLIX) 5 MG tablet Take 5 mg by mouth daily      albuterol sulfate HFA (VENTOLIN HFA) 108 (90 Base) MCG/ACT inhaler Inhale 2 puffs into the lungs 4 times daily as needed for Wheezing 3 Inhaler 1    busPIRone (BUSPAR) 10 MG tablet Take 10 mg by mouth 3 times daily      pravastatin (PRAVACHOL) 20 MG tablet TAKE (1) TABLET DAILY (Patient taking differently: 40 mg ) 30 tablet 5    indomethacin (INDOCIN) 50 MG capsule Take 1 capsule by mouth 2 times daily (with meals) 20 capsule 0    aspirin 81 MG chewable tablet Take 1 tablet by mouth daily 30 tablet 0     Allergies   Allergen Reactions    Codeine Nausea Only       REVIEW OF SYSTEMS  10 systems reviewed, pertinent positives per HPI otherwise noted to be negative.     PHYSICAL EXAM  BP (!) 163/95   Pulse 80   Resp 21   Ht 5' 11\" (1.803 m)   Wt 198 lb (89.8 kg)   SpO2 96%   BMI 27.62 kg/m²    General: Appears well. Alert  HEENT: Head atraumatic, Eyes normal inspection, PERRL. Normal ENT inspection, Pharynx normal. No signs of dehydration  NECK: Normal inspection  RESPIRATORY: Normal breath sounds. No chest wall tenderness. No respiratory distress  CVS: Heart rate and rhythm regular. No Murmurs  ABDOMEN/GI: Soft, Non-tender, No distention  BACK: Normal inspection  EXTREMITIES: Non-Tender. Full ROM. Normal appearance. No Pedal edema  NEURO: Alert and oriented. Sensation normal. Motor normal  PSYCH: Mood normal. Affect normal.  SKIN: Color normal. No rash. Warm, Dry    LABS  I have reviewed all labs for this visit. Results for orders placed or performed during the hospital encounter of 04/29/22   EKG 12 Lead   Result Value Ref Range    Ventricular Rate 72 BPM    Atrial Rate 72 BPM    P-R Interval 186 ms    QRS Duration 100 ms    Q-T Interval 408 ms    QTc Calculation (Bazett) 446 ms    P Axis 56 degrees    R Axis -14 degrees    T Axis 62 degrees    Diagnosis       Normal sinus rhythmNormal ECGWhen compared with ECG of 11-MAY-2021 12:54,No significant change was found       ECG  The Ekg interpreted by me shows  Sinus rhythm with a rate of 72 bpm.  Normal axis. No acute injury pattern. , , QTc 446. RADIOLOGY  CT Head WO Contrast   Final Result   No acute intracranial abnormality. XR CHEST PORTABLE   Final Result   No acute process. ED COURSE/MDM  Patient seen and evaluated. Old records reviewed. Labs and imaging reviewed and results discussed with patient. Cardiac work-up obtained as well as CT head due to headache. He is given Reglan and Benadryl. Oddly this significantly decreases blood pressure, but did not relieve his headache. CT head is unremarkable. He is treated with acetaminophen, Toradol, magnesium and IV fluid bolus. On reevaluation he states that his headache pain level is about half of what it previously was.   His blood pressure has remained much more appropriate while in the ED and his lightheadedness that he was previously complained of has resolved. Lab work was reassuring. EKG showed no acute pattern. Given return precautions for severe worsening headache, nausea and vomiting, fevers, or other concerning symptoms. He will take Tylenol as needed for pain. He will follow-up with his PCP on Monday. During the patient's ED course, the patient was given:  Medications   metoclopramide (REGLAN) injection 10 mg (10 mg IntraVENous Given 4/29/22 1447)   diphenhydrAMINE (BENADRYL) injection 25 mg (25 mg IntraVENous Given 4/29/22 1445)   0.9 % sodium chloride bolus (1,000 mLs IntraVENous New Bag 4/29/22 1632)   acetaminophen (TYLENOL) tablet 1,000 mg (1,000 mg Oral Given 4/29/22 1629)   ketorolac (TORADOL) injection 15 mg (15 mg IntraVENous Given 4/29/22 1629)   magnesium sulfate 1000 mg in dextrose 5% 100 mL IVPB (0 mg IntraVENous Stopped 4/29/22 1708)        CLINICAL IMPRESSION  1. Nonintractable headache, unspecified chronicity pattern, unspecified headache type        Blood pressure (!) 140/88, pulse 70, resp. rate 21, height 5' 11\" (1.803 m), weight 198 lb (89.8 kg), SpO2 100 %. [id number] was discharged to home in stable condition. Patient was given scripts for the following medications. I counseled patient how to take these medications. New Prescriptions    No medications on file       Follow-up with:  Alex Stuart, APRN - CNP  [address]  [phone]    Call in 3 days  As needed      DISCLAIMER: This chart was created using Dragon dictation software. Efforts were made by me to ensure accuracy, however some errors may be present due to limitations of this technology and occasionally words are not transcribed correctly.      Conor Forrest DO  04/29/22 [id number]

## 2022-07-26 ENCOUNTER — APPOINTMENT (OUTPATIENT)
Dept: GENERAL RADIOLOGY | Age: 47
End: 2022-07-26
Payer: COMMERCIAL

## 2022-07-26 ENCOUNTER — APPOINTMENT (OUTPATIENT)
Dept: CT IMAGING | Age: 47
End: 2022-07-26
Payer: COMMERCIAL

## 2022-07-26 ENCOUNTER — HOSPITAL ENCOUNTER (EMERGENCY)
Age: 47
Discharge: HOME | End: 2022-07-26
Attending: EMERGENCY MEDICINE
Payer: COMMERCIAL

## 2022-07-26 VITALS
BODY MASS INDEX: 29.15 KG/M2 | OXYGEN SATURATION: 96 % | SYSTOLIC BLOOD PRESSURE: 153 MMHG | DIASTOLIC BLOOD PRESSURE: 87 MMHG | TEMPERATURE: 97.8 F | WEIGHT: 209 LBS | HEART RATE: 55 BPM | RESPIRATION RATE: 18 BRPM

## 2022-07-26 DIAGNOSIS — R42 LIGHTHEADEDNESS: Primary | ICD-10-CM

## 2022-07-26 DIAGNOSIS — I10 PRIMARY HYPERTENSION: ICD-10-CM

## 2022-07-26 LAB
A/G RATIO: 1.7 (ref 1.1–2.2)
ALBUMIN SERPL-MCNC: 4.3 G/DL (ref 3.4–5)
ALP BLD-CCNC: 103 U/L (ref 40–129)
ALT SERPL-CCNC: 14 U/L (ref 10–40)
AMPHETAMINE SCREEN, URINE: ABNORMAL
ANION GAP SERPL CALCULATED.3IONS-SCNC: 12 MMOL/L (ref 3–16)
AST SERPL-CCNC: 17 U/L (ref 15–37)
BARBITURATE SCREEN URINE: ABNORMAL
BASOPHILS ABSOLUTE: 0.1 K/UL (ref 0–0.2)
BASOPHILS RELATIVE PERCENT: 1.3 %
BENZODIAZEPINE SCREEN, URINE: ABNORMAL
BILIRUB SERPL-MCNC: 0.3 MG/DL (ref 0–1)
BUN BLDV-MCNC: 11 MG/DL (ref 7–20)
CALCIUM SERPL-MCNC: 9.5 MG/DL (ref 8.3–10.6)
CANNABINOID SCREEN URINE: POSITIVE
CHLORIDE BLD-SCNC: 106 MMOL/L (ref 99–110)
CO2: 27 MMOL/L (ref 21–32)
COCAINE METABOLITE SCREEN URINE: ABNORMAL
CREAT SERPL-MCNC: 1.2 MG/DL (ref 0.9–1.3)
EKG ATRIAL RATE: 62 BPM
EKG DIAGNOSIS: NORMAL
EKG P AXIS: 46 DEGREES
EKG P-R INTERVAL: 190 MS
EKG Q-T INTERVAL: 432 MS
EKG QRS DURATION: 92 MS
EKG QTC CALCULATION (BAZETT): 438 MS
EKG R AXIS: -12 DEGREES
EKG T AXIS: 64 DEGREES
EKG VENTRICULAR RATE: 62 BPM
EOSINOPHILS ABSOLUTE: 0.2 K/UL (ref 0–0.6)
EOSINOPHILS RELATIVE PERCENT: 3.1 %
GFR AFRICAN AMERICAN: >60
GFR NON-AFRICAN AMERICAN: >60
GLUCOSE BLD-MCNC: 105 MG/DL (ref 70–99)
HCT VFR BLD CALC: 38 % (ref 40.5–52.5)
HEMOGLOBIN: 12.6 G/DL (ref 13.5–17.5)
INR BLD: 0.99 (ref 0.87–1.14)
LYMPHOCYTES ABSOLUTE: 2.5 K/UL (ref 1–5.1)
LYMPHOCYTES RELATIVE PERCENT: 33.5 %
Lab: ABNORMAL
MCH RBC QN AUTO: 26.2 PG (ref 26–34)
MCHC RBC AUTO-ENTMCNC: 33.3 G/DL (ref 31–36)
MCV RBC AUTO: 78.6 FL (ref 80–100)
METHADONE SCREEN, URINE: POSITIVE
MONOCYTES ABSOLUTE: 0.6 K/UL (ref 0–1.3)
MONOCYTES RELATIVE PERCENT: 8.7 %
NEUTROPHILS ABSOLUTE: 4 K/UL (ref 1.7–7.7)
NEUTROPHILS RELATIVE PERCENT: 53.4 %
OPIATE SCREEN URINE: ABNORMAL
OXYCODONE URINE: ABNORMAL
PDW BLD-RTO: 15 % (ref 12.4–15.4)
PH UA: 5
PHENCYCLIDINE SCREEN URINE: ABNORMAL
PLATELET # BLD: 215 K/UL (ref 135–450)
PMV BLD AUTO: 7.9 FL (ref 5–10.5)
POTASSIUM SERPL-SCNC: 4.1 MMOL/L (ref 3.5–5.1)
PROPOXYPHENE SCREEN: ABNORMAL
PROTHROMBIN TIME: 13 SEC (ref 11.7–14.5)
RBC # BLD: 4.83 M/UL (ref 4.2–5.9)
SODIUM BLD-SCNC: 145 MMOL/L (ref 136–145)
TOTAL PROTEIN: 6.9 G/DL (ref 6.4–8.2)
TROPONIN: <0.01 NG/ML
WBC # BLD: 7.4 K/UL (ref 4–11)

## 2022-07-26 PROCEDURE — 85025 COMPLETE CBC W/AUTO DIFF WBC: CPT

## 2022-07-26 PROCEDURE — 36415 COLL VENOUS BLD VENIPUNCTURE: CPT

## 2022-07-26 PROCEDURE — 80053 COMPREHEN METABOLIC PANEL: CPT

## 2022-07-26 PROCEDURE — 93010 ELECTROCARDIOGRAM REPORT: CPT | Performed by: INTERNAL MEDICINE

## 2022-07-26 PROCEDURE — 6360000004 HC RX CONTRAST MEDICATION: Performed by: EMERGENCY MEDICINE

## 2022-07-26 PROCEDURE — 80307 DRUG TEST PRSMV CHEM ANLYZR: CPT

## 2022-07-26 PROCEDURE — 70450 CT HEAD/BRAIN W/O DYE: CPT

## 2022-07-26 PROCEDURE — 84484 ASSAY OF TROPONIN QUANT: CPT

## 2022-07-26 PROCEDURE — 99285 EMERGENCY DEPT VISIT HI MDM: CPT

## 2022-07-26 PROCEDURE — 85610 PROTHROMBIN TIME: CPT

## 2022-07-26 PROCEDURE — 70498 CT ANGIOGRAPHY NECK: CPT

## 2022-07-26 PROCEDURE — 71046 X-RAY EXAM CHEST 2 VIEWS: CPT

## 2022-07-26 PROCEDURE — 93005 ELECTROCARDIOGRAM TRACING: CPT

## 2022-07-26 PROCEDURE — 6370000000 HC RX 637 (ALT 250 FOR IP): Performed by: EMERGENCY MEDICINE

## 2022-07-26 RX ORDER — LABETALOL HYDROCHLORIDE 5 MG/ML
10 INJECTION, SOLUTION INTRAVENOUS ONCE
Status: DISCONTINUED | OUTPATIENT
Start: 2022-07-26 | End: 2022-07-26 | Stop reason: HOSPADM

## 2022-07-26 RX ORDER — HYDROCHLOROTHIAZIDE 25 MG/1
25 TABLET ORAL ONCE
Status: COMPLETED | OUTPATIENT
Start: 2022-07-26 | End: 2022-07-26

## 2022-07-26 RX ADMIN — HYDROCHLOROTHIAZIDE 25 MG: 25 TABLET ORAL at 12:26

## 2022-07-26 RX ADMIN — IOPAMIDOL 75 ML: 755 INJECTION, SOLUTION INTRAVENOUS at 13:08

## 2022-07-26 ASSESSMENT — ENCOUNTER SYMPTOMS
SHORTNESS OF BREATH: 0
ABDOMINAL DISTENTION: 0
ABDOMINAL PAIN: 0
WHEEZING: 0
CHOKING: 0
CHEST TIGHTNESS: 1
EYE REDNESS: 0
COUGH: 0
BACK PAIN: 0

## 2022-07-26 NOTE — DISCHARGE INSTRUCTIONS
Continue your present regimen and follow-up with your primary care doctor for further advice and monitoring of your blood pressure

## 2022-07-26 NOTE — ED PROVIDER NOTES
[address] [facility] Name: Ray Quinn  MRN: [id number]  [place] 1975  Date of evaluation: 7/26/2022  Provider: Andrea Johnson MD    CHIEF COMPLAINT       Chief Complaint   Patient presents with    Dizziness     Patient states that over the past few days he has been feeling light headed. He states that he gets dizzy when he stands. He states that his PCP and cardiologist have been changing his BP meds and he is not currently on any hypertension medication. He states he is having intermittent chest pain and dyspnea. HISTORY OF PRESENT ILLNESS   (Location/Symptom, Timing/Onset, Context/Setting, Quality, Duration, Modifying Factors, Severity)  Note limiting factors. Ray Quinn is a 55 y.o. male who presents to the emergency department     Patient presents emergency department history of apparently having some intermittent high blood pressure has been checking it about a week and a half ago he was discontinued on his hydrochlorothiazide that was started by his family doctor      He does have a positive family history  He has no diabetes  He does have hypertension but is only been on 1 medicine  History of hyperlipidemia unfortunately has been a heavy smoker 2 packs a day now down to about 1/2 to 1 pack a day. He did have a cardiac echo done in his new cardiologist's office about a week ago  It showed an EF of 50%. The patient had been followed by Dr. Garett Moore he did have a previous MI and has 2 stents 2 years ago has had some intermittent chest discomfort on the left side that he is not sure if it feels like it did when he had an MI or not. Also was told he had a small leaky valve. No other history is detected.       His wife says that he is also dizzy and seems unsteady when he walks she says this has been going on for 6 months he denies any headache denies head trauma denies fever denies other neurological symptoms    The history is provided by the patient and the spouse. Nursing Notes were reviewed. REVIEW OF SYSTEMS    (2-9 systems for level 4, 10 or more for level 5)     Review of Systems   Constitutional:  Negative for activity change, chills and fever. HENT:  Negative for congestion. Eyes:  Negative for redness and visual disturbance. Respiratory:  Positive for chest tightness. Negative for cough, choking, shortness of breath and wheezing. Cardiovascular:  Positive for chest pain. Gastrointestinal:  Negative for abdominal distention and abdominal pain. Musculoskeletal:  Negative for back pain and neck pain. Neurological:  Negative for dizziness, weakness, light-headedness and headaches. Hematological:  Negative for adenopathy. Psychiatric/Behavioral:  Negative for behavioral problems. All other systems reviewed and are negative. Except as noted above the remainder of the review of systems was reviewed and negative. PAST MEDICAL HISTORY     Past Medical History:   Diagnosis Date    Anxiety     Depression     NSTEMI (non-ST elevated myocardial infarction) ([facility] [address] 12/30/2018    Positive urine drug screen 12/2018    opiate, cocaine, benzos    Reflux     Traumatic rupture of spleen     Wears glasses          SURGICAL HISTORY       Past Surgical History:   Procedure Laterality Date    CORONARY ANGIOPLASTY WITH STENT PLACEMENT      2019    HERNIA REPAIR      KNEE SURGERY Left     ACL    PTCA  12/30/2018    ROTATOR CUFF REPAIR Right          CURRENT MEDICATIONS       Previous Medications    ALBUTEROL SULFATE HFA (VENTOLIN HFA) 108 (90 BASE) MCG/ACT INHALER    Inhale 2 puffs into the lungs 4 times daily as needed for Wheezing    ARIPIPRAZOLE (ABILIFY) 5 MG TABLET    Take 10 mg by mouth in the morning.     ASPIRIN 81 MG CHEWABLE TABLET    Take 1 tablet by mouth daily    BUDESONIDE-FORMOTEROL (SYMBICORT) 160-4.5 MCG/ACT AERO    Inhale 2 puffs into the lungs 2 times daily    BUSPIRONE (BUSPAR) 10 MG TABLET    Take 10 mg by mouth 3 times daily    CARVEDILOL (COREG) 3.125 MG TABLET    TAKE (1) TABLET TWICE A DAY WITH MEALS    GABAPENTIN (NEURONTIN) 600 MG TABLET        HYDROXYZINE (ATARAX) 25 MG TABLET        INDOMETHACIN (INDOCIN) 50 MG CAPSULE    Take 1 capsule by mouth 2 times daily (with meals)    PANTOPRAZOLE (PROTONIX) 40 MG TABLET    Take 1 tablet by mouth every morning (before breakfast)    PRAVASTATIN (PRAVACHOL) 20 MG TABLET    TAKE (1) TABLET DAILY    TIOTROPIUM (SPIRIVA RESPIMAT) 2.5 MCG/ACT AERS INHALER    Inhale 2 puffs into the lungs daily    VORTIOXETINE (TRINTELLIX) 5 MG TABLET    Take 10 mg by mouth in the morning.        ALLERGIES     Codeine    FAMILY HISTORY       Family History   Problem Relation Age of Onset    Diabetes Mother     Heart Disease Mother     Cancer Sister     Heart Disease Father     Diabetes Brother     Prostate Cancer Maternal Uncle     Stomach Cancer Maternal Grandmother     Cancer Maternal Grandfather           SOCIAL HISTORY       Social History     Socioeconomic History    Marital status:      Spouse name: None    Number of children: None    Years of education: None    Highest education level: None   Tobacco Use    Smoking status: Every Day     Packs/day: 0.50     Years: 18.00     Pack years: 9.00     Types: Cigarettes     Start date: 9/9/1990    Smokeless tobacco: Never    Tobacco comments:     5/27/2021- smokes 1 pk daily   Vaping Use    Vaping Use: Never used   Substance and Sexual Activity    Alcohol use: Not Currently     Alcohol/week: 3.0 standard drinks     Types: 3 Cans of beer per week     Comment: socially    Drug use: Yes     Types: Marijuana Martín Joseph)    Sexual activity: Yes     Partners: Female   Social History Narrative    ** Merged History Encounter **            SCREENINGS    Cristi Coma Scale  Eye Opening: Spontaneous  Best Verbal Response: Oriented  Best Motor Response: Obeys commands  Cristi Coma Scale Score: 15          PHYSICAL EXAM    (up to 7 for level 4, 8 or more for level 5)     ED Triage Vitals [07/26/22 1126]   BP Temp Temp Source Heart Rate Resp SpO2 Height Weight   (!) 191/110 97.8 °F (36.6 °C) Oral 67 20 97 % -- 190 lb (86.2 kg)       Physical Exam  Vitals and nursing note reviewed. Constitutional:       General: He is not in acute distress. Appearance: He is well-developed. He is not diaphoretic. HENT:      Head: Normocephalic. Right Ear: Ear canal and external ear normal.      Left Ear: Ear canal and external ear normal.      Nose: Nose normal.      Mouth/Throat:      Mouth: Mucous membranes are moist.   Eyes:      Conjunctiva/sclera: Conjunctivae normal.      Pupils: Pupils are equal, round, and reactive to light. Neck:      Thyroid: No thyroid mass or thyromegaly. Vascular: No carotid bruit. Trachea: Trachea and phonation normal.   Cardiovascular:      Rate and Rhythm: Normal rate and regular rhythm. Heart sounds: Normal heart sounds. No murmur heard. No friction rub. No gallop. Pulmonary:      Effort: Pulmonary effort is normal. No respiratory distress. Breath sounds: Normal breath sounds. Abdominal:      General: Bowel sounds are normal. There is no distension. Palpations: Abdomen is soft. Tenderness: There is no abdominal tenderness. Musculoskeletal:         General: Normal range of motion. Cervical back: Full passive range of motion without pain, normal range of motion and neck supple. Skin:     General: Skin is warm. Capillary Refill: Capillary refill takes less than 2 seconds. Neurological:      General: No focal deficit present. Mental Status: He is alert and oriented to person, place, and time. GCS: GCS eye subscore is 4. GCS verbal subscore is 5. GCS motor subscore is 6. Cranial Nerves: No cranial nerve deficit. Sensory: No sensory deficit. Motor: No abnormal muscle tone.       Coordination: Coordination normal.      Deep Tendon Reflexes: Reflexes normal. Psychiatric:         Behavior: Behavior normal.     His gait appears to be fairly normal although he states that he is a little bit unsteady when he is walking but I do not appreciate an objective physical finding that would be abnormal with his walking  He has no nystagmus  He performed finger-to-nose fairly adequately without difficulty his test of skew is negative and he has no obvious severe vertical rotary or even horizontal nystagmus  DIAGNOSTIC RESULTS     EKG: All EKG's are interpreted by the Emergency Department Physician who either signs or Co-signs this chart in the absence of a cardiologist.  His rate is 62  Rhythm sinus  No acute ST-T wave changes  Intervals normal  No other abnormalities noted      RADIOLOGY:   Non-plain film images such as CT, Ultrasound and MRI are read by the radiologist. Plain radiographic images are visualized and preliminarily interpreted by the emergency physician with the below findings:        Interpretation per the Radiologist below, if available at the time of this note:    CTA HEAD NECK W CONTRAST   Preliminary Result   1. Unremarkable CT angiogram of the head and neck. 2. Nonspecific patchy areas of ground-glass opacity within the right upper   lobe, likely inflammatory or infectious. CT HEAD WO CONTRAST   Final Result   No acute intracranial abnormality. XR CHEST (2 VW)   Final Result   No acute cardiopulmonary process noted.                  LABS:  Results for orders placed or performed during the hospital encounter of 07/26/22   CBC with Auto Differential   Result Value Ref Range    WBC 7.4 4.0 - 11.0 K/uL    RBC 4.83 4.20 - 5.90 M/uL    Hemoglobin 12.6 (L) 13.5 - 17.5 g/dL    Hematocrit 38.0 (L) 40.5 - 52.5 %    MCV 78.6 (L) 80.0 - 100.0 fL    MCH 26.2 26.0 - 34.0 pg    MCHC 33.3 31.0 - 36.0 g/dL    RDW 15.0 12.4 - 15.4 %    Platelets 314 553 - 562 K/uL    MPV 7.9 5.0 - 10.5 fL    Neutrophils % 53.4 %    Lymphocytes % 33.5 %    Monocytes % 8.7 % Eosinophils % 3.1 %    Basophils % 1.3 %    Neutrophils Absolute 4.0 1.7 - 7.7 K/uL    Lymphocytes Absolute 2.5 1.0 - 5.1 K/uL    Monocytes Absolute 0.6 0.0 - 1.3 K/uL    Eosinophils Absolute 0.2 0.0 - 0.6 K/uL    Basophils Absolute 0.1 0.0 - 0.2 K/uL   Comprehensive Metabolic Panel   Result Value Ref Range    Sodium 145 136 - 145 mmol/L    Potassium 4.1 3.5 - 5.1 mmol/L    Chloride 106 99 - 110 mmol/L    CO2 27 21 - 32 mmol/L    Anion Gap 12 3 - 16    Glucose 105 (H) 70 - 99 mg/dL    BUN 11 7 - 20 mg/dL    Creatinine 1.2 0.9 - 1.3 mg/dL    GFR Non-African American >60 >60    GFR African American >60 >60    Calcium 9.5 8.3 - 10.6 mg/dL    Total Protein 6.9 6.4 - 8.2 g/dL    Albumin 4.3 3.4 - 5.0 g/dL    Albumin/Globulin Ratio 1.7 1.1 - 2.2    Total Bilirubin 0.3 0.0 - 1.0 mg/dL    Alkaline Phosphatase 103 40 - 129 U/L    ALT 14 10 - 40 U/L    AST 17 15 - 37 U/L   Protime-INR   Result Value Ref Range    Protime 13.0 11.7 - 14.5 sec    INR 0.99 0.87 - 1.14   Troponin   Result Value Ref Range    Troponin <0.01 <0.01 ng/mL   Urine Drug Screen   Result Value Ref Range    Amphetamine Screen, Urine Neg Negative <1000ng/mL    Barbiturate Screen, Ur Neg Negative <200 ng/mL    Benzodiazepine Screen, Urine Neg Negative <200 ng/mL    Cannabinoid Scrn, Ur POSITIVE (A) Negative <50 ng/mL    Cocaine Metabolite Screen, Urine Neg Negative <300 ng/mL    Opiate Scrn, Ur Neg Negative <300 ng/mL    PCP Screen, Urine Neg Negative <25 ng/mL    Methadone Screen, Urine POSITIVE (A) Negative <300 ng/mL    Propoxyphene Scrn, Ur Neg Negative <300 ng/mL    Oxycodone Urine Neg Negative <100 ng/ml    pH, UA 5.0     Drug Screen Comment: see below    EKG 12 Lead   Result Value Ref Range    Ventricular Rate 62 BPM    Atrial Rate 62 BPM    P-R Interval 190 ms    QRS Duration 92 ms    Q-T Interval 432 ms    QTc Calculation (Bazett) 438 ms    P Axis 46 degrees    R Axis -12 degrees    T Axis 64 degrees    Diagnosis       Normal sinus rhythmNormal ECGWhen compared with ECG of 29-APR-2022 14:20,No significant change was found            EMERGENCY DEPARTMENT COURSE and DIFFERENTIAL DIAGNOSIS/MDM:     Vitals:    07/26/22 1126 07/26/22 1128 07/26/22 1215 07/26/22 1348   BP: (!) 191/110  (!) 173/93 (!) 153/87   Pulse: 67  50 55   Resp: 20  18 18   Temp: 97.8 °F (36.6 °C)      TempSrc: Oral      SpO2: 97%  97% 96%   Weight: 190 lb (86.2 kg) 209 lb (94.8 kg)             MDM        REASSESSMENT      Blood pressure actually came down a little bit the patient is advised to follow-up with his primary care physician and/or cardiologist for further advice and further monitoring of his blood pressure but at this point his work-up objectively is negative his symptoms and physical exam are also negative so at this point he is being discharged with reassurance    CRITICAL CARE TIME     CONSULTS:  None      PROCEDURES:     Procedures    MEDICATIONS GIVEN THIS VISIT:  Medications   labetalol (NORMODYNE;TRANDATE) injection 10 mg (has no administration in time range)   hydroCHLOROthiazide (HYDRODIURIL) tablet 25 mg (25 mg Oral Given 7/26/22 1226)   iopamidol (ISOVUE-370) 76 % injection 75 mL (75 mLs IntraVENous Given 7/26/22 1308)        FINAL IMPRESSION      1. Lightheadedness    2. Primary hypertension            DISPOSITION/PLAN   DISPOSITION Decision To Discharge 07/26/2022 01:56:17 PM      PATIENT REFERRED TO:  CATERINA Anand - CNP  [address]  [phone]    Schedule an appointment as soon as possible for a visit in 1 week  Have your blood pressure rechecked    DISCHARGE MEDICATIONS:  New Prescriptions    No medications on file       Controlled Substances Monitoring  No flowsheet data found.         (Please note that portions of this note were completed with a voice recognition program.  Efforts were made to edit the dictations but occasionally words are mis-transcribed.)    Patient was advised to return to the Emergency Department if there was any worsening.     Anu Reyes MD (electronically signed)  Attending Emergency Physician          Si Hammans, MD  07/26/22 2983

## 2022-10-31 RX ORDER — CARVEDILOL 3.12 MG/1
TABLET ORAL
Qty: 90 TABLET | Refills: 0 | Status: SHIPPED | OUTPATIENT
Start: 2022-10-31

## 2022-12-09 RX ORDER — DULOXETIN HYDROCHLORIDE 20 MG/1
40 CAPSULE, DELAYED RELEASE ORAL 2 TIMES DAILY
COMMUNITY

## 2022-12-09 RX ORDER — CLOPIDOGREL BISULFATE 75 MG/1
75 TABLET ORAL DAILY
COMMUNITY

## 2022-12-09 NOTE — PROGRESS NOTES
PAT completed with wife Iliana Briceño orders placed per MD, she states she will be  and caregiver day of procedure. Percy Manzanares RN

## 2022-12-09 NOTE — PROGRESS NOTES
..1.  Do not eat or drink anything after 12 midnight prior to surgery. This includes no water, chewing gum or mints, except for bowel prep complete per MD.  2.  Take the following pills with a small sip of water on the morning of surgery 12/14/2022.  3.  Aspirin, Ibuprofen, Advil, Naproxen, Vitamin E and other Anti-inflammatory products should be stopped for 5 days before surgery or as directed by your physician. 4.  Check with your doctor regarding stopping Plavix, Coumadin, Lovenox, Fragmin or other blood thinners. 5.  Do not smoke and do not drink alcoholic beverages 24 hours prior to surgery. This includes NA Beer. 6.  You may brush your teeth and gargle the morning of surgery. DO NOT SWALLOW WATER.  7.  You MUST make arrangements for a responsible adult to take you home after your surgery. You will not be allowed to leave alone or drive yourself home. It is strongly suggested someone stay with you the first 24 hours. Your surgery will be cancelled if you do not have a ride home. 8.  A parent/legal guardian must accompany a child scheduled for surgery and plan to stay at the hospital until the child is discharged. Please do not bring other children with you. 9.  Please wear simple, loose fitting clothing to the hospital.  Lucien Chen not bring valuables ( money, credit cards, checkbooks, etc.)  Do not wear any makeup (including no eye makeup) or nail polish on your fingers or toes. 10.  Do not wear any jewelry or piercing on the day of surgery. All body piercing jewelry must be removed. 11.  If you have dentures, they will be removed before going to the OR; we will provide you a container. If you wear contact lenses or glasses, they will be removed; please bring a case for them.   15.  Notify your Surgeon if you develop any illness between now and surgery time; cough, cold, fever, sore throat, nausea, vomiting, etc.  Please notify your surgeon if you experience dizziness, shortness of breath or blurred vision between now and the time of your surgery. To provide excellent care, visitors will be limited to two in a room at any given time. Please no children under the age of 15 in the surgical department.

## 2022-12-13 ENCOUNTER — ANESTHESIA EVENT (OUTPATIENT)
Dept: ENDOSCOPY | Age: 47
End: 2022-12-13
Payer: COMMERCIAL

## 2022-12-14 ENCOUNTER — HOSPITAL ENCOUNTER (OUTPATIENT)
Age: 47
Setting detail: OUTPATIENT SURGERY
Discharge: HOME | End: 2022-12-14
Attending: INTERNAL MEDICINE | Admitting: INTERNAL MEDICINE
Payer: COMMERCIAL

## 2022-12-14 ENCOUNTER — ANESTHESIA (OUTPATIENT)
Dept: ENDOSCOPY | Age: 47
End: 2022-12-14
Payer: COMMERCIAL

## 2022-12-14 VITALS
RESPIRATION RATE: 16 BRPM | HEART RATE: 82 BPM | BODY MASS INDEX: 25.77 KG/M2 | WEIGHT: 180 LBS | HEIGHT: 70 IN | DIASTOLIC BLOOD PRESSURE: 65 MMHG | TEMPERATURE: 97.2 F | SYSTOLIC BLOOD PRESSURE: 109 MMHG | OXYGEN SATURATION: 97 %

## 2022-12-14 DIAGNOSIS — K92.0 COFFEE GROUND EMESIS: ICD-10-CM

## 2022-12-14 PROCEDURE — 3609012400 HC EGD TRANSORAL BIOPSY SINGLE/MULTIPLE: Performed by: INTERNAL MEDICINE

## 2022-12-14 PROCEDURE — 88305 TISSUE EXAM BY PATHOLOGIST: CPT

## 2022-12-14 PROCEDURE — 7100000011 HC PHASE II RECOVERY - ADDTL 15 MIN: Performed by: INTERNAL MEDICINE

## 2022-12-14 PROCEDURE — 7100000010 HC PHASE II RECOVERY - FIRST 15 MIN: Performed by: INTERNAL MEDICINE

## 2022-12-14 PROCEDURE — 3700000000 HC ANESTHESIA ATTENDED CARE: Performed by: INTERNAL MEDICINE

## 2022-12-14 PROCEDURE — 6360000002 HC RX W HCPCS: Performed by: NURSE ANESTHETIST, CERTIFIED REGISTERED

## 2022-12-14 PROCEDURE — 2709999900 HC NON-CHARGEABLE SUPPLY: Performed by: INTERNAL MEDICINE

## 2022-12-14 PROCEDURE — 2500000003 HC RX 250 WO HCPCS: Performed by: NURSE ANESTHETIST, CERTIFIED REGISTERED

## 2022-12-14 PROCEDURE — 3700000001 HC ADD 15 MINUTES (ANESTHESIA): Performed by: INTERNAL MEDICINE

## 2022-12-14 RX ORDER — PROPOFOL 10 MG/ML
INJECTION, EMULSION INTRAVENOUS PRN
Status: DISCONTINUED | OUTPATIENT
Start: 2022-12-14 | End: 2022-12-14 | Stop reason: SDUPTHER

## 2022-12-14 RX ORDER — LIDOCAINE HYDROCHLORIDE 20 MG/ML
INJECTION, SOLUTION INFILTRATION; PERINEURAL PRN
Status: DISCONTINUED | OUTPATIENT
Start: 2022-12-14 | End: 2022-12-14 | Stop reason: SDUPTHER

## 2022-12-14 RX ORDER — LABETALOL HYDROCHLORIDE 5 MG/ML
10 INJECTION, SOLUTION INTRAVENOUS
Status: DISCONTINUED | OUTPATIENT
Start: 2022-12-14 | End: 2022-12-14 | Stop reason: HOSPADM

## 2022-12-14 RX ORDER — DIPHENHYDRAMINE HYDROCHLORIDE 50 MG/ML
12.5 INJECTION INTRAMUSCULAR; INTRAVENOUS
Status: DISCONTINUED | OUTPATIENT
Start: 2022-12-14 | End: 2022-12-14 | Stop reason: HOSPADM

## 2022-12-14 RX ORDER — OXYCODONE HYDROCHLORIDE 5 MG/1
10 TABLET ORAL PRN
Status: DISCONTINUED | OUTPATIENT
Start: 2022-12-14 | End: 2022-12-14 | Stop reason: HOSPADM

## 2022-12-14 RX ORDER — SODIUM CHLORIDE 0.9 % (FLUSH) 0.9 %
5-40 SYRINGE (ML) INJECTION EVERY 12 HOURS SCHEDULED
Status: DISCONTINUED | OUTPATIENT
Start: 2022-12-14 | End: 2022-12-14 | Stop reason: HOSPADM

## 2022-12-14 RX ORDER — SODIUM CHLORIDE, SODIUM LACTATE, POTASSIUM CHLORIDE, CALCIUM CHLORIDE 600; 310; 30; 20 MG/100ML; MG/100ML; MG/100ML; MG/100ML
INJECTION, SOLUTION INTRAVENOUS CONTINUOUS
Status: DISCONTINUED | OUTPATIENT
Start: 2022-12-14 | End: 2022-12-14 | Stop reason: HOSPADM

## 2022-12-14 RX ORDER — MEPERIDINE HYDROCHLORIDE 25 MG/ML
12.5 INJECTION INTRAMUSCULAR; INTRAVENOUS; SUBCUTANEOUS EVERY 5 MIN PRN
Status: DISCONTINUED | OUTPATIENT
Start: 2022-12-14 | End: 2022-12-14 | Stop reason: HOSPADM

## 2022-12-14 RX ORDER — SODIUM CHLORIDE 9 MG/ML
25 INJECTION, SOLUTION INTRAVENOUS PRN
Status: DISCONTINUED | OUTPATIENT
Start: 2022-12-14 | End: 2022-12-14 | Stop reason: HOSPADM

## 2022-12-14 RX ORDER — SODIUM CHLORIDE 0.9 % (FLUSH) 0.9 %
5-40 SYRINGE (ML) INJECTION PRN
Status: DISCONTINUED | OUTPATIENT
Start: 2022-12-14 | End: 2022-12-14 | Stop reason: HOSPADM

## 2022-12-14 RX ORDER — ONDANSETRON 2 MG/ML
4 INJECTION INTRAMUSCULAR; INTRAVENOUS
Status: DISCONTINUED | OUTPATIENT
Start: 2022-12-14 | End: 2022-12-14 | Stop reason: HOSPADM

## 2022-12-14 RX ORDER — OXYCODONE HYDROCHLORIDE 5 MG/1
5 TABLET ORAL PRN
Status: DISCONTINUED | OUTPATIENT
Start: 2022-12-14 | End: 2022-12-14 | Stop reason: HOSPADM

## 2022-12-14 RX ADMIN — PROPOFOL 200 MG: 10 INJECTION, EMULSION INTRAVENOUS at 11:43

## 2022-12-14 RX ADMIN — LIDOCAINE HYDROCHLORIDE 60 MG: 20 INJECTION, SOLUTION INFILTRATION; PERINEURAL at 11:43

## 2022-12-14 ASSESSMENT — LIFESTYLE VARIABLES: SMOKING_STATUS: 1

## 2022-12-14 ASSESSMENT — ENCOUNTER SYMPTOMS: SHORTNESS OF BREATH: 1

## 2022-12-14 ASSESSMENT — PAIN - FUNCTIONAL ASSESSMENT: PAIN_FUNCTIONAL_ASSESSMENT: NONE - DENIES PAIN

## 2022-12-14 NOTE — H&P
History and Physical / Pre-Sedation Assessment    Patient:  Nela Beasley   :   1975     Intended Procedure:  EGD    HPI: 51 yo with N/V    Past Medical History:   Diagnosis Date    Anxiety     CAD (coronary artery disease)     COPD (chronic obstructive pulmonary disease) ([facility])     Depression     Hyperlipidemia     NSTEMI (non-ST elevated myocardial infarction) ([facility] [address] 2018    Positive urine drug screen 2018    opiate, cocaine, benzos    Reflux     Traumatic rupture of spleen     Wears glasses      Past Surgical History:   Procedure Laterality Date    CORONARY ANGIOPLASTY WITH STENT PLACEMENT          HERNIA REPAIR      KNEE SURGERY Left     ACL    PTCA  2018    ROTATOR CUFF REPAIR Right        Medications reviewed  Prior to Admission medications    Medication Sig Start Date End Date Taking? Authorizing Provider   clopidogrel (PLAVIX) 75 MG tablet Take 75 mg by mouth daily   Yes Historical Provider, MD   DULoxetine (CYMBALTA) 20 MG extended release capsule Take 40 mg by mouth 2 times daily   Yes Historical Provider, MD   carvedilol (COREG) 3.125 MG tablet TAKE (1) TABLET TWICE A DAY WITH MEALS 10/31/22   Ramya Adame MD   hydrOXYzine (ATARAX) 25 MG tablet  22   Historical Provider, MD   gabapentin (NEURONTIN) 600 MG tablet Take 600 mg by mouth 3 times daily. 3/28/22   Historical Provider, MD   budesonide-formoterol (SYMBICORT) 160-4.5 MCG/ACT AERO Inhale 2 puffs into the lungs 2 times daily 21   Jr Peck MD   tiotropium (SPIRIVA RESPIMAT) 2.5 MCG/ACT AERS inhaler Inhale 2 puffs into the lungs daily  Patient not taking: Reported on 2022   Jr Peck MD   pantoprazole (PROTONIX) 40 MG tablet Take 1 tablet by mouth every morning (before breakfast) 21   Jr Peck MD   ARIPiprazole (ABILIFY) 5 MG tablet Take 10 mg by mouth in the morning. Historical Provider, MD   VORTIoxetine (TRINTELLIX) 5 MG tablet Take 10 mg by mouth in the morning. Historical Provider, MD   albuterol sulfate HFA (VENTOLIN HFA) 108 (90 Base) MCG/ACT inhaler Inhale 2 puffs into the lungs 4 times daily as needed for Wheezing 5/27/21   Kvng Ortiz MD   pravastatin (PRAVACHOL) 20 MG tablet TAKE (1) TABLET DAILY  Patient taking differently: 40 mg 11/12/20   Chino Ayala MD   indomethacin (INDOCIN) 50 MG capsule Take 1 capsule by mouth 2 times daily (with meals) 11/30/19   Stephanie Langford DO   aspirin 81 MG chewable tablet Take 1 tablet by mouth daily 1/2/19   Jamey Wagner DO        Allergies: Allergies   Allergen Reactions    Codeine Nausea Only       Nurses notes reviewed and agreed. Physical Exam:  Vital Signs: /87   Pulse 77   Temp 97.8 °F (36.6 °C) (Temporal)   Resp 16   Ht 5' 10\" (1.778 m)   Wt 180 lb (81.6 kg)   SpO2 99%   BMI 25.83 kg/m²    Airway: Mallampati: II (soft palate, uvula, fauces visible)  Pulmonary:Normal  Cardiac:Normal  Abdomen:Normal    Pre-Procedure Assessment / Plan:  ASA: Class 3 - A patient with severe systemic disease that limits activity but is not incapacitating  Level of Sedation Plan: Moderate sedation  Post Procedure plan: Return to same level of care    I assessed the patient and find that the patient is in satisfactory condition to proceed with the planned procedure and sedation plan. I have explained the risk, benefits, and alternatives to the procedure; the patient understands and agrees to proceed.        Radu Peter MD  12/14/2022

## 2022-12-14 NOTE — ANESTHESIA POSTPROCEDURE EVALUATION
Department of Anesthesiology  Postprocedure Note    Patient: Yanna Parkinson  MRN: [id number]  YOB: 1975  Date of evaluation: 12/14/2022      Procedure Summary     Date: 12/14/22 Room / Location: [place] [address] 01 / [facility]    Anesthesia Start: 1139 Anesthesia Stop: 2739    Procedure: EGD BIOPSY Diagnosis:       Coffee ground emesis      ([address])    Surgeons: Roel Richard MD Responsible Provider: Aaliyah Shah MD    Anesthesia Type: MAC ASA Status: 3          Anesthesia Type: No value filed. Doug Phase I: Doug Score: 10    Doug Phase II: Doug Score: 9      Anesthesia Post Evaluation    Patient location during evaluation: PACU  Patient participation: complete - patient participated  Level of consciousness: awake and alert  Airway patency: patent  Nausea & Vomiting: no nausea and no vomiting  Complications: no  Cardiovascular status: blood pressure returned to baseline  Respiratory status: acceptable  Hydration status: euvolemic  Comments: VSS on transfer to phase 2 recovery. No anesthetic complications.

## 2022-12-14 NOTE — OP NOTE
[address]                 [address] [facility] [address]                                OPERATIVE REPORT    PATIENT NAME: Ada Newell                  :        1975  MED REC NO:   [id number]                          ROOM:  ACCOUNT NO:   [de-identified]                           ADMIT DATE: 2022  PROVIDER:     Bonnie Contreras MD    DATE OF PROCEDURE:  2022    PREPROCEDURE DIAGNOSIS:  Nausea, vomiting. POSTPROCEDURE DIAGNOSIS:  1.  A 2-cm hiatal hernia. 2.  Mild gastritis. 3.  Otherwise normal EGD. PROCEDURE:  EGD with biopsy. SURGEON:  Bonnie Contreras MD    PROCEDURE INDICATIONS:  This is a 20-year-old male with history of COPD;  hypertension; coronary artery disease, status post stents; arthritis;  and anxiety who presents with complaints of intermittent nausea and  vomiting, particularly in the morning. He also complains of loss of  appetite and severe constipation. He drinks 12-pack of beer per day for  over 10 years. He has been abstinent however for the past 2 years. He  admits to marijuana use, however. MEDICATIONS:  MAC, per Anesthesia. PROCEDURE DETAILS:  Informed consent obtained after discussing risks,  benefits, and alternatives. Full history and physical was performed. The patient was classified as ASA class III. Medications were given by  Anesthesia. Cardiopulmonary status was continuously monitored  throughout procedure. The patient was placed in left lateral decubitus  position. Once adequately sedated, a standard upper gastroscope was  inserted in the mouth and advanced under direct visualization to second  portion of the duodenum. The entire mucosa of the esophagus, stomach  (retroflexion and forward views), duodenum (bulb, sweep, and second  portion) were examined carefully during withdrawal.  The patient  tolerated the procedure well without any difficulties.     FINDINGS:  Esophagus: There is a small hiatal hernia identified from 35-37 cm from  entry, otherwise no evidence of inflammation, ulcers, strictures, or  Ji's. Stomach: There was evidence of mild gastritis in the antrum  characterized by erythema and granularity. Biopsies were taken to rule  out H. pylori. Retroflexed view of the stomach was normal.    Duodenum:  Examined portion of the duodenum appeared normal.  Biopsies  were taken from second portion to rule out celiac sprue. SUMMARY:  1.  Mild gastritis. 2.  Small hiatal hernia. 3.  Otherwise normal EGD. RECOMMENDATIONS:  1. Discharge patient to home when standard parameters are met. 2.  Continue PPI daily. 3.  Await pathology results. 4.  Counseled marijuana abstinence. 5.  May consider CT scan of the abdomen and pelvis if symptoms persist.  6.  To rule out pancreas pathology. 7.  Consider a trial of Carafate. 8.  Follow up if not improved.     EBL: <5mL    Julia Nolan MD    D: 12/14/2022 12:07:59       T: 12/14/2022 12:10:27     [id number]  Job#: [id number]     Doc#: [id number]    CC:  Rosezetta Schlatter, MD Jeanetta Parkers, Le[place]

## 2022-12-14 NOTE — BRIEF OP NOTE
Brief Postoperative Note      Patient: Grace Almanza  YOB: 1975  MRN: [id number]    Date of Procedure: 12/14/2022    Pre-Op Diagnosis: COFFEE GROUNG EMESIS    Post-Op Diagnosis:  gastritis       Procedure(s):  EGD BIOPSY    Surgeon(s):  iRchard Alexander MD    Assistant:  * No surgical staff found *    Anesthesia: Monitor Anesthesia Care    Estimated Blood Loss (mL): Minimal    Complications: None    Specimens:   ID Type Source Tests Collected by Time Destination   A :  Tissue Biopsy SURGICAL PATHOLOGY Richard Alexander MD 12/14/2022 1144    B :  Tissue Biopsy SURGICAL PATHOLOGY Richard Alexander MD 12/14/2022 1145        Implants:  * No implants in log *      Drains: * No LDAs found *    Findings: gastrjitis    Electronically signed by Richard Alexander MD on 12/14/2022 at 11:55 AM

## 2022-12-14 NOTE — DISCHARGE INSTRUCTIONS
PATIENT INSTRUCTIONS  POST-SEDATION    Osvaldo Noble          IMMEDIATELY FOLLOWING PROCEDURE:    Do not drive or operate machinery for the first twenty four hours after surgery. Do not make any important decisions for twenty four hours after surgery or while taking narcotic pain medications or sedatives. You should NOT BE LEFT UNATTENDED OR ALONE. A responsible adult should be with you for the rest of the day of your procedure and also during the night for your protection and safety. You may experience some light headedness. Rest at home with activity as tolerated. You may not need to go to bed, but it is important to rest for the next 24 hours. You should not engage in athletic sports such as basketball, volleyball, jogging, skating, or activities requiring refined motor skills for 24 hours. If you develop intractable nausea and vomiting or a severe headache please notify your doctor immediately. You are not expected to have any fever, but if you feel warm, take your temperature. If you have a fever 101 degrees or higher, call your doctor. If you have had an Endoscopy:   *Eat lightly for your first meal and gradually resume your normal / prescribed diet. *If you have a sore throat you may use lozenges, or salt water gargles. *If you have had a colonoscopy, do not expect a normal bowel movement for approximately three days due to the cleansing of the large intestine prior to colonoscopy. ONCE YOU ARE HOME, IF YOU SHOULD HAVE:  Difficulty in breathing, persistent nausea or vomiting, bleeding you feel is excessive, or pain that is unusual, increased abdominal bloating, or any swelling, fever / chills, call your physician. If you cannot contact your physician, but feel that your signs and symptoms need a physician's attention, go to the Emergency Department. FOLLOW-UP:    Please follow up with Regla FORD as scheduled or needed.       Dr. Nba Dey office will call you with the biopsy findings. Call Dr. Dee Ojeda if there are any GI concerns. [phone]. Upper GI Endoscopy: What to Expect at [address] had an upper GI endoscopy. Your doctor used a thin, lighted tube that bends to look at the inside of your esophagus, your stomach, and the first part of the small intestine, called the duodenum. After you have an endoscopy, you will stay at the hospital or clinic for 1 to 2 hours. This will allow the medicine to wear off. You will be able to go home after your doctor or nurse checks to make sure that you're not having any problems. You may have to stay overnight if you had treatment during the test. You may have a sore throat for a day or two after the test.  This care sheet gives you a general idea about what to expect after the test.  How can you care for yourself at home? Activity   Rest as much as you need to after you go home. You should be able to go back to your usual activities the day after the test.  Diet   Follow your doctor's directions for eating after the test.  Drink plenty of fluids (unless your doctor has told you not to). Medications   If you have a sore throat the day after the test, use an over-the-counter spray to numb your throat. Follow-up care is a key part of your treatment and safety. Be sure to make and go to all appointments, and call your doctor if you are having problems. It's also a good idea to know your test results and keep a list of the medicines you take. When should you call for help? Call 911 anytime you think you may need emergency care. For example, call if:    You passed out (lost consciousness). You have trouble breathing. You pass maroon or bloody stools. Call your doctor now or seek immediate medical care if:    You have pain that does not get better after your take pain medicine. You have new or worse belly pain. You have blood in your stools.      You are sick to your stomach and

## 2022-12-14 NOTE — ANESTHESIA PRE PROCEDURE
Pia Nguyen,    aspirin 81 MG chewable tablet Take 1 tablet by mouth daily 1/2/19   Jessica Mckeon DO       Current medications:    Current Facility-Administered Medications   Medication Dose Route Frequency Provider Last Rate Last Admin    lactated ringers infusion   IntraVENous Continuous Melvin Espinoza MD           Allergies: Allergies   Allergen Reactions    Codeine Nausea Only       Problem List:    Patient Active Problem List   Diagnosis Code    Other forms of angina pectoris ([facility]) I20.8    NSTEMI s/p PCI and stenting of two LAD lesions I21.4    Tobacco smoker Z72.0    Coronary artery disease I25.10    Anxiety and depression F41.9, F32. A    Alcohol use vs abuse Z78.9    Cocaine use F14.10    Mixed hyperlipidemia E78.2    SOB (shortness of breath) R06.02    Fatigue R53.83    Chronic bronchitis ([facility]) J42    JOHNATHAN on CPAP G47.33, Z99.89    KJ (acute kidney injury) ([facility]) N17.9    Marijuana use F12.90    GERD (gastroesophageal reflux disease) K21.9    Nasal congestion R09.81    Restrictive lung disease J98.4       Past Medical History:        Diagnosis Date    Anxiety     CAD (coronary artery disease)     COPD (chronic obstructive pulmonary disease) ([facility])     Depression     Hyperlipidemia     NSTEMI (non-ST elevated myocardial infarction) ([facility] [address] 12/30/2018    Positive urine drug screen 12/2018    opiate, cocaine, benzos    Reflux     Traumatic rupture of spleen     Wears glasses        Past Surgical History:        Procedure Laterality Date    CORONARY ANGIOPLASTY WITH STENT PLACEMENT      2019    HERNIA REPAIR      KNEE SURGERY Left     ACL    PTCA  12/30/2018    ROTATOR CUFF REPAIR Right        Social History:    Social History     Tobacco Use    Smoking status: Every Day     Packs/day: 0.50     Years: 18.00     Pack years: 9.00     Types: Cigarettes     Start date: 9/9/1990    Smokeless tobacco: Never    Tobacco comments:     5/27/2021- smokes 1 pk daily   Substance Use Topics    Alcohol use: Not Currently     Alcohol/week: 3.0 standard drinks     Types: 3 Cans of beer per week     Comment: socially                                Ready to quit: Not Answered  Counseling given: Not Answered  Tobacco comments: 5/27/2021- smokes 1 pk daily      Vital Signs (Current):   Vitals:    12/09/22 1225 12/14/22 1054   BP:  124/87   Pulse:  77   Resp:  16   Temp:  97.8 °F (36.6 °C)   TempSrc:  Temporal   SpO2:  99%   Weight: 180 lb (81.6 kg) 180 lb (81.6 kg)   Height: 5' 10\" (1.778 m) 5' 10\" (1.778 m)                                              BP Readings from Last 3 Encounters:   12/14/22 124/87   07/26/22 (!) 153/87   04/29/22 (!) 140/88       NPO Status: Time of last liquid consumption: 2000                        Time of last solid consumption: 2000                        Date of last liquid consumption: 12/13/22                        Date of last solid food consumption: 12/13/22    BMI:   Wt Readings from Last 3 Encounters:   12/14/22 180 lb (81.6 kg)   07/26/22 209 lb (94.8 kg)   04/29/22 198 lb (89.8 kg)     Body mass index is 25.83 kg/m².     CBC:   Lab Results   Component Value Date/Time    WBC 7.4 07/26/2022 12:03 PM    RBC 4.83 07/26/2022 12:03 PM    HGB 12.6 07/26/2022 12:03 PM    HCT 38.0 07/26/2022 12:03 PM    MCV 78.6 07/26/2022 12:03 PM    RDW 15.0 07/26/2022 12:03 PM     07/26/2022 12:03 PM       CMP:   Lab Results   Component Value Date/Time     07/26/2022 12:03 PM    K 4.1 07/26/2022 12:03 PM    K 3.9 04/29/2022 02:30 PM     07/26/2022 12:03 PM    CO2 27 07/26/2022 12:03 PM    BUN 11 07/26/2022 12:03 PM    CREATININE 1.2 07/26/2022 12:03 PM    GFRAA >60 07/26/2022 12:03 PM    GFRAA >60 10/27/2011 01:33 PM    AGRATIO 1.7 07/26/2022 12:03 PM    LABGLOM >60 07/26/2022 12:03 PM    GLUCOSE 105 07/26/2022 12:03 PM    PROT 6.9 07/26/2022 12:03 PM    PROT 8.6 10/27/2011 01:33 PM    CALCIUM 9.5 07/26/2022 12:03 PM    BILITOT 0.3 07/26/2022 12:03 PM    ALKPHOS 103 07/26/2022 12:03 PM    AST 17 07/26/2022 12:03 PM    ALT 14 07/26/2022 12:03 PM       POC Tests: No results for input(s): POCGLU, POCNA, POCK, POCCL, POCBUN, POCHEMO, POCHCT in the last 72 hours. Coags:   Lab Results   Component Value Date/Time    PROTIME 13.0 07/26/2022 12:03 PM    INR 0.99 07/26/2022 12:03 PM    APTT 32.3 12/30/2018 02:11 PM       HCG (If Applicable): No results found for: PREGTESTUR, PREGSERUM, HCG, HCGQUANT     ABGs: No results found for: PHART, PO2ART, PIR5GER, JSN5XMR, BEART, E0RQORFZ     Type & Screen (If Applicable):  No results found for: LABABO, LABRH    Drug/Infectious Status (If Applicable):  No results found for: HIV, HEPCAB    COVID-19 Screening (If Applicable):   Lab Results   Component Value Date/Time    COVID19 Not Detected 04/29/2022 02:49 PM    COVID19 Not Detected 06/22/2020 03:14 PM           Anesthesia Evaluation   no history of anesthetic complications:   Airway: Mallampati: II  TM distance: >3 FB   Neck ROM: full  Mouth opening: > = 3 FB   Dental:    (+) upper dentures and lower dentures      Pulmonary:   (+) COPD:  shortness of breath:  sleep apnea: on CPAP,  current smoker                           Cardiovascular:    (+) past MI: > 6 months, CAD: no interval change, CABG/stent (cor stent x 2): no interval change, hyperlipidemia    (-)  angina                Neuro/Psych:   (+) psychiatric history:depression/anxiety             GI/Hepatic/Renal:   (+) GERD:,          ROS comment: EtOH use. Endo/Other: Negative Endo/Other ROS                    Abdominal:             Vascular: negative vascular ROS. Other Findings:           Anesthesia Plan      MAC     ASA 3     (Risks, benefits and alternatives of MAC anesthesia discussed with pt. Questions answered. Willing to proceed.)  Induction: intravenous. Anesthetic plan and risks discussed with patient.                         Oz Brown MD   12/14/2022

## 2023-01-07 NOTE — ADDENDUM NOTE
Addendum  created 01/07/23 0757 by CATERINA Ramirez CRNA    Intraprocedure Event edited, Intraprocedure Staff edited

## 2023-01-23 RX ORDER — CARVEDILOL 3.12 MG/1
TABLET ORAL
Qty: 90 TABLET | Refills: 0 | OUTPATIENT
Start: 2023-01-23

## 2023-01-23 NOTE — TELEPHONE ENCOUNTER
01/23 Called and spoke to pt and pt wife, stated he is seeing new cardiologist in [facility] and will ask that MD to fill medication.

## 2023-01-23 NOTE — TELEPHONE ENCOUNTER
LOV 11/18/2020  Upcoming appt n/a  BMP & CBC 07/26/2022      FRONT- please schedule annual appt with pt and reroute to board.  Ty!

## 2023-05-06 ENCOUNTER — APPOINTMENT (OUTPATIENT)
Dept: CT IMAGING | Age: 48
End: 2023-05-06
Payer: COMMERCIAL

## 2023-05-06 ENCOUNTER — APPOINTMENT (OUTPATIENT)
Dept: GENERAL RADIOLOGY | Age: 48
End: 2023-05-06
Payer: COMMERCIAL

## 2023-05-06 ENCOUNTER — HOSPITAL ENCOUNTER (EMERGENCY)
Age: 48
Discharge: HOME | End: 2023-05-06
Attending: EMERGENCY MEDICINE
Payer: COMMERCIAL

## 2023-05-06 VITALS
SYSTOLIC BLOOD PRESSURE: 124 MMHG | DIASTOLIC BLOOD PRESSURE: 74 MMHG | OXYGEN SATURATION: 98 % | HEART RATE: 68 BPM | TEMPERATURE: 98.3 F | RESPIRATION RATE: 18 BRPM | WEIGHT: 190 LBS | HEIGHT: 70 IN | BODY MASS INDEX: 27.2 KG/M2

## 2023-05-06 DIAGNOSIS — D72.829 LEUKOCYTOSIS, UNSPECIFIED TYPE: ICD-10-CM

## 2023-05-06 DIAGNOSIS — R42 LIGHTHEADEDNESS: Primary | ICD-10-CM

## 2023-05-06 LAB
ALBUMIN SERPL-MCNC: 4.5 G/DL (ref 3.4–5)
ALBUMIN/GLOB SERPL: 1.2 {RATIO} (ref 1.1–2.2)
ALP SERPL-CCNC: 121 U/L (ref 40–129)
ALT SERPL-CCNC: 47 U/L (ref 10–40)
ANION GAP SERPL CALCULATED.3IONS-SCNC: 14 MMOL/L (ref 3–16)
ANISOCYTOSIS BLD QL SMEAR: ABNORMAL
AST SERPL-CCNC: 39 U/L (ref 15–37)
BASOPHILS # BLD: 0 K/UL (ref 0–0.2)
BASOPHILS NFR BLD: 0 %
BILIRUB SERPL-MCNC: 0.4 MG/DL (ref 0–1)
BILIRUB UR QL STRIP.AUTO: NEGATIVE
BUN SERPL-MCNC: 10 MG/DL (ref 7–20)
CALCIUM SERPL-MCNC: 10.6 MG/DL (ref 8.3–10.6)
CHLORIDE SERPL-SCNC: 97 MMOL/L (ref 99–110)
CLARITY UR: CLEAR
CO2 SERPL-SCNC: 24 MMOL/L (ref 21–32)
COLOR UR: NORMAL
CREAT SERPL-MCNC: 1.3 MG/DL (ref 0.9–1.3)
DEPRECATED RDW RBC AUTO: 16.6 % (ref 12.4–15.4)
EKG ATRIAL RATE: 79 BPM
EKG DIAGNOSIS: NORMAL
EKG P AXIS: 45 DEGREES
EKG P-R INTERVAL: 178 MS
EKG Q-T INTERVAL: 396 MS
EKG QRS DURATION: 92 MS
EKG QTC CALCULATION (BAZETT): 454 MS
EKG R AXIS: -62 DEGREES
EKG T AXIS: 63 DEGREES
EKG VENTRICULAR RATE: 79 BPM
EOSINOPHIL # BLD: 0 K/UL (ref 0–0.6)
EOSINOPHIL NFR BLD: 0 %
GFR SERPLBLD CREATININE-BSD FMLA CKD-EPI: >60 ML/MIN/{1.73_M2}
GLUCOSE BLD-MCNC: 144 MG/DL (ref 70–99)
GLUCOSE SERPL-MCNC: 120 MG/DL (ref 70–99)
GLUCOSE UR STRIP.AUTO-MCNC: NEGATIVE MG/DL
HCT VFR BLD AUTO: 42.7 % (ref 40.5–52.5)
HGB BLD-MCNC: 14.2 G/DL (ref 13.5–17.5)
HGB UR QL STRIP.AUTO: NEGATIVE
KETONES UR STRIP.AUTO-MCNC: NEGATIVE MG/DL
LEUKOCYTE ESTERASE UR QL STRIP.AUTO: NEGATIVE
LYMPHOCYTES # BLD: 3.5 K/UL (ref 1–5.1)
LYMPHOCYTES NFR BLD: 16 %
MCH RBC QN AUTO: 26.7 PG (ref 26–34)
MCHC RBC AUTO-ENTMCNC: 33.1 G/DL (ref 31–36)
MCV RBC AUTO: 80.6 FL (ref 80–100)
MONOCYTES # BLD: 0.2 K/UL (ref 0–1.3)
MONOCYTES NFR BLD: 1 %
NEUTROPHILS # BLD: 18.3 K/UL (ref 1.7–7.7)
NEUTROPHILS NFR BLD: 83 %
NITRITE UR QL STRIP.AUTO: NEGATIVE
PERFORMED ON: ABNORMAL
PH UR STRIP.AUTO: 7 [PH] (ref 5–8)
PLATELET # BLD AUTO: 422 K/UL (ref 135–450)
PLATELET BLD QL SMEAR: ADEQUATE
PMV BLD AUTO: 7.4 FL (ref 5–10.5)
POTASSIUM SERPL-SCNC: 4.2 MMOL/L (ref 3.5–5.1)
PROT SERPL-MCNC: 8.3 G/DL (ref 6.4–8.2)
PROT UR STRIP.AUTO-MCNC: NEGATIVE MG/DL
RBC # BLD AUTO: 5.3 M/UL (ref 4.2–5.9)
SLIDE REVIEW: ABNORMAL
SODIUM SERPL-SCNC: 135 MMOL/L (ref 136–145)
SP GR UR STRIP.AUTO: 1.01 (ref 1–1.03)
TROPONIN, HIGH SENSITIVITY: 13 NG/L (ref 0–22)
TROPONIN, HIGH SENSITIVITY: 15 NG/L (ref 0–22)
UA COMPLETE W REFLEX CULTURE PNL UR: NORMAL
UA DIPSTICK W REFLEX MICRO PNL UR: NORMAL
URN SPEC COLLECT METH UR: NORMAL
UROBILINOGEN UR STRIP-ACNC: 0.2 E.U./DL
WBC # BLD AUTO: 22.1 K/UL (ref 4–11)

## 2023-05-06 PROCEDURE — 70450 CT HEAD/BRAIN W/O DYE: CPT

## 2023-05-06 PROCEDURE — 71045 X-RAY EXAM CHEST 1 VIEW: CPT

## 2023-05-06 PROCEDURE — 36415 COLL VENOUS BLD VENIPUNCTURE: CPT

## 2023-05-06 PROCEDURE — 81003 URINALYSIS AUTO W/O SCOPE: CPT

## 2023-05-06 PROCEDURE — 93010 ELECTROCARDIOGRAM REPORT: CPT | Performed by: INTERNAL MEDICINE

## 2023-05-06 PROCEDURE — 85025 COMPLETE CBC W/AUTO DIFF WBC: CPT

## 2023-05-06 PROCEDURE — 99285 EMERGENCY DEPT VISIT HI MDM: CPT

## 2023-05-06 PROCEDURE — 80053 COMPREHEN METABOLIC PANEL: CPT

## 2023-05-06 PROCEDURE — 72125 CT NECK SPINE W/O DYE: CPT

## 2023-05-06 PROCEDURE — 84484 ASSAY OF TROPONIN QUANT: CPT

## 2023-05-06 PROCEDURE — 93005 ELECTROCARDIOGRAM TRACING: CPT | Performed by: EMERGENCY MEDICINE

## 2023-05-06 PROCEDURE — 2580000003 HC RX 258: Performed by: EMERGENCY MEDICINE

## 2023-05-06 RX ORDER — QUETIAPINE FUMARATE 300 MG/1
300 TABLET, FILM COATED ORAL NIGHTLY
COMMUNITY

## 2023-05-06 RX ORDER — ALLOPURINOL 100 MG/1
200 TABLET ORAL DAILY
COMMUNITY

## 2023-05-06 RX ORDER — SERTRALINE HYDROCHLORIDE 25 MG/1
50 TABLET, FILM COATED ORAL DAILY
COMMUNITY

## 2023-05-06 RX ORDER — HYDROXYZINE PAMOATE 50 MG/1
50 CAPSULE ORAL 3 TIMES DAILY PRN
COMMUNITY

## 2023-05-06 RX ORDER — EZETIMIBE 10 MG/1
10 TABLET ORAL DAILY
COMMUNITY

## 2023-05-06 RX ORDER — FERROUS SULFATE 325(65) MG
325 TABLET ORAL
COMMUNITY

## 2023-05-06 RX ORDER — HYDROCHLOROTHIAZIDE 25 MG/1
25 TABLET ORAL DAILY
COMMUNITY

## 2023-05-06 RX ORDER — 0.9 % SODIUM CHLORIDE 0.9 %
1000 INTRAVENOUS SOLUTION INTRAVENOUS ONCE
Status: COMPLETED | OUTPATIENT
Start: 2023-05-06 | End: 2023-05-06

## 2023-05-06 RX ADMIN — SODIUM CHLORIDE 1000 ML: 9 INJECTION, SOLUTION INTRAVENOUS at 14:20

## 2023-05-06 ASSESSMENT — PAIN - FUNCTIONAL ASSESSMENT
PAIN_FUNCTIONAL_ASSESSMENT: NONE - DENIES PAIN
PAIN_FUNCTIONAL_ASSESSMENT: 0-10

## 2023-05-06 ASSESSMENT — PAIN SCALES - GENERAL: PAINLEVEL_OUTOF10: 10

## 2023-05-06 ASSESSMENT — LIFESTYLE VARIABLES
HOW OFTEN DO YOU HAVE A DRINK CONTAINING ALCOHOL: NEVER
HOW MANY STANDARD DRINKS CONTAINING ALCOHOL DO YOU HAVE ON A TYPICAL DAY: 1 OR 2

## 2023-05-06 NOTE — ED NOTES
Pt ambulates in hallway without incident or c/o's. Gait steady & even and even.  MD made aware     Jessie Sullivan RN  05/06/23 5883

## 2023-05-06 NOTE — DISCHARGE INSTRUCTIONS
You are seen in the emergency department for lightheadedness. Your lab work is overall reassuring. Other testing is otherwise reassuring. You have an elevated white blood cell count that is likely related to the steroids that she been on recently. Recommend that he drink plenty of fluids in the upcoming days. Follow with your primary doctor within the next 48 hours for reassessment. Return to emergency department develop any chest pain loss of consciousness new numbness weakness or other emergent concerns.

## 2023-05-06 NOTE — ED NOTES
Orthostatic vs completed per order. Pt remains asymptomatic and without c/o's.  MD made aware     Jessie Sullivan RN  05/06/23 3026

## 2023-05-06 NOTE — ED PROVIDER NOTES
system and may contain errors related to that system including errors in grammar, punctuation, and spelling, as well as words and phrases that may be inappropriate. If there are any questions or concerns please feel free to contact the dictating provider for clarification.      Brittnee Escobar MD  [facility]

## 2023-05-06 NOTE — ED NOTES
Pt states he believes he has a L shoulder rotator cuff tear because his L UE \"hurts all the time. \" He denies injury to site. Pt also states he believes he may be dehydrated because he is \"shaking all the time. \" Denies N/V/D. Pts wife states she wants him tested for \"Parkinsons Disease and pre-diabetes\" because \"he hasn't acted right in years. \" Pt alert and without s/s distress, cardiac monitor applied     Jet Sandoval RN  05/06/23 1969

## 2023-07-17 ENCOUNTER — APPOINTMENT (OUTPATIENT)
Dept: CT IMAGING | Age: 48
End: 2023-07-17
Payer: COMMERCIAL

## 2023-07-17 ENCOUNTER — APPOINTMENT (OUTPATIENT)
Dept: GENERAL RADIOLOGY | Age: 48
End: 2023-07-17
Payer: COMMERCIAL

## 2023-07-17 ENCOUNTER — HOSPITAL ENCOUNTER (EMERGENCY)
Age: 48
Discharge: HOME | End: 2023-07-17
Payer: COMMERCIAL

## 2023-07-17 VITALS
TEMPERATURE: 98.5 F | RESPIRATION RATE: 16 BRPM | WEIGHT: 185 LBS | DIASTOLIC BLOOD PRESSURE: 86 MMHG | OXYGEN SATURATION: 95 % | HEART RATE: 87 BPM | SYSTOLIC BLOOD PRESSURE: 116 MMHG | BODY MASS INDEX: 26.48 KG/M2 | HEIGHT: 70 IN

## 2023-07-17 DIAGNOSIS — M25.461 EFFUSION OF BOTH KNEE JOINTS: ICD-10-CM
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
## 2023-07-17 DIAGNOSIS — Z98.1 STATUS POST CERVICAL SPINAL FUSION: ICD-10-CM

## 2023-07-17 DIAGNOSIS — S93.402A SPRAIN OF LEFT ANKLE, UNSPECIFIED LIGAMENT, INITIAL ENCOUNTER: ICD-10-CM

## 2023-07-17 DIAGNOSIS — M25.462 EFFUSION OF BOTH KNEE JOINTS: ICD-10-CM

## 2023-07-17 DIAGNOSIS — S92.352A DISPLACED FRACTURE OF FIFTH METATARSAL BONE, LEFT FOOT, INITIAL ENCOUNTER FOR CLOSED FRACTURE: ICD-10-CM

## 2023-07-17 PROCEDURE — 73610 X-RAY EXAM OF ANKLE: CPT

## 2023-07-17 PROCEDURE — 73630 X-RAY EXAM OF FOOT: CPT

## 2023-07-17 PROCEDURE — 72125 CT NECK SPINE W/O DYE: CPT

## 2023-07-17 PROCEDURE — 99284 EMERGENCY DEPT VISIT MOD MDM: CPT

## 2023-07-17 PROCEDURE — 73562 X-RAY EXAM OF KNEE 3: CPT

## 2023-07-17 PROCEDURE — 70450 CT HEAD/BRAIN W/O DYE: CPT

## 2023-07-17 PROCEDURE — 6370000000 HC RX 637 (ALT 250 FOR IP): Performed by: PHYSICIAN ASSISTANT

## 2023-07-17 RX ORDER — ACETAMINOPHEN 500 MG
1000 TABLET ORAL ONCE
Status: COMPLETED | OUTPATIENT
Start: 2023-07-17 | End: 2023-07-17

## 2023-07-17 RX ORDER — OXYCODONE HYDROCHLORIDE 5 MG/1
5 TABLET ORAL ONCE
Status: COMPLETED | OUTPATIENT
Start: 2023-07-17 | End: 2023-07-17

## 2023-07-17 RX ADMIN — ACETAMINOPHEN 1000 MG: 500 TABLET ORAL at 19:27

## 2023-07-17 RX ADMIN — OXYCODONE HYDROCHLORIDE 5 MG: 5 TABLET ORAL at 17:26

## 2023-07-17 ASSESSMENT — PAIN SCALES - GENERAL
PAINLEVEL_OUTOF10: 10

## 2023-07-17 ASSESSMENT — PAIN - FUNCTIONAL ASSESSMENT: PAIN_FUNCTIONAL_ASSESSMENT: NONE - DENIES PAIN

## 2023-07-17 ASSESSMENT — PAIN DESCRIPTION - LOCATION: LOCATION: ANKLE

## 2023-07-17 NOTE — ED NOTES
Patient states he wants to go home, wife says she has crutches in her car.  Pharmacy      Andrew Rosario RN  07/17/23 1941

## 2023-07-17 NOTE — DISCHARGE INSTRUCTIONS
No weightbearing on the left lower extremity. Call orthopedics first thing tomorrow morning to schedule an appointment to be seen. Use the wheelchair for nonweightbearing status because of your left foot fracture  Use the walker for nonweightbearing status because of your left foot fracture.

## 2023-07-17 NOTE — ED PROVIDER NOTES
[address] [address] ED  EMERGENCY DEPARTMENT ENCOUNTER        Pt Name: Bg Terrazas  MRN: [id number]  [address] [facility] 1975  Date of evaluation: 7/17/2023  Provider: CIARA Dietrich  PCP: CATERINA Gardner CNP  Note Started: 5:04 PM EDT 7/17/23      DEMETRIUS. I have evaluated this patient. CHIEF COMPLAINT       Chief Complaint   Patient presents with    Fall     EMS states pt had a fall during the night around 0300, unsure what caused it, possibly tripped in the dark but also states felt a little lightheaded, c/o pain in right knee, left knee foot and ankle, denies loc, did hit chin, arrives in [facility] but states that is from a procedure a week ago unrelated to the fall       HISTORY OF PRESENT ILLNESS: 1 or more Elements     History from : Patient and Family wife    Limitations to history : None    Laura Sy is a 52 y.o. male with past medical history of coronary artery disease status post PCI, polysubstance abuse, hyperlipidemia, tobacco abuse, GERD, restrictive lung disease who presents via EMS from his home for evaluation of fall. Pt is on Aspirin and Plavix. Has past medical history of spina bifida without hydrocephalus, cervical myelopathy, he was recently treated for high-grade stenosis C5-6 C6-7 and had C5-7 anterior cervical discectomy and fusion. This was on July 10th. Patient notes that around 3 in the morning he was having some muscle spasms he notes that ever since he started taking gabapentin he has been having these. He tried to get up and he fell in the bathroom, landing on his left side. He hit his chin. He was wearing his [place] J collar. He denies severe headache or loss of consciousness. He laid on the ground for may be a minute or 2 before his wife came and helped him get up. He injured his left ankle and both of his knees. He denies any mid to low back pain. Denies any abdominal pain chest pain.   He denies any new numbness tingling or weakness to

## 2023-07-17 NOTE — ED TRIAGE NOTES
Chief Complaint   Patient presents with    Fall     EMS states pt had a fall during the night around 0300, unsure what caused it, possibly tripped in the dark but also states felt a little lightheaded, c/o pain in right knee, left knee foot and ankle, denies loc, did hit chin, arrives in [facility] but states that is from a procedure a week ago unrelated to the fall

## 2023-07-17 NOTE — ED NOTES
Ice pack applied. Pt resting in bed and denies needs at this time.      aCl Ferrara RN  07/17/23 0952

## 2023-07-18 ENCOUNTER — TELEPHONE (OUTPATIENT)
Dept: ORTHOPEDIC SURGERY | Age: 48
End: 2023-07-18

## 2023-07-18 NOTE — TELEPHONE ENCOUNTER
General Question     Subject: RETURN A CALL ABOUT APPT  Patient and /or Facility Request: Barbara Reich  Contact Number: [phone]    PATIENT WIFE YULISSA CALLED IN RETURNING A CALL  N E Suraj Abrams E PATIENT ANKLE FRACTURE TO GO TO [address] [place] [address]. Brodie Kirkpatrick     PLEASE ADVISE

## 2023-07-20 ENCOUNTER — OFFICE VISIT (OUTPATIENT)
Dept: ORTHOPEDIC SURGERY | Age: 48
End: 2023-07-20

## 2023-07-20 VITALS — HEIGHT: 70 IN | WEIGHT: 185 LBS | BODY MASS INDEX: 26.48 KG/M2

## 2023-07-20 DIAGNOSIS — F17.200 CURRENT SMOKER: ICD-10-CM

## 2023-07-20 DIAGNOSIS — S92.352A DISPLACED FRACTURE OF FIFTH METATARSAL BONE, LEFT FOOT, INITIAL ENCOUNTER FOR CLOSED FRACTURE: ICD-10-CM

## 2023-07-20 DIAGNOSIS — S90.31XA CONTUSION OF RIGHT FOOT, INITIAL ENCOUNTER: Primary | ICD-10-CM

## 2023-09-02 ENCOUNTER — APPOINTMENT (OUTPATIENT)
Dept: GENERAL RADIOLOGY | Age: 48
DRG: 193 | End: 2023-09-02
Payer: COMMERCIAL

## 2023-09-02 ENCOUNTER — HOSPITAL ENCOUNTER (INPATIENT)
Age: 48
LOS: 2 days | Discharge: HOME | DRG: 193 | End: 2023-09-05
Attending: STUDENT IN AN ORGANIZED HEALTH CARE EDUCATION/TRAINING PROGRAM | Admitting: STUDENT IN AN ORGANIZED HEALTH CARE EDUCATION/TRAINING PROGRAM
Payer: COMMERCIAL

## 2023-09-02 ENCOUNTER — APPOINTMENT (OUTPATIENT)
Dept: CT IMAGING | Age: 48
DRG: 193 | End: 2023-09-02
Payer: COMMERCIAL

## 2023-09-02 DIAGNOSIS — R41.82 ALTERED MENTAL STATUS, UNSPECIFIED ALTERED MENTAL STATUS TYPE: ICD-10-CM

## 2023-09-02 DIAGNOSIS — J18.9 PNEUMONIA DUE TO INFECTIOUS ORGANISM, UNSPECIFIED LATERALITY, UNSPECIFIED PART OF LUNG: ICD-10-CM

## 2023-09-02 DIAGNOSIS — N17.9 AKI (ACUTE KIDNEY INJURY) (HCC): ICD-10-CM

## 2023-09-02 DIAGNOSIS — R53.1 GENERAL WEAKNESS: Primary | ICD-10-CM

## 2023-09-02 LAB
ALBUMIN SERPL-MCNC: 3.3 G/DL (ref 3.4–5)
ALP SERPL-CCNC: 87 U/L (ref 40–129)
ALT SERPL-CCNC: <5 U/L (ref 10–40)
AMMONIA PLAS-SCNC: 31 UMOL/L (ref 16–60)
ANION GAP SERPL CALCULATED.3IONS-SCNC: 10 MMOL/L (ref 3–16)
AST SERPL-CCNC: 11 U/L (ref 15–37)
BASOPHILS # BLD: 0.1 K/UL (ref 0–0.2)
BASOPHILS NFR BLD: 0.5 %
BILIRUB SERPL-MCNC: <0.2 MG/DL (ref 0–1)
BILIRUB UR QL STRIP.AUTO: NEGATIVE
BUN SERPL-MCNC: 25 MG/DL (ref 7–20)
CALCIUM SERPL-MCNC: 8.5 MG/DL (ref 8.3–10.6)
CHLORIDE SERPL-SCNC: 89 MMOL/L (ref 99–110)
CLARITY UR: CLEAR
CO2 SERPL-SCNC: 28 MMOL/L (ref 21–32)
COLOR UR: YELLOW
CREAT SERPL-MCNC: 2.1 MG/DL (ref 0.9–1.3)
DEPRECATED RDW RBC AUTO: 14.9 % (ref 12.4–15.4)
EOSINOPHIL # BLD: 0.1 K/UL (ref 0–0.6)
EOSINOPHIL NFR BLD: 1.2 %
GFR SERPLBLD CREATININE-BSD FMLA CKD-EPI: 38 ML/MIN/{1.73_M2}
GLUCOSE SERPL-MCNC: 112 MG/DL (ref 70–99)
GLUCOSE UR STRIP.AUTO-MCNC: NEGATIVE MG/DL
HCT VFR BLD AUTO: 28.1 % (ref 40.5–52.5)
HGB BLD-MCNC: 9.7 G/DL (ref 13.5–17.5)
HGB UR QL STRIP.AUTO: NEGATIVE
KETONES UR STRIP.AUTO-MCNC: NEGATIVE MG/DL
LACTATE BLDV-SCNC: 0.9 MMOL/L (ref 0.4–2)
LEUKOCYTE ESTERASE UR QL STRIP.AUTO: NEGATIVE
LIPASE SERPL-CCNC: 22 U/L (ref 13–60)
LYMPHOCYTES # BLD: 2.2 K/UL (ref 1–5.1)
LYMPHOCYTES NFR BLD: 18.7 %
MAGNESIUM SERPL-MCNC: 1.9 MG/DL (ref 1.8–2.4)
MCH RBC QN AUTO: 28.4 PG (ref 26–34)
MCHC RBC AUTO-ENTMCNC: 34.5 G/DL (ref 31–36)
MCV RBC AUTO: 82.5 FL (ref 80–100)
MONOCYTES # BLD: 0.8 K/UL (ref 0–1.3)
MONOCYTES NFR BLD: 6.3 %
NEUTROPHILS # BLD: 8.8 K/UL (ref 1.7–7.7)
NEUTROPHILS NFR BLD: 73.3 %
NITRITE UR QL STRIP.AUTO: NEGATIVE
PH UR STRIP.AUTO: 6 [PH] (ref 5–8)
PLATELET # BLD AUTO: 269 K/UL (ref 135–450)
PMV BLD AUTO: 8.6 FL (ref 5–10.5)
POTASSIUM SERPL-SCNC: 2.8 MMOL/L (ref 3.5–5.1)
PROT SERPL-MCNC: 5.9 G/DL (ref 6.4–8.2)
PROT UR STRIP.AUTO-MCNC: NEGATIVE MG/DL
RBC # BLD AUTO: 3.41 M/UL (ref 4.2–5.9)
SODIUM SERPL-SCNC: 127 MMOL/L (ref 136–145)
SP GR UR STRIP.AUTO: 1.01 (ref 1–1.03)
TROPONIN, HIGH SENSITIVITY: 21 NG/L (ref 0–22)
UA COMPLETE W REFLEX CULTURE PNL UR: NORMAL
UA DIPSTICK W REFLEX MICRO PNL UR: NORMAL
URN SPEC COLLECT METH UR: NORMAL
UROBILINOGEN UR STRIP-ACNC: 0.2 E.U./DL
WBC # BLD AUTO: 12 K/UL (ref 4–11)

## 2023-09-02 PROCEDURE — 84075 ASSAY ALKALINE PHOSPHATASE: CPT

## 2023-09-02 PROCEDURE — 96361 HYDRATE IV INFUSION ADD-ON: CPT

## 2023-09-02 PROCEDURE — 82040 ASSAY OF SERUM ALBUMIN: CPT

## 2023-09-02 PROCEDURE — 81003 URINALYSIS AUTO W/O SCOPE: CPT

## 2023-09-02 PROCEDURE — 84450 TRANSFERASE (AST) (SGOT): CPT

## 2023-09-02 PROCEDURE — 84460 ALANINE AMINO (ALT) (SGPT): CPT

## 2023-09-02 PROCEDURE — 71045 X-RAY EXAM CHEST 1 VIEW: CPT

## 2023-09-02 PROCEDURE — 84155 ASSAY OF PROTEIN SERUM: CPT

## 2023-09-02 PROCEDURE — 99285 EMERGENCY DEPT VISIT HI MDM: CPT

## 2023-09-02 PROCEDURE — 93005 ELECTROCARDIOGRAM TRACING: CPT | Performed by: STUDENT IN AN ORGANIZED HEALTH CARE EDUCATION/TRAINING PROGRAM

## 2023-09-02 PROCEDURE — 85025 COMPLETE CBC W/AUTO DIFF WBC: CPT

## 2023-09-02 PROCEDURE — 82247 BILIRUBIN TOTAL: CPT

## 2023-09-02 PROCEDURE — 83605 ASSAY OF LACTIC ACID: CPT

## 2023-09-02 PROCEDURE — 51798 US URINE CAPACITY MEASURE: CPT

## 2023-09-02 PROCEDURE — 84484 ASSAY OF TROPONIN QUANT: CPT

## 2023-09-02 PROCEDURE — 82140 ASSAY OF AMMONIA: CPT

## 2023-09-02 PROCEDURE — 6370000000 HC RX 637 (ALT 250 FOR IP): Performed by: STUDENT IN AN ORGANIZED HEALTH CARE EDUCATION/TRAINING PROGRAM

## 2023-09-02 PROCEDURE — 70450 CT HEAD/BRAIN W/O DYE: CPT

## 2023-09-02 PROCEDURE — 36415 COLL VENOUS BLD VENIPUNCTURE: CPT

## 2023-09-02 PROCEDURE — 83735 ASSAY OF MAGNESIUM: CPT

## 2023-09-02 PROCEDURE — 83690 ASSAY OF LIPASE: CPT

## 2023-09-02 PROCEDURE — 80048 BASIC METABOLIC PNL TOTAL CA: CPT

## 2023-09-02 PROCEDURE — 2580000003 HC RX 258: Performed by: STUDENT IN AN ORGANIZED HEALTH CARE EDUCATION/TRAINING PROGRAM

## 2023-09-02 RX ORDER — SODIUM CHLORIDE, SODIUM LACTATE, POTASSIUM CHLORIDE, AND CALCIUM CHLORIDE .6; .31; .03; .02 G/100ML; G/100ML; G/100ML; G/100ML
1000 INJECTION, SOLUTION INTRAVENOUS ONCE
Status: COMPLETED | OUTPATIENT
Start: 2023-09-02 | End: 2023-09-03

## 2023-09-02 RX ORDER — SODIUM CHLORIDE AND POTASSIUM CHLORIDE 150; 900 MG/100ML; MG/100ML
1000 INJECTION, SOLUTION INTRAVENOUS CONTINUOUS
Status: DISCONTINUED | OUTPATIENT
Start: 2023-09-02 | End: 2023-09-03

## 2023-09-02 RX ADMIN — SODIUM CHLORIDE, POTASSIUM CHLORIDE, SODIUM LACTATE AND CALCIUM CHLORIDE 1000 ML: 600; 310; 30; 20 INJECTION, SOLUTION INTRAVENOUS at 23:30

## 2023-09-02 RX ADMIN — POTASSIUM BICARBONATE 40 MEQ: 782 TABLET, EFFERVESCENT ORAL at 23:50

## 2023-09-02 ASSESSMENT — PAIN SCALES - GENERAL: PAINLEVEL_OUTOF10: 4

## 2023-09-02 ASSESSMENT — PAIN DESCRIPTION - PAIN TYPE: TYPE: CHRONIC PAIN

## 2023-09-02 ASSESSMENT — PAIN DESCRIPTION - LOCATION: LOCATION: NECK

## 2023-09-02 ASSESSMENT — PAIN DESCRIPTION - ORIENTATION: ORIENTATION: POSTERIOR

## 2023-09-02 ASSESSMENT — PAIN DESCRIPTION - DESCRIPTORS: DESCRIPTORS: ACHING

## 2023-09-02 ASSESSMENT — PAIN - FUNCTIONAL ASSESSMENT: PAIN_FUNCTIONAL_ASSESSMENT: 0-10

## 2023-09-03 PROBLEM — J18.9 COMMUNITY ACQUIRED PNEUMONIA OF RIGHT MIDDLE LOBE OF LUNG: Status: ACTIVE | Noted: 2023-09-03

## 2023-09-03 LAB
ALBUMIN SERPL-MCNC: 2.9 G/DL (ref 3.4–5)
ALBUMIN/GLOB SERPL: 1.2 {RATIO} (ref 1.1–2.2)
ALP SERPL-CCNC: 85 U/L (ref 40–129)
ALT SERPL-CCNC: <5 U/L (ref 10–40)
ANION GAP SERPL CALCULATED.3IONS-SCNC: 9 MMOL/L (ref 3–16)
AST SERPL-CCNC: 10 U/L (ref 15–37)
BASOPHILS # BLD: 0.1 K/UL (ref 0–0.2)
BASOPHILS NFR BLD: 0.6 %
BILIRUB SERPL-MCNC: <0.2 MG/DL (ref 0–1)
BUN SERPL-MCNC: 22 MG/DL (ref 7–20)
CALCIUM SERPL-MCNC: 8.3 MG/DL (ref 8.3–10.6)
CHLORIDE SERPL-SCNC: 95 MMOL/L (ref 99–110)
CO2 SERPL-SCNC: 27 MMOL/L (ref 21–32)
CREAT SERPL-MCNC: 1.7 MG/DL (ref 0.9–1.3)
DEPRECATED RDW RBC AUTO: 15 % (ref 12.4–15.4)
EKG ATRIAL RATE: 67 BPM
EKG DIAGNOSIS: NORMAL
EKG P AXIS: 54 DEGREES
EKG P-R INTERVAL: 222 MS
EKG Q-T INTERVAL: 452 MS
EKG QRS DURATION: 114 MS
EKG QTC CALCULATION (BAZETT): 477 MS
EKG R AXIS: -14 DEGREES
EKG T AXIS: 57 DEGREES
EKG VENTRICULAR RATE: 67 BPM
EOSINOPHIL # BLD: 0.2 K/UL (ref 0–0.6)
EOSINOPHIL NFR BLD: 1.3 %
GFR SERPLBLD CREATININE-BSD FMLA CKD-EPI: 49 ML/MIN/{1.73_M2}
GLUCOSE SERPL-MCNC: 78 MG/DL (ref 70–99)
HCT VFR BLD AUTO: 28.9 % (ref 40.5–52.5)
HGB BLD-MCNC: 9.8 G/DL (ref 13.5–17.5)
INR PPP: 1.1 (ref 0.84–1.16)
LYMPHOCYTES # BLD: 2.1 K/UL (ref 1–5.1)
LYMPHOCYTES NFR BLD: 17.4 %
MAGNESIUM SERPL-MCNC: 2 MG/DL (ref 1.8–2.4)
MCH RBC QN AUTO: 28 PG (ref 26–34)
MCHC RBC AUTO-ENTMCNC: 33.7 G/DL (ref 31–36)
MCV RBC AUTO: 83.1 FL (ref 80–100)
MONOCYTES # BLD: 0.9 K/UL (ref 0–1.3)
MONOCYTES NFR BLD: 7.7 %
NEUTROPHILS # BLD: 8.7 K/UL (ref 1.7–7.7)
NEUTROPHILS NFR BLD: 73 %
PLATELET # BLD AUTO: 259 K/UL (ref 135–450)
PMV BLD AUTO: 8 FL (ref 5–10.5)
POTASSIUM SERPL-SCNC: 3.4 MMOL/L (ref 3.5–5.1)
PROCALCITONIN SERPL IA-MCNC: 0.1 NG/ML (ref 0–0.15)
PROT SERPL-MCNC: 5.4 G/DL (ref 6.4–8.2)
PROTHROMBIN TIME: 14.2 SEC (ref 11.5–14.8)
RBC # BLD AUTO: 3.48 M/UL (ref 4.2–5.9)
SODIUM SERPL-SCNC: 131 MMOL/L (ref 136–145)
WBC # BLD AUTO: 11.9 K/UL (ref 4–11)

## 2023-09-03 PROCEDURE — 2580000003 HC RX 258: Performed by: INTERNAL MEDICINE

## 2023-09-03 PROCEDURE — 6360000002 HC RX W HCPCS: Performed by: STUDENT IN AN ORGANIZED HEALTH CARE EDUCATION/TRAINING PROGRAM

## 2023-09-03 PROCEDURE — 6370000000 HC RX 637 (ALT 250 FOR IP): Performed by: INTERNAL MEDICINE

## 2023-09-03 PROCEDURE — 85025 COMPLETE CBC W/AUTO DIFF WBC: CPT

## 2023-09-03 PROCEDURE — 84145 PROCALCITONIN (PCT): CPT

## 2023-09-03 PROCEDURE — 6370000000 HC RX 637 (ALT 250 FOR IP): Performed by: STUDENT IN AN ORGANIZED HEALTH CARE EDUCATION/TRAINING PROGRAM

## 2023-09-03 PROCEDURE — 80053 COMPREHEN METABOLIC PANEL: CPT

## 2023-09-03 PROCEDURE — 83735 ASSAY OF MAGNESIUM: CPT

## 2023-09-03 PROCEDURE — 85610 PROTHROMBIN TIME: CPT

## 2023-09-03 PROCEDURE — 1200000000 HC SEMI PRIVATE

## 2023-09-03 PROCEDURE — 2580000003 HC RX 258: Performed by: STUDENT IN AN ORGANIZED HEALTH CARE EDUCATION/TRAINING PROGRAM

## 2023-09-03 PROCEDURE — 93010 ELECTROCARDIOGRAM REPORT: CPT | Performed by: INTERNAL MEDICINE

## 2023-09-03 PROCEDURE — 96365 THER/PROPH/DIAG IV INF INIT: CPT

## 2023-09-03 PROCEDURE — 36415 COLL VENOUS BLD VENIPUNCTURE: CPT

## 2023-09-03 RX ORDER — PANTOPRAZOLE SODIUM 40 MG/1
40 TABLET, DELAYED RELEASE ORAL
Status: DISCONTINUED | OUTPATIENT
Start: 2023-09-03 | End: 2023-09-05 | Stop reason: HOSPADM

## 2023-09-03 RX ORDER — QUETIAPINE FUMARATE 200 MG/1
400 TABLET, FILM COATED ORAL NIGHTLY
Status: DISCONTINUED | OUTPATIENT
Start: 2023-09-03 | End: 2023-09-05 | Stop reason: HOSPADM

## 2023-09-03 RX ORDER — SODIUM CHLORIDE 0.9 % (FLUSH) 0.9 %
5-40 SYRINGE (ML) INJECTION PRN
Status: DISCONTINUED | OUTPATIENT
Start: 2023-09-03 | End: 2023-09-05 | Stop reason: HOSPADM

## 2023-09-03 RX ORDER — CLONIDINE HYDROCHLORIDE 0.1 MG/1
0.1 TABLET ORAL 2 TIMES DAILY
COMMUNITY

## 2023-09-03 RX ORDER — HYDROCHLOROTHIAZIDE 25 MG/1
25 TABLET ORAL DAILY
Status: DISCONTINUED | OUTPATIENT
Start: 2023-09-03 | End: 2023-09-04

## 2023-09-03 RX ORDER — MAGNESIUM SULFATE IN WATER 40 MG/ML
2000 INJECTION, SOLUTION INTRAVENOUS PRN
Status: DISCONTINUED | OUTPATIENT
Start: 2023-09-03 | End: 2023-09-05 | Stop reason: HOSPADM

## 2023-09-03 RX ORDER — SODIUM CHLORIDE 0.9 % (FLUSH) 0.9 %
5-40 SYRINGE (ML) INJECTION EVERY 12 HOURS SCHEDULED
Status: DISCONTINUED | OUTPATIENT
Start: 2023-09-03 | End: 2023-09-05 | Stop reason: HOSPADM

## 2023-09-03 RX ORDER — OXYCODONE HYDROCHLORIDE 5 MG/1
10 TABLET ORAL EVERY 6 HOURS PRN
Status: COMPLETED | OUTPATIENT
Start: 2023-09-03 | End: 2023-09-05

## 2023-09-03 RX ORDER — PRAVASTATIN SODIUM 40 MG
40 TABLET ORAL ONCE
Status: DISCONTINUED | OUTPATIENT
Start: 2023-09-03 | End: 2023-09-05

## 2023-09-03 RX ORDER — METHOCARBAMOL 500 MG/1
500 TABLET, FILM COATED ORAL 3 TIMES DAILY PRN
Status: ON HOLD | COMMUNITY
End: 2023-09-04

## 2023-09-03 RX ORDER — EZETIMIBE 10 MG/1
10 TABLET ORAL NIGHTLY
Status: DISCONTINUED | OUTPATIENT
Start: 2023-09-03 | End: 2023-09-05 | Stop reason: HOSPADM

## 2023-09-03 RX ORDER — CLONIDINE HYDROCHLORIDE 0.1 MG/1
0.1 TABLET ORAL 2 TIMES DAILY
Status: DISCONTINUED | OUTPATIENT
Start: 2023-09-03 | End: 2023-09-05 | Stop reason: HOSPADM

## 2023-09-03 RX ORDER — SODIUM CHLORIDE 9 MG/ML
INJECTION, SOLUTION INTRAVENOUS PRN
Status: DISCONTINUED | OUTPATIENT
Start: 2023-09-03 | End: 2023-09-05 | Stop reason: HOSPADM

## 2023-09-03 RX ORDER — SODIUM CHLORIDE AND POTASSIUM CHLORIDE 150; 900 MG/100ML; MG/100ML
1000 INJECTION, SOLUTION INTRAVENOUS CONTINUOUS
Status: DISCONTINUED | OUTPATIENT
Start: 2023-09-03 | End: 2023-09-03 | Stop reason: SDUPTHER

## 2023-09-03 RX ORDER — EZETIMIBE 10 MG/1
10 TABLET ORAL DAILY
COMMUNITY

## 2023-09-03 RX ORDER — EZETIMIBE AND SIMVASTATIN 10; 10 MG/1; MG/1
1 TABLET ORAL NIGHTLY
Status: DISCONTINUED | OUTPATIENT
Start: 2023-09-03 | End: 2023-09-03 | Stop reason: CLARIF

## 2023-09-03 RX ORDER — SODIUM CHLORIDE 9 MG/ML
INJECTION, SOLUTION INTRAVENOUS CONTINUOUS
Status: DISCONTINUED | OUTPATIENT
Start: 2023-09-03 | End: 2023-09-05

## 2023-09-03 RX ORDER — VENLAFAXINE 75 MG/1
75 TABLET ORAL DAILY
Status: ON HOLD | COMMUNITY
End: 2023-09-03 | Stop reason: CLARIF

## 2023-09-03 RX ORDER — NITROGLYCERIN 0.4 MG/1
0.4 TABLET SUBLINGUAL EVERY 5 MIN PRN
COMMUNITY

## 2023-09-03 RX ORDER — ONDANSETRON 4 MG/1
4 TABLET, ORALLY DISINTEGRATING ORAL EVERY 8 HOURS PRN
Status: DISCONTINUED | OUTPATIENT
Start: 2023-09-03 | End: 2023-09-05 | Stop reason: HOSPADM

## 2023-09-03 RX ORDER — POLYETHYLENE GLYCOL 3350 17 G/17G
17 POWDER, FOR SOLUTION ORAL DAILY PRN
Status: DISCONTINUED | OUTPATIENT
Start: 2023-09-03 | End: 2023-09-05 | Stop reason: HOSPADM

## 2023-09-03 RX ORDER — SODIUM CHLORIDE AND POTASSIUM CHLORIDE 150; 900 MG/100ML; MG/100ML
1000 INJECTION, SOLUTION INTRAVENOUS CONTINUOUS
Status: DISPENSED | OUTPATIENT
Start: 2023-09-03 | End: 2023-09-03

## 2023-09-03 RX ORDER — AMITRIPTYLINE HYDROCHLORIDE 25 MG/1
25 TABLET, FILM COATED ORAL NIGHTLY
COMMUNITY

## 2023-09-03 RX ORDER — NICOTINE 21 MG/24HR
1 PATCH, TRANSDERMAL 24 HOURS TRANSDERMAL DAILY
Status: DISCONTINUED | OUTPATIENT
Start: 2023-09-03 | End: 2023-09-03

## 2023-09-03 RX ORDER — VENLAFAXINE HYDROCHLORIDE 75 MG/1
75 CAPSULE, EXTENDED RELEASE ORAL DAILY
Status: DISCONTINUED | OUTPATIENT
Start: 2023-09-03 | End: 2023-09-05 | Stop reason: HOSPADM

## 2023-09-03 RX ORDER — ASPIRIN 81 MG/1
81 TABLET, CHEWABLE ORAL DAILY
Status: DISCONTINUED | OUTPATIENT
Start: 2023-09-03 | End: 2023-09-05 | Stop reason: HOSPADM

## 2023-09-03 RX ORDER — POTASSIUM CHLORIDE 7.45 MG/ML
10 INJECTION INTRAVENOUS PRN
Status: DISCONTINUED | OUTPATIENT
Start: 2023-09-03 | End: 2023-09-05 | Stop reason: HOSPADM

## 2023-09-03 RX ORDER — EZETIMIBE AND SIMVASTATIN 10; 10 MG/1; MG/1
1 TABLET ORAL NIGHTLY
Status: ON HOLD | COMMUNITY
End: 2023-09-03 | Stop reason: CLARIF

## 2023-09-03 RX ORDER — ACETAMINOPHEN 325 MG/1
650 TABLET ORAL EVERY 6 HOURS PRN
Status: DISCONTINUED | OUTPATIENT
Start: 2023-09-03 | End: 2023-09-05 | Stop reason: HOSPADM

## 2023-09-03 RX ORDER — QUETIAPINE FUMARATE 200 MG/1
400 TABLET, FILM COATED ORAL ONCE
Status: DISCONTINUED | OUTPATIENT
Start: 2023-09-03 | End: 2023-09-05

## 2023-09-03 RX ORDER — ENOXAPARIN SODIUM 100 MG/ML
40 INJECTION SUBCUTANEOUS EVERY EVENING
Status: DISCONTINUED | OUTPATIENT
Start: 2023-09-04 | End: 2023-09-05 | Stop reason: HOSPADM

## 2023-09-03 RX ORDER — QUETIAPINE FUMARATE 200 MG/1
400 TABLET, FILM COATED ORAL 2 TIMES DAILY
COMMUNITY

## 2023-09-03 RX ORDER — NICOTINE 21 MG/24HR
1 PATCH, TRANSDERMAL 24 HOURS TRANSDERMAL DAILY
Status: DISCONTINUED | OUTPATIENT
Start: 2023-09-03 | End: 2023-09-05 | Stop reason: HOSPADM

## 2023-09-03 RX ORDER — FERROUS SULFATE 325(65) MG
325 TABLET ORAL
Status: DISCONTINUED | OUTPATIENT
Start: 2023-09-03 | End: 2023-09-05 | Stop reason: HOSPADM

## 2023-09-03 RX ORDER — ALLOPURINOL 100 MG/1
200 TABLET ORAL DAILY
Status: DISCONTINUED | OUTPATIENT
Start: 2023-09-03 | End: 2023-09-05 | Stop reason: HOSPADM

## 2023-09-03 RX ORDER — CARVEDILOL 3.12 MG/1
3.12 TABLET ORAL 2 TIMES DAILY WITH MEALS
Status: DISCONTINUED | OUTPATIENT
Start: 2023-09-03 | End: 2023-09-05 | Stop reason: HOSPADM

## 2023-09-03 RX ORDER — CLOPIDOGREL BISULFATE 75 MG/1
75 TABLET ORAL DAILY
Status: DISCONTINUED | OUTPATIENT
Start: 2023-09-03 | End: 2023-09-05 | Stop reason: HOSPADM

## 2023-09-03 RX ORDER — NALOXONE HYDROCHLORIDE 4 MG/.1ML
1 SPRAY NASAL PRN
COMMUNITY

## 2023-09-03 RX ORDER — AMITRIPTYLINE HYDROCHLORIDE 25 MG/1
25 TABLET, FILM COATED ORAL NIGHTLY
Status: DISCONTINUED | OUTPATIENT
Start: 2023-09-03 | End: 2023-09-05 | Stop reason: HOSPADM

## 2023-09-03 RX ORDER — ACETAMINOPHEN 650 MG/1
650 SUPPOSITORY RECTAL EVERY 6 HOURS PRN
Status: DISCONTINUED | OUTPATIENT
Start: 2023-09-03 | End: 2023-09-05 | Stop reason: HOSPADM

## 2023-09-03 RX ORDER — VENLAFAXINE HYDROCHLORIDE 75 MG/1
75 CAPSULE, EXTENDED RELEASE ORAL DAILY
COMMUNITY

## 2023-09-03 RX ORDER — POTASSIUM CHLORIDE 20 MEQ/1
40 TABLET, EXTENDED RELEASE ORAL ONCE
Status: COMPLETED | OUTPATIENT
Start: 2023-09-03 | End: 2023-09-03

## 2023-09-03 RX ORDER — ONDANSETRON 2 MG/ML
4 INJECTION INTRAMUSCULAR; INTRAVENOUS EVERY 6 HOURS PRN
Status: DISCONTINUED | OUTPATIENT
Start: 2023-09-03 | End: 2023-09-05 | Stop reason: HOSPADM

## 2023-09-03 RX ORDER — PRAVASTATIN SODIUM 40 MG
40 TABLET ORAL NIGHTLY
Status: DISCONTINUED | OUTPATIENT
Start: 2023-09-03 | End: 2023-09-05 | Stop reason: HOSPADM

## 2023-09-03 RX ORDER — AMITRIPTYLINE HYDROCHLORIDE 25 MG/1
25 TABLET, FILM COATED ORAL ONCE
Status: DISCONTINUED | OUTPATIENT
Start: 2023-09-03 | End: 2023-09-05

## 2023-09-03 RX ORDER — EZETIMIBE 10 MG/1
10 TABLET ORAL ONCE
Status: DISCONTINUED | OUTPATIENT
Start: 2023-09-03 | End: 2023-09-05

## 2023-09-03 RX ADMIN — PRAVASTATIN SODIUM 40 MG: 40 TABLET ORAL at 20:15

## 2023-09-03 RX ADMIN — ASPIRIN 81 MG: 81 TABLET, CHEWABLE ORAL at 07:48

## 2023-09-03 RX ADMIN — EZETIMIBE 10 MG: 10 TABLET ORAL at 20:15

## 2023-09-03 RX ADMIN — FERROUS SULFATE TAB 325 MG (65 MG ELEMENTAL FE) 325 MG: 325 (65 FE) TAB at 07:49

## 2023-09-03 RX ADMIN — ONDANSETRON 4 MG: 2 INJECTION INTRAMUSCULAR; INTRAVENOUS at 07:49

## 2023-09-03 RX ADMIN — ALLOPURINOL 200 MG: 100 TABLET ORAL at 07:48

## 2023-09-03 RX ADMIN — VENLAFAXINE HYDROCHLORIDE 75 MG: 75 CAPSULE, EXTENDED RELEASE ORAL at 07:48

## 2023-09-03 RX ADMIN — SODIUM CHLORIDE: 9 INJECTION, SOLUTION INTRAVENOUS at 20:08

## 2023-09-03 RX ADMIN — AZITHROMYCIN MONOHYDRATE 500 MG: 500 INJECTION, POWDER, LYOPHILIZED, FOR SOLUTION INTRAVENOUS at 01:19

## 2023-09-03 RX ADMIN — QUETIAPINE FUMARATE 400 MG: 200 TABLET ORAL at 20:15

## 2023-09-03 RX ADMIN — PANTOPRAZOLE SODIUM 40 MG: 40 TABLET, DELAYED RELEASE ORAL at 07:48

## 2023-09-03 RX ADMIN — SERTRALINE HYDROCHLORIDE 50 MG: 50 TABLET ORAL at 07:48

## 2023-09-03 RX ADMIN — POTASSIUM CHLORIDE AND SODIUM CHLORIDE 1000 ML: 900; 150 INJECTION, SOLUTION INTRAVENOUS at 02:26

## 2023-09-03 RX ADMIN — SODIUM CHLORIDE: 9 INJECTION, SOLUTION INTRAVENOUS at 10:27

## 2023-09-03 RX ADMIN — AMITRIPTYLINE HYDROCHLORIDE 25 MG: 25 TABLET, FILM COATED ORAL at 20:15

## 2023-09-03 RX ADMIN — POTASSIUM CHLORIDE 40 MEQ: 1500 TABLET, EXTENDED RELEASE ORAL at 15:50

## 2023-09-03 RX ADMIN — OXYCODONE HYDROCHLORIDE 10 MG: 5 TABLET ORAL at 15:50

## 2023-09-03 RX ADMIN — CLOPIDOGREL BISULFATE 75 MG: 75 TABLET ORAL at 07:48

## 2023-09-03 RX ADMIN — POTASSIUM CHLORIDE AND SODIUM CHLORIDE 1000 ML: 900; 150 INJECTION, SOLUTION INTRAVENOUS at 00:03

## 2023-09-03 RX ADMIN — CEFTRIAXONE SODIUM 1000 MG: 1 INJECTION, POWDER, FOR SOLUTION INTRAMUSCULAR; INTRAVENOUS at 23:54

## 2023-09-03 RX ADMIN — CEFTRIAXONE SODIUM 1000 MG: 1 INJECTION, POWDER, FOR SOLUTION INTRAMUSCULAR; INTRAVENOUS at 00:38

## 2023-09-03 ASSESSMENT — PAIN SCALES - GENERAL: PAINLEVEL_OUTOF10: 6

## 2023-09-03 ASSESSMENT — PAIN DESCRIPTION - LOCATION: LOCATION: NECK

## 2023-09-03 ASSESSMENT — PAIN - FUNCTIONAL ASSESSMENT: PAIN_FUNCTIONAL_ASSESSMENT: PREVENTS OR INTERFERES SOME ACTIVE ACTIVITIES AND ADLS

## 2023-09-03 ASSESSMENT — PAIN DESCRIPTION - DESCRIPTORS: DESCRIPTORS: ACHING;DISCOMFORT

## 2023-09-03 ASSESSMENT — PAIN DESCRIPTION - PAIN TYPE: TYPE: ACUTE PAIN

## 2023-09-03 ASSESSMENT — PAIN DESCRIPTION - ORIENTATION: ORIENTATION: MID

## 2023-09-03 NOTE — PROGRESS NOTES
Charisma Mckeon RN PCU Shift Lead attempted to consult neurology per [facility] but it says there's no coverage for [facility] at this time. Bedside nursing handoff to RAISA See including unable to consult neurology at this time.   Samia Hendricks RN

## 2023-09-03 NOTE — PROGRESS NOTES
Telemetry Assignment Communication Form    Patient has orders for continuous telemetry OR pulse oximeter only orders    To be filled out by Clinical    Patient has Admission or Transfer orders and is assigned to Room Number: 325    Continuous Telemetry:  Yes       WITH/WITHOUT: without Continuous pulse ox    Patient has pulse ox ONLY orders:  No    (Once this top section is completed:  Select \"Route\" and send note to Fax number: [phone] ))      ___________________________________________________________________________      To be filled out by Marichuy Massey    Patient assigned to tele box number: __________________               (to be written in by Marichuy Massey when telemetry box is assigned to patient)    ___________________________________________________________________________      Bedside RN confirming that the box listed above is in fact the telemetry box number being placed on the patient listed above.         X________________________________________ RN signature        __________________________________________RN assigned to Patient (please print)    _______________ Date    ____________ Time

## 2023-09-03 NOTE — PROGRESS NOTES
Mohs Case Number:  Patient admitted from ER to -2. VSS. Patient alert to self. Wife at the bedside for admission questions. Orientation provided. Patient is not able to demonstrated the ability to move from a reclining position to an upright position within the recliner. Patient is confused, demented and /or unable to follow instruction. Call in easy reach. Bed alarm on for safety. Continue to monitor closely.   Shantell Orourke RN

## 2023-09-03 NOTE — H&P
History and Physical      Name:  Blanca Aguila /Age/Sex: 1975  (52 y.o. male)   MRN & CSN:  [id number] & [id number] Encounter Date/Time: 9/3/2023 12:54 AM EDT   Location:   PCP: Devin Clay, [address] [place] [address] Dr Day: 2    Assessment and Plan:     Patient is a 59-year-old male who presented with generalized weakness. # Community-acquire pneumonia of right middle lung  - Endorsed worsening SOB with non-productive cough for 2 days. No travels or sick contacts. - On RA. Afebrile, mild leukocytosis of 12, LA normal. CXR suspicious for infiltrates in RML.   - Started on Rocephin/Azithro in the ED, continue. Follow-up cultures and inflammatory markers. # KJ  - No recent NSAIDs. Having decreased PO over past few days. - Cr 2.1 on admission, baseline ~ 1.0. UA with elevated SG.  - Held home HCTZ. Will challenge with IVF. Strict I/O's. Slade placed in ED for retention, TOV in 1-2 days. # Acute metabolic encephalopathy  - Wife reported increased confusion being withdrawn over the past few days. No recent changes in medications.  - A&Ox2-3, inattentive. CTH nonacute. - Likely in setting of above. Continue delirium precautions. Neurology consulted for evaluation, appreciate assistance. # Hypokalemia  - No emesis or diarrhea. On HCTZ. - Mild, repleted. - Follow-up repeat labs. Hold HCTZ pending repeat labs. # Hyponatremia  - Having decreased PO intake. - Clinically hypovolemic.  - Follow-up repeat labs after IVF. # Anxiety / PTSD / depression  - Followed by Psychiatry outpatient. - Continue home psychotropic medications. - Family concerned about possible parkinsonism features, not clearly present on exam, family requesting Neurology input, appreciate assistance. # Non-obstructive CAD  - Continue DAPT, statin and Zetia per Cardiology. # Essential hypretension  - Hold home Coreg, HCTZ and Clonidine in setting of borderline hypotension.     # Generalized weakness  - Had

## 2023-09-03 NOTE — PROGRESS NOTES
4 Eyes Skin Assessment     NAME:  Hudson Orellana  YOB: 1975  MEDICAL RECORD NUMBER:  [id number]    The patient is being assessed for  Admission    I agree that at least one RN has performed a thorough Head to Toe Skin Assessment on the patient. ALL assessment sites listed below have been assessed. Areas assessed by both nurses:    Head, Face, Ears, Shoulders, Back, Chest, Arms, Elbows, Hands, Sacrum. Buttock, Coccyx, Ischium, and Legs. Feet and Heels        Scattered bruises, abrasions, & scratches noted on admission. Wife reports frequent falls. Does the Patient have a Wound?  No noted wound(s)       Rolo Prevention initiated by RN: Yes  Wound Care Orders initiated by RN: No    Pressure Injury (Stage 3,4, Unstageable, DTI, NWPT, and Complex wounds) if present, place Wound referral order by RN under : No    New Ostomies, if present place, Ostomy referral order under : No     Nurse 1 eSignature: Electronically signed by Marcela Santos RN on 9/3/23 at 2:51 AM EDT    **SHARE this note so that the co-signing nurse can place an eSignature**    Nurse 2 eSignature: Electronically signed by Agustin Mora RN on 9/3/23 at 4:08 AM EDT

## 2023-09-03 NOTE — PROGRESS NOTES
Bedside report and transfer of care given to [facility]. Pt currently resting in bed with the call light within reach. Pt denies any other care needs at this time. Pt stable at this time.

## 2023-09-03 NOTE — PROGRESS NOTES
Patient complaints of pain. Patient is hypotensive and unable to have controlled pain medication. Writer offered tylenol but patient refused.

## 2023-09-03 NOTE — PLAN OF CARE
Problem: Pain  Goal: Verbalizes/displays adequate comfort level or baseline comfort level  Outcome: Progressing     Problem: Discharge Planning  Goal: Discharge to home or other facility with appropriate resources  Outcome: Progressing  Flowsheets (Taken 9/3/2023 4830)  Discharge to home or other facility with appropriate resources: Identify barriers to discharge with patient and caregiver

## 2023-09-03 NOTE — PROGRESS NOTES
Comprehensive Nutrition Assessment    Type and Reason for Visit:  Initial, Positive Nutrition Screen (+ screen for MST = 3)    Nutrition Recommendations/Plan:   Modified diet order to ADULT DIET; Regular diet order. Monitor appetite and po intake. Monitor nutrition-related labs, bowel function, and weight trends. Malnutrition Assessment:  Malnutrition Status: At risk for malnutrition (09/03/23 1254)    Context:  Acute Illness     Findings of the 6 clinical characteristics of malnutrition:  Energy Intake:  50% or less of estimated energy requirements for 5 or more days  Weight Loss:  Unable to assess     Body Fat Loss:  No significant body fat loss     Muscle Mass Loss:  No significant muscle mass loss    Fluid Accumulation:  No significant fluid accumulation     Strength:  Not Performed    Nutrition Assessment:    patient is nutritionally compromised AEB decreased appetite and po intake r/t fatigue + weakness PTA and he is at risk for further compromise d/t decreased appetite/po intake during this admission and altered nutrition-related labs; will modify diet order to ADULT DIET; Regular diet order and monitor nutrition status    Nutrition Related Findings:    patient is A & O x 2; patient presented to ED with c/o fatigue and weakness x 2 weeks PTA and patient stated that he fell a few times at home as well; patient consumed 0% breakfast this am; BM was PTA - no BM during this admission; patient's wife reported increased confusion PTA Wound Type: None       Current Nutrition Intake & Therapies:    Average Meal Intake: 0% (breakfast this am)  Average Supplements Intake: None Ordered  ADULT DIET; Regular    Anthropometric Measures:  Height: 6' (182.9 cm)  Ideal Body Weight (IBW): 178 lbs (81 kg)    Admission Body Weight: 201 lb 4.8 oz (91.3 kg) (obtained on 9/3/23; actual weight)  Current Body Weight: 201 lb 4.8 oz (91.3 kg) (obtained on 9/3/23; actual weight), 113.1 % IBW.  Weight Source: Bed Scale  Current BMI (kg/m2): 27.3  Usual Body Weight:  (unable to assess d/t lack of actual weight hx in EMR)     Weight Adjustment For: No Adjustment                 BMI Categories: Overweight (BMI 25.0-29. 9)    Estimated Daily Nutrient Needs:  Energy Requirements Based On: Kcal/kg  Weight Used for Energy Requirements: Current  Energy (kcal/day): 1820 - 2093 kcals based on 20-23 kcals/kg/CBW  Weight Used for Protein Requirements: Current  Protein (g/day): 109 - 127 g protein based on 1.2-1.4 g/kg/CBW  Method Used for Fluid Requirements: 1 ml/kcal  Fluid (ml/day): 1820 - 2093 ml    Nutrition Diagnosis:   Inadequate oral intake related to inadequate protein-energy intake, psychological cause or life stress as evidenced by poor intake prior to admission, intake 0-25%, lab values    Nutrition Interventions:   Food and/or Nutrient Delivery: Modify Current Diet  Nutrition Education/Counseling: No recommendation at this time  Coordination of Nutrition Care: Continue to monitor while inpatient, Coordination of Care       Goals:     Goals: Meet at least 75% of estimated needs, by next RD assessment       Nutrition Monitoring and Evaluation:   Behavioral-Environmental Outcomes: None Identified  Food/Nutrient Intake Outcomes: Food and Nutrient Intake  Physical Signs/Symptoms Outcomes: Biochemical Data, Meal Time Behavior, Nutrition Focused Physical Findings, Skin, Weight    Discharge Planning:    Continue current diet     Junie Muñoz, [id number] [place] [address], [address] [place] [address]  Contact: [phone]

## 2023-09-03 NOTE — ED PROVIDER NOTES
pneumonia we will start antibiotics. Hemoglobin is 9.7 which does not require transfusion however is significantly lower than his baseline no reported bleeding unclear etiology. He also has an acute kidney injury we are continuing IV fluids for this. I anticipate he will require admission for altered mental status electrolyte abnormalities and acute kidney injury. [NG]      ED Course User Index  [NG] Zuri Wills MD       Additional Reassessment    Is this patient to be included in the SEP-1 core measure? No Exclusion criteria - the patient is NOT to be included for SEP-1 Core Measure due to: May have criteria for sepsis, but does not meet criteria for severe sepsis or septic shock    Medical Decision Making  Presentation for altered mental status will admit for acute kidney injury pneumonia and altered mental status. Problems Addressed:  KJ (acute kidney injury) ([address]): acute illness or injury  Altered mental status, unspecified altered mental status type: acute illness or injury  General weakness: acute illness or injury  Pneumonia due to infectious organism, unspecified laterality, unspecified part of lung: acute illness or injury    Amount and/or Complexity of Data Reviewed  Independent Historian: spouse  Labs: ordered. Decision-making details documented in ED Course. Radiology: ordered. Decision-making details documented in ED Course. ECG/medicine tests: ordered. Decision-making details documented in ED Course. Risk  Prescription drug management. Decision regarding hospitalization.           The patient was given the followingmedications:  Orders Placed This Encounter   Medications    lactated ringers bolus bolus 1,000 mL    potassium bicarb-citric acid (EFFER-K) effervescent tablet 40 mEq    0.9% NaCl with KCl 20 mEq infusion    cefTRIAXone (ROCEPHIN) 1,000 mg in sodium chloride 0.9 % 50 mL IVPB (mini-bag)     Order Specific Question:   Antimicrobial Indications     Answer:   Pneumonia (CAP) azithromycin (ZITHROMAX) 500 mg in 250 mL addavial     Order Specific Question:   Antimicrobial Indications     Answer:   Pneumonia (CAP)       CONSULTS:  None      CRITICAL CARE TIME   Total Critical Care time was 0 minutes, excluding separately reportable procedures. There was a high probability of clinically significant/life threatening deterioration in the patient's condition which required my urgent intervention. Clinical Impression     1. General weakness    2. Altered mental status, unspecified altered mental status type    3. Pneumonia due to infectious organism, unspecified laterality, unspecified part of lung    4. KJ (acute kidney injury) ([address])        Disposition     PATIENT REFERRED TO:  No follow-up provider specified.     DISCHARGE MEDICATIONS:  New Prescriptions    No medications on file       DISPOSITION Decision To Admit 09/03/2023 12:24:08 AM      Scar Cerda MD (electronically signed)  Attending Emergency Physician             Maria Eugenia Ch MD  09/03/23 [id number]
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
## 2023-09-04 PROBLEM — R53.1 GENERAL WEAKNESS: Status: ACTIVE | Noted: 2020-06-12

## 2023-09-04 PROBLEM — G93.41 METABOLIC ENCEPHALOPATHY: Status: ACTIVE | Noted: 2023-09-04

## 2023-09-04 LAB
ANION GAP SERPL CALCULATED.3IONS-SCNC: 7 MMOL/L (ref 3–16)
BUN SERPL-MCNC: 13 MG/DL (ref 7–20)
CALCIUM SERPL-MCNC: 8.6 MG/DL (ref 8.3–10.6)
CHLORIDE SERPL-SCNC: 105 MMOL/L (ref 99–110)
CO2 SERPL-SCNC: 27 MMOL/L (ref 21–32)
CREAT SERPL-MCNC: 1 MG/DL (ref 0.9–1.3)
GFR SERPLBLD CREATININE-BSD FMLA CKD-EPI: >60 ML/MIN/{1.73_M2}
GLUCOSE SERPL-MCNC: 88 MG/DL (ref 70–99)
POTASSIUM SERPL-SCNC: 4.4 MMOL/L (ref 3.5–5.1)
SODIUM SERPL-SCNC: 139 MMOL/L (ref 136–145)

## 2023-09-04 PROCEDURE — 2580000003 HC RX 258: Performed by: STUDENT IN AN ORGANIZED HEALTH CARE EDUCATION/TRAINING PROGRAM

## 2023-09-04 PROCEDURE — 97535 SELF CARE MNGMENT TRAINING: CPT

## 2023-09-04 PROCEDURE — 99233 SBSQ HOSP IP/OBS HIGH 50: CPT | Performed by: INTERNAL MEDICINE

## 2023-09-04 PROCEDURE — 82607 VITAMIN B-12: CPT

## 2023-09-04 PROCEDURE — 84425 ASSAY OF VITAMIN B-1: CPT

## 2023-09-04 PROCEDURE — 97165 OT EVAL LOW COMPLEX 30 MIN: CPT

## 2023-09-04 PROCEDURE — 6360000002 HC RX W HCPCS: Performed by: STUDENT IN AN ORGANIZED HEALTH CARE EDUCATION/TRAINING PROGRAM

## 2023-09-04 PROCEDURE — 1200000000 HC SEMI PRIVATE

## 2023-09-04 PROCEDURE — 99223 1ST HOSP IP/OBS HIGH 75: CPT | Performed by: PSYCHIATRY & NEUROLOGY

## 2023-09-04 PROCEDURE — 80048 BASIC METABOLIC PNL TOTAL CA: CPT

## 2023-09-04 PROCEDURE — 97161 PT EVAL LOW COMPLEX 20 MIN: CPT

## 2023-09-04 PROCEDURE — 97530 THERAPEUTIC ACTIVITIES: CPT

## 2023-09-04 PROCEDURE — 6370000000 HC RX 637 (ALT 250 FOR IP): Performed by: STUDENT IN AN ORGANIZED HEALTH CARE EDUCATION/TRAINING PROGRAM

## 2023-09-04 PROCEDURE — 6370000000 HC RX 637 (ALT 250 FOR IP): Performed by: INTERNAL MEDICINE

## 2023-09-04 PROCEDURE — 2580000003 HC RX 258: Performed by: INTERNAL MEDICINE

## 2023-09-04 PROCEDURE — 36415 COLL VENOUS BLD VENIPUNCTURE: CPT

## 2023-09-04 PROCEDURE — 82746 ASSAY OF FOLIC ACID SERUM: CPT

## 2023-09-04 PROCEDURE — 97116 GAIT TRAINING THERAPY: CPT

## 2023-09-04 RX ORDER — PROPRANOLOL HYDROCHLORIDE 10 MG/1
10 TABLET ORAL 2 TIMES DAILY
COMMUNITY

## 2023-09-04 RX ORDER — TAMSULOSIN HYDROCHLORIDE 0.4 MG/1
0.4 CAPSULE ORAL DAILY
Status: DISCONTINUED | OUTPATIENT
Start: 2023-09-04 | End: 2023-09-05 | Stop reason: HOSPADM

## 2023-09-04 RX ADMIN — SODIUM CHLORIDE: 9 INJECTION, SOLUTION INTRAVENOUS at 01:27

## 2023-09-04 RX ADMIN — PANTOPRAZOLE SODIUM 40 MG: 40 TABLET, DELAYED RELEASE ORAL at 08:13

## 2023-09-04 RX ADMIN — AZITHROMYCIN MONOHYDRATE 500 MG: 500 INJECTION, POWDER, LYOPHILIZED, FOR SOLUTION INTRAVENOUS at 01:30

## 2023-09-04 RX ADMIN — PRAVASTATIN SODIUM 40 MG: 40 TABLET ORAL at 21:20

## 2023-09-04 RX ADMIN — OXYCODONE HYDROCHLORIDE 10 MG: 5 TABLET ORAL at 00:03

## 2023-09-04 RX ADMIN — FERROUS SULFATE TAB 325 MG (65 MG ELEMENTAL FE) 325 MG: 325 (65 FE) TAB at 08:13

## 2023-09-04 RX ADMIN — Medication 10 ML: at 21:21

## 2023-09-04 RX ADMIN — AMITRIPTYLINE HYDROCHLORIDE 25 MG: 25 TABLET, FILM COATED ORAL at 21:20

## 2023-09-04 RX ADMIN — ASPIRIN 81 MG: 81 TABLET, CHEWABLE ORAL at 08:12

## 2023-09-04 RX ADMIN — ALLOPURINOL 200 MG: 100 TABLET ORAL at 08:13

## 2023-09-04 RX ADMIN — ENOXAPARIN SODIUM 40 MG: 100 INJECTION SUBCUTANEOUS at 16:31

## 2023-09-04 RX ADMIN — QUETIAPINE FUMARATE 400 MG: 200 TABLET ORAL at 21:20

## 2023-09-04 RX ADMIN — EZETIMIBE 10 MG: 10 TABLET ORAL at 21:20

## 2023-09-04 RX ADMIN — CLOPIDOGREL BISULFATE 75 MG: 75 TABLET ORAL at 08:13

## 2023-09-04 RX ADMIN — OXYCODONE HYDROCHLORIDE 10 MG: 5 TABLET ORAL at 15:52

## 2023-09-04 RX ADMIN — TAMSULOSIN HYDROCHLORIDE 0.4 MG: 0.4 CAPSULE ORAL at 16:31

## 2023-09-04 RX ADMIN — VENLAFAXINE HYDROCHLORIDE 75 MG: 75 CAPSULE, EXTENDED RELEASE ORAL at 08:13

## 2023-09-04 RX ADMIN — OXYCODONE HYDROCHLORIDE 10 MG: 5 TABLET ORAL at 08:11

## 2023-09-04 ASSESSMENT — PAIN - FUNCTIONAL ASSESSMENT
PAIN_FUNCTIONAL_ASSESSMENT: PREVENTS OR INTERFERES SOME ACTIVE ACTIVITIES AND ADLS

## 2023-09-04 ASSESSMENT — PAIN DESCRIPTION - ONSET: ONSET: ON-GOING

## 2023-09-04 ASSESSMENT — PAIN DESCRIPTION - PAIN TYPE
TYPE: ACUTE PAIN
TYPE: CHRONIC PAIN

## 2023-09-04 ASSESSMENT — PAIN DESCRIPTION - LOCATION
LOCATION: NECK

## 2023-09-04 ASSESSMENT — PAIN DESCRIPTION - DESCRIPTORS
DESCRIPTORS: ACHING
DESCRIPTORS: ACHING;DISCOMFORT
DESCRIPTORS: ACHING;DISCOMFORT

## 2023-09-04 ASSESSMENT — PAIN DESCRIPTION - FREQUENCY: FREQUENCY: CONTINUOUS

## 2023-09-04 ASSESSMENT — PAIN DESCRIPTION - ORIENTATION
ORIENTATION: MID

## 2023-09-04 ASSESSMENT — PAIN SCALES - GENERAL
PAINLEVEL_OUTOF10: 7
PAINLEVEL_OUTOF10: 7
PAINLEVEL_OUTOF10: 8

## 2023-09-04 NOTE — PROGRESS NOTES
significant history. Wife reported increased confusion being withdrawn over the past few days. No recent changes in medications. Endorsed worsening SOB with non-productive cough for 2 days. No travels or sick contacts. No CP, nausea, vomiting, abdominal pain, constipation, diarrhea or other complaints. Smokes 1 PPD over past 30 years. No alcohol or illicit drug use. Home Health S4 Level Recommendation:  NA    AM-PAC Mobility Score    AM-PAC Inpatient Mobility Raw Score : 20       Subjective  Patient lying reclined in bed with no family present. Pt agreeable to this PT session. Cognition    A&O x4   Able to follow 2 step commands    Pain   Yes  Location: neck  Ratin /10  Pain Medicine Status: No request made    Preadmission Environment:   Pt. Lives                                              Spouse (wife works during the day)  Home environment:                            apartment   Steps to enter first floor:                     2-3 steps to enter with HR  Laundry:                                              1st floor  Bathroom:                                           walk in shower  Pt sleeps in a:                                     Flat bed  Equipment owned:                                   Preadmission Status:  Pt. Able to drive:                                 No  Pt. Fully independent with ADLs:       Yes  Pt. Required assistance for:               Cleaning, Cooking, Laundry , and grocery shopping  Pt. independent for functional transfers and utilized No Device for mobility in home and No Device out in community  History of falls:                                                Yes -due to light [id number]:                       None    Objective  Does this pt have an acute or acute on chronic diagnosis of CHF? No    Upper Extremity ROM/Strength  Please see OT evaluation.       Lower Extremity ROM / Strength   AROM WFL: Yes  ROM limitations:     BLE strength WFL, but not mobility. Assessment  Pt seen today for physical therapy Evaluation & Treatment. Pt demonstrated decreased Activity tolerance, Balance, Safety, and Strength as well as decreased independence with Ambulation, Bed Mobility , and Transfers. Recommending Home 24 hr assist and OP PT upon discharge as patient functioning close to baseline level and would benefit from continued therapy services    Goals : To be met in 3 visits:  1). Independent with LE Ex x 10 reps  2). Sit to/from stand: Independent  3). Bed to chair: Independent    To be met in 6 visits:  1). Supine to/from sit: Independent  2). Sit to/from stand: Modified Independent  3). Bed to chair: Modified Independent  4). Gait: Ambulate 150 ft.  with Modified Independent and use of LRAD (least restrictive assistive device)  5). Tolerate B LE exercises 3 sets of 10-15 reps  6). Ascend/descend 3 steps with Supervision with use of hand rail unilateral and No AD    Rehabilitation Potential: Good  Strengths for achieving goals include:   Pt motivated, PLOF, Family Support, and Pt cooperative   Barriers to achieving goals include:    No Barriers    Plan    To be seen 3-5 x / week  while in acute care setting for therapeutic exercises, bed mobility, transfers, progressive gait training, balance training, and family/patient education. Signature: Dyan Valadez PT     If patient discharges from this facility prior to next visit, this note will serve as the Discharge Summary.

## 2023-09-04 NOTE — CONSULTS
Neurology consultation note    Patient name: Brendan Walter      Chief Complaint:  Encephalopathy. History of present illness: This is a 52years old right-handed male. The patient was admitted last night due to shortness of breath, and KJ. Upon admission, the patient was found to have CAP. Per family, the patient was reported memory difficulty which started about 5 years ago. The course is slow progressive. The patient also complained tremor on both hands. The patient also had recent cervical decompression surgery in June. His wife feels the patient has been worse on his mentation after the surgery. The patient is noted for history of alcohol dependence 10 years. The patient has been sober for more than 4 years. Unfortunately, his memory difficulty has not been improved after stop drinking. The patient denies significant memory difficulty during my assessment. The patient states he would like to go home. Past medical history:    Past Medical History:   Diagnosis Date    Anxiety     CAD (coronary artery disease)     COPD (chronic obstructive pulmonary disease) ([address])     Depression     Gout     both knees and feet    Hyperlipidemia     Insomnia     NSTEMI (non-ST elevated myocardial infarction) ([address]) 12/30/2018    Panic disorder     Positive urine drug screen 12/2018    opiate, cocaine, benzos    PTSD (post-traumatic stress disorder)     Reflux     Scoliosis     Traumatic rupture of spleen     Wears glasses        Past surgical history:    Past Surgical History:   Procedure Laterality Date    CERVICAL SPINE SURGERY  07/10/2023    cervical spine fusion surgery,Pt has 2 plates & 4 screws.     CORONARY ANGIOPLASTY WITH STENT PLACEMENT      2019    HERNIA REPAIR      KNEE SURGERY Left     ACL    PTCA  12/30/2018    ROTATOR CUFF REPAIR Right     UPPER GASTROINTESTINAL ENDOSCOPY N/A 12/14/2022    EGD BIOPSY performed by Zelda Tamayo MD at [facility]        Medication:    Current

## 2023-09-04 NOTE — PLAN OF CARE
Problem: Discharge Planning  Goal: Discharge to home or other facility with appropriate resources  9/3/2023 2202 by Brenna Schofield RN  Outcome: Progressing    Problem: Pain  Goal: Verbalizes/displays adequate comfort level or baseline comfort level  9/3/2023 2202 by Brenna Schofield RN  Outcome: Progressing     Problem: Safety - Adult  Goal: Free from fall injury  9/3/2023 2202 by Brenna Schofield RN  Outcome: Progressing     Problem: Skin/Tissue Integrity  Goal: Absence of new skin breakdown  Description: 1. Monitor for areas of redness and/or skin breakdown  2. Assess vascular access sites hourly  3. Every 4-6 hours minimum:  Change oxygen saturation probe site  4. Every 4-6 hours:  If on nasal continuous positive airway pressure, respiratory therapy assess nares and determine need for appliance change or resting period.   9/3/2023 2202 by Brenna Schofield RN  Outcome: Progressing

## 2023-09-04 NOTE — PROGRESS NOTES
Resting quietly with respirations easy/even on RA. Call in easy reach. Bed alarm on for safety. Continue to monitor closely.   Samia Hendricks RN

## 2023-09-04 NOTE — PROGRESS NOTES
Inpatient Occupational Therapy Evaluation and Treatment    Unit: PCU  Date:  9/4/2023  Patient Name:    Ann-Marie Gibson  Admitting diagnosis:  General weakness [R53.1]  KJ (acute kidney injury) ([address]) [N17.9]  Altered mental status, unspecified altered mental status type [R41.82]  Pneumonia due to infectious organism, unspecified laterality, unspecified part of lung [J18.9]  Community acquired pneumonia of right middle lobe of lung [J18.9]  Admit Date:  9/2/2023  Precautions/Restrictions/WB Status/ Lines/ Wounds/ Oxygen: Fall risk, Bed/chair alarm, Lines (IV), Confusion, Telemetry, and Garza-J collar    Cervical spine fusion (7/10/23) with Garza-J  -No bending at waist, lifting >10lbs, no sudden twisting.   -Nothing more strenuous than daily activities or walking. No running, jogging, swimming, etc...until cleared by neurosurgeon in outpatient follow up.   -Do not drive unless instructed otherwise by your neurosurgeon. Avoid driving/operating dangerous or heavy machinery while on pain, sedative, or seizure medications unless instructed otherwise. Pt seen for cotreatment this date due to patient safety and acute illness/injury    Treatment Time:  13:26-14:05  Treatment Number:  1  Timed Code Treatment Minutes: 29 minutes  Total Treatment Minutes:  39  minutes    Patient Goals for Therapy: \" to get out of bed \"          Discharge Recommendations: Home with 24/7 assist initially  DME needs for discharge: Needs Met       Therapy recommendations for staff:   Assist of 1 for ambulation with use of rolling walker (RW) and gait belt within halls    History of Present Illness: 75-year-old male with a PMHx of CAD, HTN, HLD, anxiety, depression and PTSD who presented to the ED with generalized weakness. Hx is limited to wife as patient did not provide significant history. Wife reported increased confusion being withdrawn over the past few days. No recent changes in medications.  Endorsed worsening SOB with non-productive to stand:    SBA  Stand to sit:    SBA  Bed to chair:     CGA and With RW and gait belt  Bed/ chair to standard toilet:  CGA and With RW and gait belt  Bed/chair to [facility]:   Not Tested  Functional Mobility:   CGA and With RW and gait belt    See PT note for gait analysis. ADLs:  Dressing:      UE:   Not Tested  LE:    Min A to don socks. SBA to don underwear    Bathing:    UE:  Not Tested  LE:  Not Tested    Eating:   Not Tested    Toileting:  SBA    Grooming/hygiene: Not Tested    Activity Tolerance:   Pt completed therapy session with no adverse symptoms     BP (mmHg) HR (bpm) SpO2 (%) on  Comments   Supine at rest       Seated at EOB       Standing       End of session         Positioning Needs:   Pt up in chair, alarm set, call light provided and all needs within reach . Ther Ex / Activities Initiated:   N/A    Patient/Family Education:   Pt educated on role of inpatient OT, plan of care, importance of continued activity, DC recommendations, Functional transfer/mobility safety, Safety awareness, Transfer techniques, and Calling for assist with mobility. Assessment:  Pt seen for Occupational therapy evaluation in acute care setting. Pt demonstrated decreased Activity tolerance, ADLs, IADLs, Balance , Bathing, Bed mobility, Dressing, ROM, Safety Awareness, Strength, Transfers, Cognition, and Coping Skills. Pt functioning below baseline and will likely benefit from skilled occupational therapy services to maximize safety and independence. Recommending Home 24 hr assist upon discharge as patient functioning below baseline level and would benefit from continued therapy services    Goal(s) : To be met in 3 Visits:  Bed to toilet/BSC:       Supervision  Pt will complete 3/3 CHF goals     N/A    To be met in 5 Visits:  Supine to/from Sit in preparation for ADL task:    Independent  Toileting        Supervision  Grooming       Independent  Upper Body Dressing:      Independent  Lower Body Dressing:

## 2023-09-04 NOTE — PROGRESS NOTES
Patient spouse at bedside and brought in a copy of patient current medication list from home. Writer updated home medication list accordingly and placed a copy in patient soft chart.

## 2023-09-04 NOTE — PLAN OF CARE
Problem: Pain  Goal: Verbalizes/displays adequate comfort level or baseline comfort level  9/4/2023 0950 by Jaspal James RN  Outcome: Progressing  9/3/2023 2202 by Cyndi Navarro RN  Outcome: Progressing     Problem: Safety - Adult  Goal: Free from fall injury  9/4/2023 0950 by Jaspal James RN  Outcome: Progressing  9/3/2023 2202 by Cyndi Navarro RN  Outcome: Progressing

## 2023-09-04 NOTE — PROGRESS NOTES
Bedside report and transfer of care given to [facility]. Pt currently resting in bed with the call light within reach. Pt denies any other care needs at this time. Pt stable at this time.

## 2023-09-05 ENCOUNTER — APPOINTMENT (OUTPATIENT)
Dept: MRI IMAGING | Age: 48
DRG: 193 | End: 2023-09-05
Payer: COMMERCIAL

## 2023-09-05 VITALS
SYSTOLIC BLOOD PRESSURE: 134 MMHG | DIASTOLIC BLOOD PRESSURE: 91 MMHG | HEIGHT: 72 IN | WEIGHT: 197.31 LBS | RESPIRATION RATE: 17 BRPM | BODY MASS INDEX: 26.73 KG/M2 | TEMPERATURE: 98 F | HEART RATE: 79 BPM | OXYGEN SATURATION: 94 %

## 2023-09-05 LAB
FOLATE SERPL-MCNC: 3.13 NG/ML (ref 4.78–24.2)
VIT B12 SERPL-MCNC: 819 PG/ML (ref 211–911)

## 2023-09-05 PROCEDURE — 6360000002 HC RX W HCPCS: Performed by: STUDENT IN AN ORGANIZED HEALTH CARE EDUCATION/TRAINING PROGRAM

## 2023-09-05 PROCEDURE — 6370000000 HC RX 637 (ALT 250 FOR IP): Performed by: INTERNAL MEDICINE

## 2023-09-05 PROCEDURE — 99232 SBSQ HOSP IP/OBS MODERATE 35: CPT | Performed by: INTERNAL MEDICINE

## 2023-09-05 PROCEDURE — 6370000000 HC RX 637 (ALT 250 FOR IP): Performed by: STUDENT IN AN ORGANIZED HEALTH CARE EDUCATION/TRAINING PROGRAM

## 2023-09-05 PROCEDURE — 99233 SBSQ HOSP IP/OBS HIGH 50: CPT | Performed by: PSYCHIATRY & NEUROLOGY

## 2023-09-05 PROCEDURE — 70551 MRI BRAIN STEM W/O DYE: CPT

## 2023-09-05 PROCEDURE — 95816 EEG AWAKE AND DROWSY: CPT

## 2023-09-05 PROCEDURE — 2580000003 HC RX 258: Performed by: STUDENT IN AN ORGANIZED HEALTH CARE EDUCATION/TRAINING PROGRAM

## 2023-09-05 RX ORDER — PRAVASTATIN SODIUM 20 MG
40 TABLET ORAL NIGHTLY
Qty: 30 TABLET | Refills: 0 | Status: SHIPPED | OUTPATIENT
Start: 2023-09-05

## 2023-09-05 RX ORDER — CEFUROXIME AXETIL 500 MG/1
500 TABLET ORAL 2 TIMES DAILY
Qty: 10 TABLET | Refills: 0 | Status: SHIPPED | OUTPATIENT
Start: 2023-09-05 | End: 2023-09-10

## 2023-09-05 RX ORDER — FOLIC ACID 1 MG/1
1 TABLET ORAL DAILY
Qty: 90 TABLET | Refills: 1 | Status: SHIPPED | OUTPATIENT
Start: 2023-09-05

## 2023-09-05 RX ORDER — TAMSULOSIN HYDROCHLORIDE 0.4 MG/1
0.4 CAPSULE ORAL DAILY
Qty: 30 CAPSULE | Refills: 0 | Status: SHIPPED | OUTPATIENT
Start: 2023-09-06

## 2023-09-05 RX ADMIN — ALLOPURINOL 200 MG: 100 TABLET ORAL at 08:51

## 2023-09-05 RX ADMIN — Medication 10 ML: at 08:59

## 2023-09-05 RX ADMIN — TAMSULOSIN HYDROCHLORIDE 0.4 MG: 0.4 CAPSULE ORAL at 08:52

## 2023-09-05 RX ADMIN — CLOPIDOGREL BISULFATE 75 MG: 75 TABLET ORAL at 08:52

## 2023-09-05 RX ADMIN — OXYCODONE HYDROCHLORIDE 10 MG: 5 TABLET ORAL at 00:52

## 2023-09-05 RX ADMIN — OXYCODONE HYDROCHLORIDE 10 MG: 5 TABLET ORAL at 08:52

## 2023-09-05 RX ADMIN — SODIUM CHLORIDE: 9 INJECTION, SOLUTION INTRAVENOUS at 00:47

## 2023-09-05 RX ADMIN — PANTOPRAZOLE SODIUM 40 MG: 40 TABLET, DELAYED RELEASE ORAL at 06:31

## 2023-09-05 RX ADMIN — CEFTRIAXONE SODIUM 1000 MG: 1 INJECTION, POWDER, FOR SOLUTION INTRAMUSCULAR; INTRAVENOUS at 00:49

## 2023-09-05 RX ADMIN — AZITHROMYCIN MONOHYDRATE 500 MG: 500 INJECTION, POWDER, LYOPHILIZED, FOR SOLUTION INTRAVENOUS at 02:39

## 2023-09-05 RX ADMIN — FERROUS SULFATE TAB 325 MG (65 MG ELEMENTAL FE) 325 MG: 325 (65 FE) TAB at 08:51

## 2023-09-05 RX ADMIN — VENLAFAXINE HYDROCHLORIDE 75 MG: 75 CAPSULE, EXTENDED RELEASE ORAL at 08:51

## 2023-09-05 RX ADMIN — ASPIRIN 81 MG: 81 TABLET, CHEWABLE ORAL at 08:51

## 2023-09-05 ASSESSMENT — PAIN DESCRIPTION - LOCATION
LOCATION: NECK
LOCATION: NECK

## 2023-09-05 ASSESSMENT — PAIN SCALES - GENERAL
PAINLEVEL_OUTOF10: 7
PAINLEVEL_OUTOF10: 7

## 2023-09-05 ASSESSMENT — PAIN DESCRIPTION - DESCRIPTORS: DESCRIPTORS: THROBBING

## 2023-09-05 ASSESSMENT — PAIN DESCRIPTION - ORIENTATION: ORIENTATION: POSTERIOR

## 2023-09-05 NOTE — PLAN OF CARE
Problem: Discharge Planning  Goal: Discharge to home or other facility with appropriate resources  9/4/2023 2255 by Nenita Pelayo RN  Outcome: Progressing  9/4/2023 0950 by Salma Berman RN  Outcome: Progressing  Flowsheets (Taken 9/4/2023 0745)  Discharge to home or other facility with appropriate resources: Identify barriers to discharge with patient and caregiver     Problem: Pain  Goal: Verbalizes/displays adequate comfort level or baseline comfort level  9/4/2023 2255 by Nenita Pelayo RN  Outcome: Progressing  9/4/2023 0950 by Salma Berman RN  Outcome: Progressing     Problem: Safety - Adult  Goal: Free from fall injury  9/4/2023 2255 by Nenita Pelayo RN  Outcome: Progressing  9/4/2023 0950 by Salma Berman RN  Outcome: Progressing     Problem: Skin/Tissue Integrity  Goal: Absence of new skin breakdown  Description: 1. Monitor for areas of redness and/or skin breakdown  2. Assess vascular access sites hourly  3. Every 4-6 hours minimum:  Change oxygen saturation probe site  4. Every 4-6 hours:  If on nasal continuous positive airway pressure, respiratory therapy assess nares and determine need for appliance change or resting period.   9/4/2023 2255 by Nenita Pelayo RN  Outcome: Progressing  9/4/2023 0950 by Salma Berman RN  Outcome: Progressing     Problem: Nutrition Deficit:  Goal: Optimize nutritional status  9/4/2023 2255 by Nenita Pelayo RN  Outcome: Progressing  9/4/2023 0950 by Salma Berman RN  Outcome: Progressing

## 2023-09-05 NOTE — ACP (ADVANCE CARE PLANNING)
Advance Care Planning     General Advance Care Planning (ACP) Conversation    Date of Conversation: 9/2/2023  Conducted with: Patient with Decision Making Capacity    Healthcare Decision Maker:  No healthcare decision makers have been documented. Click here to complete [address] including selection of the Healthcare Decision Maker Relationship (ie \"Primary\")   Today we documented Decision Maker(s) consistent with Legal Next of Kin hierarchy.     Content/Action Overview:  DECLINED ACP Conversation - will revisit periodically  Reviewed DNR/DNI and patient elects Full Code (Attempt Resuscitation)        Length of Voluntary ACP Conversation in minutes:  <16 minutes (Non-Billable)    Anali Subramanian RN

## 2023-09-05 NOTE — PROGRESS NOTES
Neurology Progress Note    ID: Alli Addison is a 52 y.o. male    : 1975     LOS: 2 days     ASSESSMENT    Principal Problem:    Pneumonia due to infectious organism  Active Problems:    General weakness    Metabolic encephalopathy    Fall  Resolved Problems:    * No resolved hospital problems. *    The patient remains to have asterixis. The CT brain showed no acute ischemic stroke but mild degree of brain atrophy over the frontal parietal area. KJ is improving. Normal ammonia level. The MRI brain showing no acute ischemic injury with minimal small vessel disease. From my personal review, MRI brain also showed evidence of cerebellar atrophy, consistent with history of alcohol abuse. EEG showed no evidence of seizure tendency but mild to moderate degree of generalized slowing background activity. From neurology perspective, the patient is high risk for memory difficulty due to history of alcohol dependence. 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 The patient is noted for folate deficiency. Plan:     1. Folate supplement. 2.  Minimize sedation. 3.  Continue quetiapine. Follow-up with neurology in 4 weeks.     Medications:  Scheduled Meds:    tamsulosin  0.4 mg Oral Daily    allopurinol  200 mg Oral Daily    amitriptyline  25 mg Oral Nightly    aspirin  81 mg Oral Daily    [Held by provider] carvedilol  3.125 mg Oral BID WC    [Held by provider] cloNIDine  0.1 mg Oral BID    clopidogrel  75 mg Oral Daily    ferrous sulfate  325 mg Oral Daily with breakfast    pantoprazole  40 mg Oral QAM AC    pravastatin  40 mg Oral Nightly    QUEtiapine  400 mg Oral Nightly    venlafaxine  75 mg Oral Daily    sodium chloride flush  5-40 mL IntraVENous 2 times per day    enoxaparin  40 mg SubCUTAneous QPM    cefTRIAXone (ROCEPHIN) IV  1,000 mg IntraVENous Q24H    nicotine  1 patch TransDERmal Daily    ezetimibe  10 mg Oral Nightly     Continuous Infusions:    sodium chloride 10 mL/hr at

## 2023-09-05 NOTE — DISCHARGE SUMMARY
Name:  Anamaria [facility]  Room:  /3303-59  MRN:    [id number]    Discharge Summary      This discharge summary is in conjunction with a complete physical exam done on the day of discharge. Attending Physician: Dr. Rosy Wilkes  Discharging Physician: Dr. Fabian Watsoner: 9/2/2023  Discharge:  9/5/2023    HPI:  Patient is a 77-year-old male with a PMHx of CAD, HTN, HLD, anxiety, depression and PTSD who presented to the ED with generalized weakness. Hx is limited to wife as patient did not provide significant history. Wife reported increased confusion being withdrawn over the past few days. No recent changes in medications. Endorsed worsening SOB with non-productive cough for 2 days. No travels or sick contacts. No CP, nausea, vomiting, abdominal pain, constipation, diarrhea or other complaints. Smokes 1 PPD over past 30 years. No alcohol or illicit drug use. History obtained from: patient and wife at bedside. Diagnoses this Admission and Hospital Course   # Community-acquire pneumonia of right middle lung  - Endorsed worsening SOB with non-productive cough for 2 days. No travels or sick contacts. - On RA. Afebrile, mild leukocytosis of 12, LA normal. CXR suspicious for infiltrates in RML.   - Started on Rocephin/Azithro and symptoms improved   - remains stable on RA      # KJ     - No recent NSAIDs. Having decreased PO over past few days. - Cr 2.1 on admission, baseline ~ 1.0. UA with elevated SG.  - Held home HCTZ. given challenge with IVF. resolved. Voiding trial successful today      # Acute metabolic encephalopathy- resolved   - likely due to ARF and use of psychotropic meds  - Wife reported increased confusion being withdrawn over the past few days. No recent changes in medications.  - A&Ox2-3, inattentive. CT Head non acute. - Likely in setting of above. Neurology consulted for evaluation, MRI brain, EEG ordered. B12, folate (low) levels.   D/c on      # Recurrent falls and reports gen (before breakfast)     pravastatin 20 MG tablet  Commonly known as: PRAVACHOL  Take 2 tablets by mouth at bedtime     propranolol 10 MG tablet  Commonly known as: INDERAL     QUEtiapine 200 MG tablet  Commonly known as: SEROQUEL     venlafaxine 75 MG extended release capsule  Commonly known as: EFFEXOR XR            STOP taking these medications      carvedilol 3.125 MG tablet  Commonly known as: COREG               Where to Get Your Medications        These medications were sent to [facility] [address], [address] [place] [address]      Phone: [phone]   cefUROXime 955 MG tablet  folic acid 1 MG tablet  pravastatin 20 MG tablet  tamsulosin 0.4 MG capsule           Discharged in stable condition to home. Follow Up: Follow up with PCP and neurology.     Zay Diamond MD, 9/19/2023 1:29 PM

## 2023-09-05 NOTE — PROGRESS NOTES
Component Value Date/Time    CHOL 150 12/31/2018 04:18 AM    HDL 32 06/26/2020 08:02 AM    TRIG 194 12/31/2018 04:18 AM     LIVER PROFILE:   Recent Labs     09/02/23  2255 09/03/23  0459   AST 11* 10*   ALT <5* <5*   BILITOT <0.2 <0.2   ALKPHOS 87 85      Radiology  CT Head W/O Contrast   Final Result   No acute intracranial abnormality. XR CHEST PORTABLE   Final Result   Suspected evolving bronchopneumonia in the right lung. Recommend imaging   follow-up to resolution. MRI BRAIN WO CONTRAST    (Results Pending)         Assessment & Plan:     Patient Active Problem List    Diagnosis Date Noted    Metabolic encephalopathy 42/25/8356    Fall 09/04/2023    Community acquired pneumonia of right middle lobe of lung 09/03/2023    Contusion of right foot 07/20/2023    Displaced fracture of fifth metatarsal bone, left foot, initial encounter for closed fracture 07/20/2023    GERD (gastroesophageal reflux disease) 06/29/2021    Nasal congestion 06/29/2021    Restrictive lung disease 06/29/2021    Chronic bronchitis ([address]) 05/27/2021    JOHNATHAN on CPAP 05/27/2021    KJ (acute kidney injury) ([address]) 05/27/2021    Marijuana use 05/27/2021    SOB (shortness of breath) 06/12/2020    General weakness 06/12/2020    Mixed hyperlipidemia 06/11/2019    Anxiety and depression 12/31/2018    Alcohol use vs abuse 12/31/2018    Cocaine use 12/31/2018    Other forms of angina pectoris ([address]) 12/30/2018    NSTEMI s/p PCI and stenting of two LAD lesions     Current smoker     Coronary artery disease      # Community-acquire pneumonia of right middle lung  - Endorsed worsening SOB with non-productive cough for 2 days. No travels or sick contacts. - On RA. Afebrile, mild leukocytosis of 12, LA normal. CXR suspicious for infiltrates in RML.   - Started on Rocephin/Azithro and symptoms improved   - remains stable on RA      # KJ    - No recent NSAIDs. Having decreased PO over past few days. - Cr 2.1 on admission, baseline ~ 1.0. UA with elevated SG.  - Held home HCTZ. given challenge with IVF. resolved. Voiding trial successful today      # Acute metabolic encephalopathy- resolved   - likely due to ARF and use of psychotropic meds  - Wife reported increased confusion being withdrawn over the past few days. No recent changes in medications.  - A&Ox2-3, inattentive. CT Head non acute. - Likely in setting of above. Neurology consulted for evaluation, MRI brain, EEG ordered. B12, folate (low) levels. # Recurrent falls and reports gen weakness  - Had cervical fusion at [facility] on 7/10/2023.  - PT/OT consulted for evaluation, appreciate assistance. - improved weakness with IVF and now ambulating well  - ct head neg     # Hypokalemia  - No emesis or diarrhea. On HCTZ. - Mild, repleted. # Hyponatremia  - Having decreased PO intake. - Clinically hypovolemic.  - Follow-up repeat labs after IVF. # Anxiety / PTSD / depression  - Followed by Psychiatry outpatient. - Continue home psychotropic medications. # Non-obstructive CAD  - Continue DAPT, statin and Zetia per Cardiology. # Essential hypretension  - Hold home Coreg, HCTZ and Clonidine in setting of borderline hypotension. # Tobacco abuse  - Hx of smoking 1 PPD for over 30 years. - Advised on importance of complete cessation.     Recent CX spine surgery  Wears hard collar      Advanced directive: full  Diet: cardiac  DVT ppx: Lovenox     Pt/ot  Dc planning     CATERINA Daly - CNP    Agree with above  Changes made to note    Sherif Cota MD,9/5/2023 1:20 PM

## 2023-09-05 NOTE — CARE COORDINATION
Case Management Assessment  Initial Evaluation    Date/Time of Evaluation: 9/5/2023 10:54 AM  Assessment Completed by: Sabas Levy RN    If patient is discharged prior to next notation, then this note serves as note for discharge by case management. Patient Name: Hossein Chiang                   YOB: 1975  Diagnosis: General weakness [R53.1]  KJ (acute kidney injury) ([address]) [N17.9]  Altered mental status, unspecified altered mental status type [R41.82]  Pneumonia due to infectious organism, unspecified laterality, unspecified part of lung [J18.9]  Community acquired pneumonia of right middle lobe of lung [J18.9]                   Date / Time: 9/2/2023 10:47 PM    Patient Admission Status: Inpatient   Readmission Risk (Low < 19, Mod (19-27), High > 27): Readmission Risk Score: 15.5    Current PCP: CATERINA Toscano CNP  PCP verified by CM? Yes (armando)    Chart Reviewed: Yes      History Provided by: Patient  Patient Orientation: Alert and Oriented    Patient Cognition: Alert    Hospitalization in the last 30 days (Readmission):  No    If yes, Readmission Assessment in CM Navigator will be completed. Advance Directives:      Code Status: Full Code   Patient's Primary Decision Maker is: Legal Next of Kin      Discharge Planning:    Patient lives with: Spouse/Significant Other Type of Home: Apartment  Primary Care Giver: Self  Patient Support Systems include: Spouse/Significant Other   Current Financial resources: Medicaid  Current community resources: None  Current services prior to admission: Durable Medical Equipment            Current DME: Walker            Type of Home Care services:  None    ADLS  Prior functional level: Independent in ADLs/IADLs  Current functional level: Independent in ADLs/IADLs    PT AM-PAC: 20 /24  OT AM-PAC: 18 /24    Family can provide assistance at DC:  Yes  Would you like Case Management to discuss the discharge plan with any other family [J18.9]  Community acquired pneumonia of right middle lobe of lung [H90.5]    IF APPLICABLE: The Patient and/or patient representative Osvaldo and his family were provided with a choice of provider and agrees with the discharge plan. Freedom of choice list with basic dialogue that supports the patient's individualized plan of care/goals and shares the quality data associated with the providers was provided to:     Patient Representative Name:       The Patient and/or Patient Representative Agree with the Discharge Plan?       Ramya Abraham RN  Case Management Department  Ph: [phone] Fax: [phone]

## 2023-09-05 NOTE — PROGRESS NOTES
Pt discharged home with wife in stable condition; Pt was offered transportation to [facility], but decided to walk out with his wife.

## 2023-09-08 LAB — VIT B1 SERPL-MCNC: <2 NMOL/L (ref 4–15)

## 2023-09-20 ENCOUNTER — TELEPHONE (OUTPATIENT)
Dept: ORTHOPEDIC SURGERY | Age: 48
End: 2023-09-20
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
## 2023-10-30 ENCOUNTER — TELEPHONE (OUTPATIENT)
Age: 48
End: 2023-10-30

## 2023-10-30 NOTE — TELEPHONE ENCOUNTER
Patient spouse cancelled appointment on 10/31/23 with Dr Ashlee Orozco for hospital f/u. Reason: pt spouse has strep    Patient did not reschedule appointment.     Appointment rescheduled for states she will get her work schedule and CB to r/s his appt as she's not sure what days she's off and she is his transportation

## 2023-11-28 ENCOUNTER — OFFICE VISIT (OUTPATIENT)
Age: 48
End: 2023-11-28
Payer: COMMERCIAL

## 2023-11-28 VITALS
HEIGHT: 72 IN | DIASTOLIC BLOOD PRESSURE: 62 MMHG | SYSTOLIC BLOOD PRESSURE: 84 MMHG | HEART RATE: 119 BPM | BODY MASS INDEX: 25.06 KG/M2 | WEIGHT: 185 LBS | OXYGEN SATURATION: 98 %

## 2023-11-28 DIAGNOSIS — R27.0 ATAXIA: ICD-10-CM

## 2023-11-28 DIAGNOSIS — G25.79 SEROTONIN SYNDROME: ICD-10-CM

## 2023-11-28 DIAGNOSIS — F02.B0 MODERATE DEMENTIA ASSOCIATED WITH OTHER UNDERLYING DISEASE, UNSPECIFIED WHETHER BEHAVIORAL, PSYCHOTIC, OR MOOD DISTURBANCE OR ANXIETY (HCC): Primary | ICD-10-CM

## 2023-11-28 PROCEDURE — G8427 DOCREV CUR MEDS BY ELIG CLIN: HCPCS | Performed by: PSYCHIATRY & NEUROLOGY

## 2023-11-28 PROCEDURE — 99214 OFFICE O/P EST MOD 30 MIN: CPT | Performed by: PSYCHIATRY & NEUROLOGY

## 2023-11-28 PROCEDURE — G8484 FLU IMMUNIZE NO ADMIN: HCPCS | Performed by: PSYCHIATRY & NEUROLOGY

## 2023-11-28 PROCEDURE — 4004F PT TOBACCO SCREEN RCVD TLK: CPT | Performed by: PSYCHIATRY & NEUROLOGY

## 2023-11-28 PROCEDURE — G8419 CALC BMI OUT NRM PARAM NOF/U: HCPCS | Performed by: PSYCHIATRY & NEUROLOGY

## 2023-11-28 RX ORDER — MEMANTINE HYDROCHLORIDE 5 MG/1
5 TABLET ORAL 2 TIMES DAILY
Qty: 60 TABLET | Refills: 1 | Status: SHIPPED | OUTPATIENT
Start: 2023-11-28

## 2023-11-28 NOTE — PROGRESS NOTES
Neurology outpatient follow-up visit    Patient name: Nereyda Suazo      Chief Complaint:  Memory loss. History of present illness: This is a 52years old right-handed male. The patient was admitted last night due to shortness of breath, and KJ. Upon admission, the patient was found to have CAP. Per family, the patient was reported memory difficulty which started about 5 years ago. The course is slow progressive. The patient also complained tremor on both hands. The patient also had recent cervical decompression surgery in June. His wife feels the patient has been worse on his mentation after the surgery. The patient is noted for history of alcohol dependence 10 years. The patient has been sober for more than 4 years. Unfortunately, his memory difficulty has not been improved after stop drinking. The patient denies significant memory difficulty during my assessment. The patient states he would like to go home. The patient remains to have asterixis. The CT brain showed no acute ischemic stroke but mild degree of brain atrophy over the frontal parietal area. KJ is improving. Normal ammonia level. The MRI brain showing no acute ischemic injury with minimal small vessel disease. From my personal review, MRI brain also showed evidence of cerebellar atrophy, consistent with history of alcohol abuse. EEG showed no evidence of seizure tendency but mild to moderate degree of generalized slowing background activity. From neurology perspective, the patient is high risk for memory difficulty due to history of alcohol dependence. 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 The patient is noted for folate deficiency. Interval history:  11/27/23: The patient is here for follow-up after discharge from the hospital.  The patient remains to have significant unsteady gait, forgetfulness, and intermittent muscle twitching. The patient also complains significant nightmare and insomnia.   The

## 2024-01-23 DIAGNOSIS — F02.B0 MODERATE DEMENTIA ASSOCIATED WITH OTHER UNDERLYING DISEASE, UNSPECIFIED WHETHER BEHAVIORAL, PSYCHOTIC, OR MOOD DISTURBANCE OR ANXIETY (HCC): Primary | ICD-10-CM

## 2024-01-23 RX ORDER — MEMANTINE HYDROCHLORIDE 5 MG/1
TABLET ORAL
Qty: 60 TABLET | Refills: 0 | Status: SHIPPED | OUTPATIENT
Start: 2024-01-23

## 2024-02-22 ENCOUNTER — HOSPITAL ENCOUNTER (OUTPATIENT)
Age: 49
Discharge: HOME | End: 2024-02-22

## 2024-02-22 ENCOUNTER — OFFICE VISIT (OUTPATIENT)
Age: 49
End: 2024-02-22
Payer: COMMERCIAL

## 2024-02-22 VITALS
SYSTOLIC BLOOD PRESSURE: 138 MMHG | RESPIRATION RATE: 13 BRPM | HEIGHT: 72 IN | WEIGHT: 213 LBS | BODY MASS INDEX: 28.85 KG/M2 | HEART RATE: 90 BPM | OXYGEN SATURATION: 97 % | DIASTOLIC BLOOD PRESSURE: 84 MMHG

## 2024-02-22 DIAGNOSIS — E51.9 THIAMINE DEFICIENCY: ICD-10-CM

## 2024-02-22 DIAGNOSIS — E53.8 FOLATE DEFICIENCY: ICD-10-CM

## 2024-02-22 DIAGNOSIS — F02.B0 MODERATE DEMENTIA ASSOCIATED WITH OTHER UNDERLYING DISEASE, UNSPECIFIED WHETHER BEHAVIORAL, PSYCHOTIC, OR MOOD DISTURBANCE OR ANXIETY (HCC): Primary | ICD-10-CM

## 2024-02-22 DIAGNOSIS — F02.B0 MODERATE DEMENTIA ASSOCIATED WITH OTHER UNDERLYING DISEASE, UNSPECIFIED WHETHER BEHAVIORAL, PSYCHOTIC, OR MOOD DISTURBANCE OR ANXIETY (HCC): ICD-10-CM

## 2024-02-22 LAB
FOLATE SERPL-MCNC: >20 NG/ML (ref 4.78–24.2)
VIT B12 SERPL-MCNC: 397 PG/ML (ref 211–911)

## 2024-02-22 PROCEDURE — 4004F PT TOBACCO SCREEN RCVD TLK: CPT | Performed by: PSYCHIATRY & NEUROLOGY

## 2024-02-22 PROCEDURE — 36415 COLL VENOUS BLD VENIPUNCTURE: CPT

## 2024-02-22 PROCEDURE — 84425 ASSAY OF VITAMIN B-1: CPT

## 2024-02-22 PROCEDURE — G8419 CALC BMI OUT NRM PARAM NOF/U: HCPCS | Performed by: PSYCHIATRY & NEUROLOGY

## 2024-02-22 PROCEDURE — G8484 FLU IMMUNIZE NO ADMIN: HCPCS | Performed by: PSYCHIATRY & NEUROLOGY

## 2024-02-22 PROCEDURE — 82746 ASSAY OF FOLIC ACID SERUM: CPT

## 2024-02-22 PROCEDURE — 82607 VITAMIN B-12: CPT

## 2024-02-22 PROCEDURE — 99214 OFFICE O/P EST MOD 30 MIN: CPT | Performed by: PSYCHIATRY & NEUROLOGY

## 2024-02-22 PROCEDURE — G8427 DOCREV CUR MEDS BY ELIG CLIN: HCPCS | Performed by: PSYCHIATRY & NEUROLOGY

## 2024-02-22 RX ORDER — AMLODIPINE BESYLATE 2.5 MG/1
2.5 TABLET ORAL DAILY
COMMUNITY
Start: 2024-02-16

## 2024-02-22 RX ORDER — ROPINIROLE 1 MG/1
1 TABLET, FILM COATED ORAL NIGHTLY
Qty: 30 TABLET | Refills: 2 | Status: SHIPPED | OUTPATIENT
Start: 2024-02-22

## 2024-02-22 RX ORDER — BACLOFEN 10 MG/1
10 TABLET ORAL 3 TIMES DAILY
COMMUNITY

## 2024-02-22 RX ORDER — THIAMINE MONONITRATE (VIT B1) 100 MG
500 TABLET ORAL DAILY
COMMUNITY

## 2024-02-22 RX ORDER — BUDESONIDE, GLYCOPYRROLATE, AND FORMOTEROL FUMARATE 160; 9; 4.8 UG/1; UG/1; UG/1
AEROSOL, METERED RESPIRATORY (INHALATION)
COMMUNITY

## 2024-02-22 RX ORDER — GABAPENTIN 300 MG/1
300 CAPSULE ORAL 3 TIMES DAILY
COMMUNITY

## 2024-02-22 RX ORDER — DAPAGLIFLOZIN 10 MG/1
10 TABLET, FILM COATED ORAL EVERY MORNING
COMMUNITY

## 2024-02-22 RX ORDER — MEMANTINE HYDROCHLORIDE 10 MG/1
10 TABLET ORAL 2 TIMES DAILY
Qty: 60 TABLET | Refills: 2 | Status: SHIPPED | OUTPATIENT
Start: 2024-02-22

## 2024-02-22 RX ORDER — TRAZODONE HYDROCHLORIDE 100 MG/1
100 TABLET ORAL NIGHTLY
COMMUNITY

## 2024-02-22 NOTE — PROGRESS NOTES
Social history:     reports that he has been smoking cigarettes. He started smoking about 33 years ago. He has a 16.7 pack-year smoking history. He has never used smokeless tobacco. He reports that he does not currently use alcohol after a past usage of about 3.0 standard drinks of alcohol per week. He reports that he does not currently use drugs after having used the following drugs: Marijuana (Weed).     Family history:    Family History   Problem Relation Age of Onset    Diabetes Mother     Heart Disease Mother     Cancer Sister     Heart Disease Father     Diabetes Brother     Prostate Cancer Maternal Uncle     Stomach Cancer Maternal Grandmother     Cancer Maternal Grandfather         Review of system:  No chest pain, shortness of breath, palpitation, cough, fever, abdominal pain, vomiting, diarrhea, dysuria, vertigo, joint pain, change in speech/vision or new onset of weakness/numbness. Remaining as per HPI.      /84 (Site: Left Upper Arm, Position: Sitting, Cuff Size: Small Adult)   Pulse 90   Resp 13   Ht 1.829 m (6')   Wt 96.6 kg (213 lb)   SpO2 97% Comment: RA  BMI 28.89 kg/m²     Neurological examination:  MENTAL STATUS: AAOx3  LANG/SPEECH: Fluent, intact naming, repetition & comprehension  CRANIAL NERVES:    II: Pupils equal and reactive, no RAPD, normal visual field and fundus    III, IV, VI: EOM intact, no gaze preference or deviation    V: normal    VII: no facial asymmetry    VIII: normal hearing to speech  MOTOR: Generalized weakness 4/5.    REFLEXES: Generalized 1+.  SENSORY: Loss of light touch sensation below knees.  COORD: Positive Romberg test and failed tandem walk.    Short-term memory difficulty.    Imaging, procedure, and laboratory data:   I reviewed the previous brain imagings.    Impression:      ICD-10-CM    1. Moderate dementia associated with other underlying disease, unspecified whether behavioral, psychotic, or mood disturbance or anxiety ([facility])  F02.B0 VITAMIN B1

## 2024-03-02 LAB — VIT B1 SERPL-MCNC: 457 UG/DL (ref 4–15)

## 2024-03-15 ENCOUNTER — TELEPHONE (OUTPATIENT)
Age: 49
End: 2024-03-15

## 2024-03-15 NOTE — TELEPHONE ENCOUNTER
Pt asking for lab results drawn 2/22/24.  Will route to Dr. Wellington to advise if he needs any adjustments made to current oral supplements.     Next appt sched 5/21/24.

## 2024-03-17 RX ORDER — THIAMINE MONONITRATE (VIT B1) 100 MG
100 TABLET ORAL DAILY
Qty: 30 TABLET | Refills: 0
Start: 2024-03-17

## 2024-03-17 RX ORDER — LANOLIN ALCOHOL/MO/W.PET/CERES
1000 CREAM (GRAM) TOPICAL DAILY
Qty: 30 TABLET | Refills: 2 | Status: SHIPPED | OUTPATIENT
Start: 2024-03-17

## 2024-04-25 ENCOUNTER — TELEPHONE (OUTPATIENT)
Age: 49
End: 2024-04-25

## 2024-04-25 NOTE — TELEPHONE ENCOUNTER
Maura Zhou filled this Rx for pt last, not Dr. Wellington.  LM advising pt to contact her office or his pharmacy for refill.

## 2024-04-25 NOTE — TELEPHONE ENCOUNTER
Pt need refill on folic acid, 1 mg tablet. Pt uses [facility] outpatient pharmacy  Pt last seen 2/22/24  Pt next ov 5/21/24

## 2024-05-08 RX ORDER — LANOLIN ALCOHOL/MO/W.PET/CERES
1000 CREAM (GRAM) TOPICAL DAILY
Qty: 30 TABLET | Refills: 2 | Status: SHIPPED | OUTPATIENT
Start: 2024-05-08

## 2024-05-15 NOTE — TELEPHONE ENCOUNTER
Last seen: 2/22/24    Next appt: 5/21/24    Last filled: 2/22/24 for 30-day supply with 2 refills

## 2024-05-21 ENCOUNTER — OFFICE VISIT (OUTPATIENT)
Age: 49
End: 2024-05-21
Payer: COMMERCIAL

## 2024-05-21 VITALS
HEART RATE: 93 BPM | OXYGEN SATURATION: 90 % | HEIGHT: 72 IN | RESPIRATION RATE: 16 BRPM | WEIGHT: 215 LBS | BODY MASS INDEX: 29.12 KG/M2 | SYSTOLIC BLOOD PRESSURE: 130 MMHG | DIASTOLIC BLOOD PRESSURE: 76 MMHG

## 2024-05-21 DIAGNOSIS — F02.B0 MODERATE DEMENTIA ASSOCIATED WITH OTHER UNDERLYING DISEASE, UNSPECIFIED WHETHER BEHAVIORAL, PSYCHOTIC, OR MOOD DISTURBANCE OR ANXIETY (HCC): ICD-10-CM

## 2024-05-21 DIAGNOSIS — E53.8 FOLATE DEFICIENCY: ICD-10-CM

## 2024-05-21 DIAGNOSIS — E51.9 THIAMINE DEFICIENCY: Primary | ICD-10-CM

## 2024-05-21 DIAGNOSIS — R40.4 SPELL OF ALTERED CONSCIOUSNESS: ICD-10-CM

## 2024-05-21 DIAGNOSIS — F04 WERNICKE-KORSAKOFF SYNDROME (HCC): ICD-10-CM

## 2024-05-21 PROCEDURE — 99214 OFFICE O/P EST MOD 30 MIN: CPT | Performed by: PSYCHIATRY & NEUROLOGY

## 2024-05-21 PROCEDURE — G8419 CALC BMI OUT NRM PARAM NOF/U: HCPCS | Performed by: PSYCHIATRY & NEUROLOGY

## 2024-05-21 PROCEDURE — 4004F PT TOBACCO SCREEN RCVD TLK: CPT | Performed by: PSYCHIATRY & NEUROLOGY

## 2024-05-21 PROCEDURE — G8427 DOCREV CUR MEDS BY ELIG CLIN: HCPCS | Performed by: PSYCHIATRY & NEUROLOGY

## 2024-05-21 RX ORDER — ALPRAZOLAM 0.5 MG/1
0.5 TABLET ORAL NIGHTLY PRN
COMMUNITY

## 2024-05-21 RX ORDER — THIAMINE MONONITRATE (VIT B1) 100 MG
100 TABLET ORAL DAILY
Qty: 90 TABLET | Refills: 1 | Status: SHIPPED | OUTPATIENT
Start: 2024-05-21

## 2024-05-21 RX ORDER — LANOLIN ALCOHOL/MO/W.PET/CERES
1000 CREAM (GRAM) TOPICAL DAILY
Qty: 90 TABLET | Refills: 1 | Status: SHIPPED | OUTPATIENT
Start: 2024-05-21

## 2024-05-21 NOTE — PROGRESS NOTES
Neurology outpatient follow-up visit    Patient name: Osvaldo Noble      Chief Complaint:  Memory loss.    History of present illness:  This is a 47 years old right-handed male.  The patient was admitted last night due to shortness of breath, and KJ.  Upon admission, the patient was found to have CAP.  Per family, the patient was reported memory difficulty which started about 5 years ago.  The course is slow progressive.  The patient also complained tremor on both hands.  The patient also had recent cervical decompression surgery in June.  His wife feels the patient has been worse on his mentation after the surgery.  The patient is noted for history of alcohol dependence 10 years.  The patient has been sober for more than 4 years.  Unfortunately, his memory difficulty has not been improved after stop drinking.  The patient denies significant memory difficulty during my assessment.  The patient states he would like to go home.    The patient remains to have asterixis.  The CT brain showed no acute ischemic stroke but mild degree of brain atrophy over the frontal parietal area.  KJ is improving.  Normal ammonia level.  The MRI brain showing no acute ischemic injury with minimal small vessel disease.  From my personal review, MRI brain also showed evidence of cerebellar atrophy, consistent with history of alcohol abuse.  EEG showed no evidence of seizure tendency but mild to moderate degree of generalized slowing background activity.     From neurology perspective, the patient is high risk for memory difficulty due to history of alcohol dependence.  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     The patient is noted for folate deficiency.    Interval history:  11/27/23: The patient is here for follow-up after discharge from the hospital.  The patient remains to have significant unsteady gait, forgetfulness, and intermittent muscle twitching.  The patient also complains significant nightmare and insomnia.  The

## 2024-05-21 NOTE — PATIENT INSTRUCTIONS
YOU MUST CONFIRM YOUR APPOINTMENT 1 DAY PRIOR OR IT WILL BE CANCELLED!!   Our office will call you 3 times the day prior to your appointment in an attempt to confirm.  Please return our call ASAP or confirm your appt through [employer] no later than 3 pm the day before your appointment.  If we do not hear back from you by 3 pm to confirm, your appointment will be cancelled & someone will be added into that slot from our wait list.       EEG PREP:  1. Patient must NOT have more than 5 hours of sleep prior to the EEG  2. Patient should have CLEAN hair, no hairspray, gel, cream rinse, hair pins, or chemical treatments within 48 hours prior to EEG  3. NO caffeine, pain medications or sedatives on the day of the test   4. Arrive 30 minutes prior to appointment time (check in at Registration Desk on 1st floor of hospital main entrance - by the [employer] shop)  5. Procedure will last 60-90 minutes.

## 2024-05-22 DIAGNOSIS — F02.B0 MODERATE DEMENTIA ASSOCIATED WITH OTHER UNDERLYING DISEASE, UNSPECIFIED WHETHER BEHAVIORAL, PSYCHOTIC, OR MOOD DISTURBANCE OR ANXIETY (HCC): ICD-10-CM

## 2024-05-22 RX ORDER — MEMANTINE HYDROCHLORIDE 10 MG/1
TABLET ORAL
Qty: 60 TABLET | Refills: 3 | Status: SHIPPED | OUTPATIENT
Start: 2024-05-22

## 2024-05-22 RX ORDER — ROPINIROLE 1 MG/1
TABLET, FILM COATED ORAL
Qty: 30 TABLET | Refills: 0 | Status: SHIPPED | OUTPATIENT
Start: 2024-05-22

## 2024-06-11 ENCOUNTER — HOSPITAL ENCOUNTER (OUTPATIENT)
Age: 49
Discharge: HOME | End: 2024-06-11
Payer: COMMERCIAL

## 2024-06-11 DIAGNOSIS — D64.9 ANEMIA, UNSPECIFIED TYPE: ICD-10-CM

## 2024-06-11 DIAGNOSIS — D64.9 ANEMIA, UNSPECIFIED TYPE: Primary | ICD-10-CM

## 2024-06-11 PROCEDURE — 80048 BASIC METABOLIC PNL TOTAL CA: CPT

## 2024-06-11 PROCEDURE — 80076 HEPATIC FUNCTION PANEL: CPT

## 2024-06-11 PROCEDURE — 85025 COMPLETE CBC W/AUTO DIFF WBC: CPT

## 2024-06-11 PROCEDURE — 36415 COLL VENOUS BLD VENIPUNCTURE: CPT

## 2024-06-12 ENCOUNTER — APPOINTMENT (OUTPATIENT)
Dept: CT IMAGING | Age: 49
DRG: 190 | End: 2024-06-12
Payer: COMMERCIAL

## 2024-06-12 ENCOUNTER — HOSPITAL ENCOUNTER (INPATIENT)
Age: 49
LOS: 3 days | Discharge: HOME | DRG: 190 | End: 2024-06-16
Attending: STUDENT IN AN ORGANIZED HEALTH CARE EDUCATION/TRAINING PROGRAM | Admitting: INTERNAL MEDICINE
Payer: COMMERCIAL

## 2024-06-12 ENCOUNTER — APPOINTMENT (OUTPATIENT)
Dept: GENERAL RADIOLOGY | Age: 49
DRG: 190 | End: 2024-06-12
Payer: COMMERCIAL

## 2024-06-12 DIAGNOSIS — R09.02 HYPOXEMIA: ICD-10-CM

## 2024-06-12 DIAGNOSIS — J96.01 ACUTE RESPIRATORY FAILURE WITH HYPOXIA (HCC): Primary | ICD-10-CM

## 2024-06-12 DIAGNOSIS — R06.02 SHORTNESS OF BREATH: ICD-10-CM

## 2024-06-12 DIAGNOSIS — J44.1 COPD EXACERBATION (HCC): ICD-10-CM

## 2024-06-12 LAB
ALBUMIN SERPL-MCNC: 3.4 G/DL (ref 3.4–5)
ALBUMIN SERPL-MCNC: 3.7 G/DL (ref 3.4–5)
ALBUMIN/GLOB SERPL: 1 {RATIO} (ref 1.1–2.2)
ALP SERPL-CCNC: 106 U/L (ref 40–129)
ALP SERPL-CCNC: 117 U/L (ref 40–129)
ALT SERPL-CCNC: 19 U/L (ref 10–40)
ALT SERPL-CCNC: 24 U/L (ref 10–40)
ANION GAP SERPL CALCULATED.3IONS-SCNC: 11 MMOL/L (ref 3–16)
ANION GAP SERPL CALCULATED.3IONS-SCNC: 6 MMOL/L (ref 3–16)
AST SERPL-CCNC: 27 U/L (ref 15–37)
AST SERPL-CCNC: 41 U/L (ref 15–37)
BASOPHILS # BLD: 0.1 K/UL (ref 0–0.2)
BASOPHILS # BLD: 0.2 K/UL (ref 0–0.2)
BASOPHILS NFR BLD: 0.7 %
BASOPHILS NFR BLD: 1.2 %
BILIRUB DIRECT SERPL-MCNC: <0.2 MG/DL (ref 0–0.3)
BILIRUB INDIRECT SERPL-MCNC: ABNORMAL MG/DL (ref 0–1)
BILIRUB SERPL-MCNC: <0.2 MG/DL (ref 0–1)
BILIRUB SERPL-MCNC: <0.2 MG/DL (ref 0–1)
BILIRUB UR QL STRIP.AUTO: NEGATIVE
BUN SERPL-MCNC: 10 MG/DL (ref 7–20)
BUN SERPL-MCNC: 8 MG/DL (ref 7–20)
CALCIUM SERPL-MCNC: 8.4 MG/DL (ref 8.3–10.6)
CALCIUM SERPL-MCNC: 8.7 MG/DL (ref 8.3–10.6)
CHLORIDE SERPL-SCNC: 100 MMOL/L (ref 99–110)
CHLORIDE SERPL-SCNC: 100 MMOL/L (ref 99–110)
CLARITY UR: CLEAR
CO2 SERPL-SCNC: 30 MMOL/L (ref 21–32)
CO2 SERPL-SCNC: 31 MMOL/L (ref 21–32)
COLOR UR: YELLOW
CREAT SERPL-MCNC: 1 MG/DL (ref 0.9–1.3)
CREAT SERPL-MCNC: 1 MG/DL (ref 0.9–1.3)
DEPRECATED RDW RBC AUTO: 17.4 % (ref 12.4–15.4)
DEPRECATED RDW RBC AUTO: 17.9 % (ref 12.4–15.4)
EOSINOPHIL # BLD: 0.3 K/UL (ref 0–0.6)
EOSINOPHIL # BLD: 0.3 K/UL (ref 0–0.6)
EOSINOPHIL NFR BLD: 1.8 %
EOSINOPHIL NFR BLD: 2.2 %
FLUAV RNA RESP QL NAA+PROBE: NOT DETECTED
FLUBV RNA RESP QL NAA+PROBE: NOT DETECTED
GFR SERPLBLD CREATININE-BSD FMLA CKD-EPI: >90 ML/MIN/{1.73_M2}
GFR SERPLBLD CREATININE-BSD FMLA CKD-EPI: >90 ML/MIN/{1.73_M2}
GLUCOSE SERPL-MCNC: 79 MG/DL (ref 70–99)
GLUCOSE SERPL-MCNC: 93 MG/DL (ref 70–99)
GLUCOSE UR STRIP.AUTO-MCNC: 500 MG/DL
HCT VFR BLD AUTO: 35.1 % (ref 40.5–52.5)
HCT VFR BLD AUTO: 35.2 % (ref 40.5–52.5)
HGB BLD-MCNC: 11.1 G/DL (ref 13.5–17.5)
HGB BLD-MCNC: 11.3 G/DL (ref 13.5–17.5)
HGB UR QL STRIP.AUTO: NEGATIVE
KETONES UR STRIP.AUTO-MCNC: NEGATIVE MG/DL
LEUKOCYTE ESTERASE UR QL STRIP.AUTO: NEGATIVE
LYMPHOCYTES # BLD: 2 K/UL (ref 1–5.1)
LYMPHOCYTES # BLD: 2.6 K/UL (ref 1–5.1)
LYMPHOCYTES NFR BLD: 13.1 %
LYMPHOCYTES NFR BLD: 17.2 %
MCH RBC QN AUTO: 28.6 PG (ref 26–34)
MCH RBC QN AUTO: 28.6 PG (ref 26–34)
MCHC RBC AUTO-ENTMCNC: 31.7 G/DL (ref 31–36)
MCHC RBC AUTO-ENTMCNC: 32.2 G/DL (ref 31–36)
MCV RBC AUTO: 88.9 FL (ref 80–100)
MCV RBC AUTO: 90.3 FL (ref 80–100)
MONOCYTES # BLD: 1.2 K/UL (ref 0–1.3)
MONOCYTES # BLD: 1.2 K/UL (ref 0–1.3)
MONOCYTES NFR BLD: 7.5 %
MONOCYTES NFR BLD: 7.6 %
NEUTROPHILS # BLD: 11 K/UL (ref 1.7–7.7)
NEUTROPHILS # BLD: 11.9 K/UL (ref 1.7–7.7)
NEUTROPHILS NFR BLD: 71.8 %
NEUTROPHILS NFR BLD: 76.9 %
NITRITE UR QL STRIP.AUTO: NEGATIVE
NT-PROBNP SERPL-MCNC: 167 PG/ML (ref 0–124)
PH UR STRIP.AUTO: 6 [PH] (ref 5–8)
PLATELET # BLD AUTO: 304 K/UL (ref 135–450)
PLATELET # BLD AUTO: 308 K/UL (ref 135–450)
PMV BLD AUTO: 8.2 FL (ref 5–10.5)
PMV BLD AUTO: 8.8 FL (ref 5–10.5)
POTASSIUM SERPL-SCNC: 3.7 MMOL/L (ref 3.5–5.1)
POTASSIUM SERPL-SCNC: 4.2 MMOL/L (ref 3.5–5.1)
PROT SERPL-MCNC: 6.8 G/DL (ref 6.4–8.2)
PROT SERPL-MCNC: 7 G/DL (ref 6.4–8.2)
PROT UR STRIP.AUTO-MCNC: NEGATIVE MG/DL
RBC # BLD AUTO: 3.89 M/UL (ref 4.2–5.9)
RBC # BLD AUTO: 3.96 M/UL (ref 4.2–5.9)
SARS-COV-2 RNA RESP QL NAA+PROBE: NOT DETECTED
SODIUM SERPL-SCNC: 137 MMOL/L (ref 136–145)
SODIUM SERPL-SCNC: 141 MMOL/L (ref 136–145)
SP GR UR STRIP.AUTO: 1.01 (ref 1–1.03)
TROPONIN, HIGH SENSITIVITY: 70 NG/L (ref 0–22)
TROPONIN, HIGH SENSITIVITY: 75 NG/L (ref 0–22)
UA COMPLETE W REFLEX CULTURE PNL UR: ABNORMAL
UA DIPSTICK W REFLEX MICRO PNL UR: ABNORMAL
URN SPEC COLLECT METH UR: ABNORMAL
UROBILINOGEN UR STRIP-ACNC: 0.2 E.U./DL
WBC # BLD AUTO: 15.4 K/UL (ref 4–11)
WBC # BLD AUTO: 15.4 K/UL (ref 4–11)

## 2024-06-12 PROCEDURE — 87636 SARSCOV2 & INF A&B AMP PRB: CPT

## 2024-06-12 PROCEDURE — 99285 EMERGENCY DEPT VISIT HI MDM: CPT

## 2024-06-12 PROCEDURE — 93005 ELECTROCARDIOGRAM TRACING: CPT | Performed by: PHYSICIAN ASSISTANT

## 2024-06-12 PROCEDURE — 2580000003 HC RX 258: Performed by: PHYSICIAN ASSISTANT

## 2024-06-12 PROCEDURE — 80053 COMPREHEN METABOLIC PANEL: CPT

## 2024-06-12 PROCEDURE — 6370000000 HC RX 637 (ALT 250 FOR IP): Performed by: PHYSICIAN ASSISTANT

## 2024-06-12 PROCEDURE — 96374 THER/PROPH/DIAG INJ IV PUSH: CPT

## 2024-06-12 PROCEDURE — 71045 X-RAY EXAM CHEST 1 VIEW: CPT

## 2024-06-12 PROCEDURE — 85025 COMPLETE CBC W/AUTO DIFF WBC: CPT

## 2024-06-12 PROCEDURE — 6360000002 HC RX W HCPCS: Performed by: PHYSICIAN ASSISTANT

## 2024-06-12 PROCEDURE — 81003 URINALYSIS AUTO W/O SCOPE: CPT

## 2024-06-12 PROCEDURE — 71260 CT THORAX DX C+: CPT

## 2024-06-12 PROCEDURE — 83880 ASSAY OF NATRIURETIC PEPTIDE: CPT

## 2024-06-12 PROCEDURE — 36415 COLL VENOUS BLD VENIPUNCTURE: CPT

## 2024-06-12 PROCEDURE — 94761 N-INVAS EAR/PLS OXIMETRY MLT: CPT

## 2024-06-12 PROCEDURE — 94640 AIRWAY INHALATION TREATMENT: CPT

## 2024-06-12 PROCEDURE — 80307 DRUG TEST PRSMV CHEM ANLYZR: CPT

## 2024-06-12 PROCEDURE — 87449 NOS EACH ORGANISM AG IA: CPT

## 2024-06-12 PROCEDURE — 2700000000 HC OXYGEN THERAPY PER DAY

## 2024-06-12 PROCEDURE — 6360000004 HC RX CONTRAST MEDICATION: Performed by: PHYSICIAN ASSISTANT

## 2024-06-12 PROCEDURE — 96375 TX/PRO/DX INJ NEW DRUG ADDON: CPT

## 2024-06-12 PROCEDURE — 84484 ASSAY OF TROPONIN QUANT: CPT

## 2024-06-12 RX ORDER — ONDANSETRON 2 MG/ML
4 INJECTION INTRAMUSCULAR; INTRAVENOUS ONCE
Status: DISCONTINUED | OUTPATIENT
Start: 2024-06-12 | End: 2024-06-12

## 2024-06-12 RX ORDER — IPRATROPIUM BROMIDE AND ALBUTEROL SULFATE 2.5; .5 MG/3ML; MG/3ML
1 SOLUTION RESPIRATORY (INHALATION) ONCE
Status: COMPLETED | OUTPATIENT
Start: 2024-06-12 | End: 2024-06-12

## 2024-06-12 RX ORDER — ASPIRIN 325 MG
325 TABLET ORAL ONCE
Status: COMPLETED | OUTPATIENT
Start: 2024-06-12 | End: 2024-06-13

## 2024-06-12 RX ORDER — IPRATROPIUM BROMIDE AND ALBUTEROL SULFATE 2.5; .5 MG/3ML; MG/3ML
1 SOLUTION RESPIRATORY (INHALATION) ONCE
Status: COMPLETED | OUTPATIENT
Start: 2024-06-12 | End: 2024-06-13

## 2024-06-12 RX ORDER — KETOROLAC TROMETHAMINE 15 MG/ML
15 INJECTION, SOLUTION INTRAMUSCULAR; INTRAVENOUS ONCE
Status: DISCONTINUED | OUTPATIENT
Start: 2024-06-12 | End: 2024-06-12

## 2024-06-12 RX ADMIN — IOPAMIDOL 75 ML: 755 INJECTION, SOLUTION INTRAVENOUS at 20:47

## 2024-06-12 RX ADMIN — IPRATROPIUM BROMIDE AND ALBUTEROL SULFATE 1 DOSE: 2.5; .5 SOLUTION RESPIRATORY (INHALATION) at 20:06

## 2024-06-12 RX ADMIN — WATER 125 MG: 1 INJECTION INTRAMUSCULAR; INTRAVENOUS; SUBCUTANEOUS at 20:30

## 2024-06-12 ASSESSMENT — LIFESTYLE VARIABLES
HOW OFTEN DO YOU HAVE A DRINK CONTAINING ALCOHOL: NEVER
HOW MANY STANDARD DRINKS CONTAINING ALCOHOL DO YOU HAVE ON A TYPICAL DAY: PATIENT DOES NOT DRINK

## 2024-06-12 ASSESSMENT — PAIN SCALES - GENERAL: PAINLEVEL_OUTOF10: 8

## 2024-06-12 ASSESSMENT — PAIN - FUNCTIONAL ASSESSMENT: PAIN_FUNCTIONAL_ASSESSMENT: 0-10

## 2024-06-12 NOTE — ED PROVIDER NOTES
[facility] ED  EMERGENCY DEPARTMENT ENCOUNTER        Pt Name: Osvaldo Noble  MRN: [id number]  Birthdate 1975  Date of evaluation: 6/12/2024  Provider: Heather Shen PA-C  PCP: Bahman Scales MD  Note Started: 7:26 PM EDT 6/12/24      DEMETRIUS. I have evaluated this patient.  With my attending physician Dr. Falk      CHIEF COMPLAINT       Chief Complaint   Patient presents with    Neurologic Problem     Pt state he had surgery on his spine a year ago.  Has been having problems since with jerking and numbness.  Pt states he did not fall but arms and legs gave out and he started spasming and could not get up       HISTORY OF PRESENT ILLNESS: 1 or more Elements     History From: Patient            Chief Complaint: Shortness of breath    Osvaldo Noble is a 48 y.o. male who presents because he tells me for couple weeks now he has had wheezing shortness of breath productive cough.  He denies chest pain fever chills vomiting diarrhea constipation.  He states he thinks he has pneumonia.  He does not wear oxygen at home.  He states his wife called 911 because he had another one of his \"episodes \".  Where he stands up to walk somewhere and his arms and legs bilaterally shake and \"collapsed \".  He is conscious the whole time.  He did not fall or hit his head.  He states he is has been happening very frequently for the past year he sees neurology.  He currently denies extremity weakness paresthesias headache.    Nursing Notes were all reviewed and agreed with or any disagreements were addressed in the HPI.    REVIEW OF SYSTEMS :      Review of Systems    Positives and Pertinent negatives as per HPI.     SURGICAL HISTORY     Past Surgical History:   Procedure Laterality Date    CERVICAL SPINE SURGERY  07/10/2023    cervical spine fusion surgery,Pt has 2 plates & 4 screws.    CORONARY ANGIOPLASTY WITH STENT PLACEMENT      2019    HERNIA REPAIR      KNEE SURGERY Left     ACL    PTCA  12/30/2018

## 2024-06-13 ENCOUNTER — APPOINTMENT (OUTPATIENT)
Age: 49
DRG: 190 | End: 2024-06-13
Attending: INTERNAL MEDICINE
Payer: COMMERCIAL

## 2024-06-13 PROBLEM — J96.01 ACUTE HYPOXIC RESPIRATORY FAILURE (HCC): Status: ACTIVE | Noted: 2024-06-13

## 2024-06-13 PROBLEM — R79.89 ELEVATED TROPONIN: Status: ACTIVE | Noted: 2024-06-13

## 2024-06-13 LAB
AMPHETAMINES UR QL SCN>1000 NG/ML: NORMAL
BARBITURATES UR QL SCN>200 NG/ML: NORMAL
BASE EXCESS BLDV CALC-SCNC: 2.3 MMOL/L (ref -3–3)
BENZODIAZ UR QL SCN>200 NG/ML: NORMAL
CANNABINOIDS UR QL SCN>50 NG/ML: NORMAL
CHOLEST SERPL-MCNC: 184 MG/DL (ref 0–199)
CO2 BLDV-SCNC: 28 MMOL/L
COCAINE UR QL SCN: NORMAL
COHGB MFR BLDV: 4 % (ref 0–1.5)
CRP SERPL-MCNC: 76.4 MG/L (ref 0–5.1)
DRUG SCREEN COMMENT UR-IMP: NORMAL
ECHO AO ASC DIAM: 3.3 CM
ECHO AO ASCENDING AORTA INDEX: 1.51 CM/M2
ECHO AO ROOT DIAM: 3.8 CM
ECHO AO ROOT INDEX: 1.74 CM/M2
ECHO AV MEAN GRADIENT: 3 MMHG
ECHO AV MEAN VELOCITY: 1 M/S
ECHO AV PEAK GRADIENT: 6 MMHG
ECHO AV PEAK VELOCITY: 1.2 M/S
ECHO AV VELOCITY RATIO: 0.83
ECHO AV VTI: 26.2 CM
ECHO BSA: 2.24 M2
ECHO EST RA PRESSURE: 8 MMHG
ECHO IVC EXP: 1.7 CM
ECHO IVC INSP: 0.9 CM
ECHO LA AREA 2C: 16 CM2
ECHO LA AREA 4C: 16 CM2
ECHO LA MAJOR AXIS: 6.1 CM
ECHO LA MINOR AXIS: 5.1 CM
ECHO LA VOL BP: 39 ML (ref 18–58)
ECHO LA VOL MOD A2C: 39 ML (ref 18–58)
ECHO LA VOL MOD A4C: 48 ML (ref 18–58)
ECHO LA VOL/BSA BIPLANE: 18 ML/M2 (ref 16–34)
ECHO LA VOLUME INDEX MOD A2C: 18 ML/M2 (ref 16–34)
ECHO LA VOLUME INDEX MOD A4C: 22 ML/M2 (ref 16–34)
ECHO LV E' LATERAL VELOCITY: 16 CM/S
ECHO LV E' SEPTAL VELOCITY: 10 CM/S
ECHO LV FRACTIONAL SHORTENING: 38 % (ref 28–44)
ECHO LV INTERNAL DIMENSION DIASTOLE INDEX: 2.51 CM/M2
ECHO LV INTERNAL DIMENSION DIASTOLIC: 5.5 CM (ref 4.2–5.9)
ECHO LV INTERNAL DIMENSION SYSTOLIC INDEX: 1.55 CM/M2
ECHO LV INTERNAL DIMENSION SYSTOLIC: 3.4 CM
ECHO LV ISOVOLUMETRIC RELAXATION TIME (IVRT): 108 MS
ECHO LV IVSD: 1 CM (ref 0.6–1)
ECHO LV MASS 2D: 213.2 G (ref 88–224)
ECHO LV MASS INDEX 2D: 97.3 G/M2 (ref 49–115)
ECHO LV POSTERIOR WALL DIASTOLIC: 1 CM (ref 0.6–1)
ECHO LV RELATIVE WALL THICKNESS RATIO: 0.36
ECHO LVOT AV VTI INDEX: 0.81
ECHO LVOT MEAN GRADIENT: 2 MMHG
ECHO LVOT PEAK GRADIENT: 4 MMHG
ECHO LVOT PEAK VELOCITY: 1 M/S
ECHO LVOT VTI: 21.2 CM
ECHO MV A VELOCITY: 1 M/S
ECHO MV E DECELERATION TIME (DT): 198 MS
ECHO MV E VELOCITY: 1 M/S
ECHO MV E/A RATIO: 1
ECHO MV E/E' LATERAL: 6.25
ECHO MV E/E' RATIO (AVERAGED): 8.13
ECHO MV E/E' SEPTAL: 10
ECHO MV LVOT VTI INDEX: 1.07
ECHO MV MAX VELOCITY: 1 M/S
ECHO MV MEAN GRADIENT: 4 MMHG
ECHO MV MEAN VELOCITY: 0.8 M/S
ECHO MV PEAK GRADIENT: 4 MMHG
ECHO MV VTI: 22.6 CM
ECHO PV MAX VELOCITY: 1 M/S
ECHO PV MEAN GRADIENT: 2 MMHG
ECHO PV MEAN VELOCITY: 0.7 M/S
ECHO PV PEAK GRADIENT: 4 MMHG
ECHO PV VTI: 22.3 CM
ECHO RA AREA 4C: 24.7 CM2
ECHO RA END SYSTOLIC VOLUME APICAL 4 CHAMBER INDEX BSA: 39 ML/M2
ECHO RA VOLUME: 86 ML
ECHO RIGHT VENTRICULAR SYSTOLIC PRESSURE (RVSP): 27 MMHG
ECHO RV BASAL DIMENSION: 5.1 CM
ECHO RV FREE WALL PEAK S': 16 CM/S
ECHO RV LONGITUDINAL DIMENSION: 8.7 CM
ECHO RV MID DIMENSION: 4.1 CM
ECHO RV TAPSE: 3.6 CM (ref 1.7–?)
ECHO TV REGURGITANT MAX VELOCITY: 2.2 M/S
ECHO TV REGURGITANT PEAK GRADIENT: 19 MMHG
EKG ATRIAL RATE: 82 BPM
EKG DIAGNOSIS: NORMAL
EKG P AXIS: 45 DEGREES
EKG P-R INTERVAL: 234 MS
EKG Q-T INTERVAL: 434 MS
EKG QRS DURATION: 138 MS
EKG QTC CALCULATION (BAZETT): 507 MS
EKG R AXIS: -58 DEGREES
EKG T AXIS: 48 DEGREES
EKG VENTRICULAR RATE: 82 BPM
FENTANYL SCREEN, URINE: NORMAL
HCO3 BLDV-SCNC: 26.9 MMOL/L (ref 23–29)
HDLC SERPL-MCNC: 44 MG/DL (ref 40–60)
LDLC SERPL CALC-MCNC: 97 MG/DL
METHADONE UR QL SCN>300 NG/ML: NORMAL
METHGB MFR BLDV: 0.3 %
O2 CT VFR BLDV CALC: 16 VOL %
O2 THERAPY: ABNORMAL
OPIATES UR QL SCN>300 NG/ML: NORMAL
OXYCODONE UR QL SCN: NORMAL
PCO2 BLDV: 41.7 MMHG (ref 40–50)
PCP UR QL SCN>25 NG/ML: NORMAL
PH BLDV: 7.43 [PH] (ref 7.35–7.45)
PH UR STRIP: 6 [PH]
PO2 BLDV: 97.2 MMHG (ref 25–40)
PROCALCITONIN SERPL IA-MCNC: 0.1 NG/ML (ref 0–0.15)
SAO2 % BLDV: 98 %
TRIGL SERPL-MCNC: 217 MG/DL (ref 0–150)
VLDLC SERPL CALC-MCNC: 43 MG/DL

## 2024-06-13 PROCEDURE — 36415 COLL VENOUS BLD VENIPUNCTURE: CPT

## 2024-06-13 PROCEDURE — 80061 LIPID PANEL: CPT

## 2024-06-13 PROCEDURE — 94669 MECHANICAL CHEST WALL OSCILL: CPT

## 2024-06-13 PROCEDURE — 1200000000 HC SEMI PRIVATE

## 2024-06-13 PROCEDURE — 6370000000 HC RX 637 (ALT 250 FOR IP): Performed by: STUDENT IN AN ORGANIZED HEALTH CARE EDUCATION/TRAINING PROGRAM

## 2024-06-13 PROCEDURE — 94010 BREATHING CAPACITY TEST: CPT

## 2024-06-13 PROCEDURE — 5A0935A ASSISTANCE WITH RESPIRATORY VENTILATION, LESS THAN 24 CONSECUTIVE HOURS, HIGH NASAL FLOW/VELOCITY: ICD-10-PCS | Performed by: PSYCHIATRY & NEUROLOGY

## 2024-06-13 PROCEDURE — 99222 1ST HOSP IP/OBS MODERATE 55: CPT | Performed by: STUDENT IN AN ORGANIZED HEALTH CARE EDUCATION/TRAINING PROGRAM

## 2024-06-13 PROCEDURE — 6370000000 HC RX 637 (ALT 250 FOR IP): Performed by: INTERNAL MEDICINE

## 2024-06-13 PROCEDURE — 94761 N-INVAS EAR/PLS OXIMETRY MLT: CPT

## 2024-06-13 PROCEDURE — 2580000003 HC RX 258: Performed by: PHYSICIAN ASSISTANT

## 2024-06-13 PROCEDURE — 99223 1ST HOSP IP/OBS HIGH 75: CPT | Performed by: INTERNAL MEDICINE

## 2024-06-13 PROCEDURE — 2580000003 HC RX 258: Performed by: INTERNAL MEDICINE

## 2024-06-13 PROCEDURE — 2500000003 HC RX 250 WO HCPCS: Performed by: PHYSICIAN ASSISTANT

## 2024-06-13 PROCEDURE — 86140 C-REACTIVE PROTEIN: CPT

## 2024-06-13 PROCEDURE — 93010 ELECTROCARDIOGRAM REPORT: CPT | Performed by: INTERNAL MEDICINE

## 2024-06-13 PROCEDURE — 6360000002 HC RX W HCPCS: Performed by: INTERNAL MEDICINE

## 2024-06-13 PROCEDURE — 93306 TTE W/DOPPLER COMPLETE: CPT

## 2024-06-13 PROCEDURE — 2700000000 HC OXYGEN THERAPY PER DAY

## 2024-06-13 PROCEDURE — 6370000000 HC RX 637 (ALT 250 FOR IP): Performed by: NURSE PRACTITIONER

## 2024-06-13 PROCEDURE — 84145 PROCALCITONIN (PCT): CPT

## 2024-06-13 PROCEDURE — 93306 TTE W/DOPPLER COMPLETE: CPT | Performed by: INTERNAL MEDICINE

## 2024-06-13 PROCEDURE — 6370000000 HC RX 637 (ALT 250 FOR IP): Performed by: PHYSICIAN ASSISTANT

## 2024-06-13 PROCEDURE — 82803 BLOOD GASES ANY COMBINATION: CPT

## 2024-06-13 PROCEDURE — 94640 AIRWAY INHALATION TREATMENT: CPT

## 2024-06-13 RX ORDER — ENOXAPARIN SODIUM 100 MG/ML
30 INJECTION SUBCUTANEOUS 2 TIMES DAILY
Status: DISCONTINUED | OUTPATIENT
Start: 2024-06-13 | End: 2024-06-16 | Stop reason: HOSPADM

## 2024-06-13 RX ORDER — UREA 10 %
1000 LOTION (ML) TOPICAL DAILY
Status: DISCONTINUED | OUTPATIENT
Start: 2024-06-13 | End: 2024-06-16 | Stop reason: HOSPADM

## 2024-06-13 RX ORDER — MEMANTINE HYDROCHLORIDE 5 MG/1
10 TABLET ORAL 2 TIMES DAILY
Status: DISCONTINUED | OUTPATIENT
Start: 2024-06-13 | End: 2024-06-16 | Stop reason: HOSPADM

## 2024-06-13 RX ORDER — GABAPENTIN 400 MG/1
400 CAPSULE ORAL 3 TIMES DAILY
Status: DISCONTINUED | OUTPATIENT
Start: 2024-06-13 | End: 2024-06-16 | Stop reason: HOSPADM

## 2024-06-13 RX ORDER — BACLOFEN 10 MG/1
10 TABLET ORAL 3 TIMES DAILY
Status: DISCONTINUED | OUTPATIENT
Start: 2024-06-13 | End: 2024-06-14

## 2024-06-13 RX ORDER — CLOPIDOGREL BISULFATE 75 MG/1
75 TABLET ORAL DAILY
Status: DISCONTINUED | OUTPATIENT
Start: 2024-06-13 | End: 2024-06-16 | Stop reason: HOSPADM

## 2024-06-13 RX ORDER — ONDANSETRON 2 MG/ML
4 INJECTION INTRAMUSCULAR; INTRAVENOUS EVERY 6 HOURS PRN
Status: DISCONTINUED | OUTPATIENT
Start: 2024-06-13 | End: 2024-06-16 | Stop reason: HOSPADM

## 2024-06-13 RX ORDER — ENOXAPARIN SODIUM 100 MG/ML
40 INJECTION SUBCUTANEOUS DAILY
Status: DISCONTINUED | OUTPATIENT
Start: 2024-06-13 | End: 2024-06-13

## 2024-06-13 RX ORDER — IPRATROPIUM BROMIDE AND ALBUTEROL SULFATE 2.5; .5 MG/3ML; MG/3ML
1 SOLUTION RESPIRATORY (INHALATION) EVERY 4 HOURS PRN
Status: DISCONTINUED | OUTPATIENT
Start: 2024-06-13 | End: 2024-06-16 | Stop reason: HOSPADM

## 2024-06-13 RX ORDER — ALPRAZOLAM 0.5 MG/1
0.5 TABLET ORAL 3 TIMES DAILY PRN
Status: DISCONTINUED | OUTPATIENT
Start: 2024-06-13 | End: 2024-06-16 | Stop reason: HOSPADM

## 2024-06-13 RX ORDER — FERROUS SULFATE 325(65) MG
325 TABLET ORAL
Status: DISCONTINUED | OUTPATIENT
Start: 2024-06-13 | End: 2024-06-16 | Stop reason: HOSPADM

## 2024-06-13 RX ORDER — OXYCODONE AND ACETAMINOPHEN 7.5; 325 MG/1; MG/1
1 TABLET ORAL EVERY 6 HOURS PRN
Status: DISCONTINUED | OUTPATIENT
Start: 2024-06-13 | End: 2024-06-16 | Stop reason: HOSPADM

## 2024-06-13 RX ORDER — ASPIRIN 81 MG/1
81 TABLET, CHEWABLE ORAL DAILY
Status: DISCONTINUED | OUTPATIENT
Start: 2024-06-13 | End: 2024-06-16 | Stop reason: HOSPADM

## 2024-06-13 RX ORDER — TAMSULOSIN HYDROCHLORIDE 0.4 MG/1
0.4 CAPSULE ORAL 2 TIMES DAILY
Status: DISCONTINUED | OUTPATIENT
Start: 2024-06-13 | End: 2024-06-16 | Stop reason: HOSPADM

## 2024-06-13 RX ORDER — ROPINIROLE 1 MG/1
1 TABLET, FILM COATED ORAL NIGHTLY
Status: DISCONTINUED | OUTPATIENT
Start: 2024-06-13 | End: 2024-06-16 | Stop reason: HOSPADM

## 2024-06-13 RX ORDER — TRAZODONE HYDROCHLORIDE 100 MG/1
200 TABLET ORAL NIGHTLY
Status: DISCONTINUED | OUTPATIENT
Start: 2024-06-13 | End: 2024-06-16 | Stop reason: HOSPADM

## 2024-06-13 RX ORDER — ONDANSETRON 4 MG/1
4 TABLET, ORALLY DISINTEGRATING ORAL EVERY 8 HOURS PRN
Status: DISCONTINUED | OUTPATIENT
Start: 2024-06-13 | End: 2024-06-16 | Stop reason: HOSPADM

## 2024-06-13 RX ORDER — SODIUM CHLORIDE 0.9 % (FLUSH) 0.9 %
5-40 SYRINGE (ML) INJECTION EVERY 12 HOURS SCHEDULED
Status: DISCONTINUED | OUTPATIENT
Start: 2024-06-13 | End: 2024-06-16 | Stop reason: HOSPADM

## 2024-06-13 RX ORDER — NICOTINE 21 MG/24HR
1 PATCH, TRANSDERMAL 24 HOURS TRANSDERMAL DAILY
Status: DISCONTINUED | OUTPATIENT
Start: 2024-06-13 | End: 2024-06-16 | Stop reason: HOSPADM

## 2024-06-13 RX ORDER — SODIUM CHLORIDE 9 MG/ML
INJECTION, SOLUTION INTRAVENOUS PRN
Status: DISCONTINUED | OUTPATIENT
Start: 2024-06-13 | End: 2024-06-16 | Stop reason: HOSPADM

## 2024-06-13 RX ORDER — AMLODIPINE BESYLATE 2.5 MG/1
2.5 TABLET ORAL DAILY
Status: DISCONTINUED | OUTPATIENT
Start: 2024-06-13 | End: 2024-06-16 | Stop reason: HOSPADM

## 2024-06-13 RX ORDER — INDOMETHACIN 25 MG/1
50 CAPSULE ORAL 2 TIMES DAILY PRN
Status: DISCONTINUED | OUTPATIENT
Start: 2024-06-13 | End: 2024-06-16 | Stop reason: HOSPADM

## 2024-06-13 RX ORDER — PREDNISONE 20 MG/1
40 TABLET ORAL DAILY
Status: DISCONTINUED | OUTPATIENT
Start: 2024-06-13 | End: 2024-06-16 | Stop reason: HOSPADM

## 2024-06-13 RX ORDER — LANOLIN ALCOHOL/MO/W.PET/CERES
100 CREAM (GRAM) TOPICAL DAILY
Status: DISCONTINUED | OUTPATIENT
Start: 2024-06-13 | End: 2024-06-16 | Stop reason: HOSPADM

## 2024-06-13 RX ORDER — POLYETHYLENE GLYCOL 3350 17 G/17G
17 POWDER, FOR SOLUTION ORAL DAILY PRN
Status: DISCONTINUED | OUTPATIENT
Start: 2024-06-13 | End: 2024-06-16 | Stop reason: HOSPADM

## 2024-06-13 RX ORDER — ACETAMINOPHEN 325 MG/1
650 TABLET ORAL EVERY 6 HOURS PRN
Status: DISCONTINUED | OUTPATIENT
Start: 2024-06-13 | End: 2024-06-16 | Stop reason: HOSPADM

## 2024-06-13 RX ORDER — IPRATROPIUM BROMIDE AND ALBUTEROL SULFATE 2.5; .5 MG/3ML; MG/3ML
1 SOLUTION RESPIRATORY (INHALATION)
Status: DISCONTINUED | OUTPATIENT
Start: 2024-06-13 | End: 2024-06-14

## 2024-06-13 RX ORDER — SODIUM CHLORIDE 0.9 % (FLUSH) 0.9 %
5-40 SYRINGE (ML) INJECTION PRN
Status: DISCONTINUED | OUTPATIENT
Start: 2024-06-13 | End: 2024-06-16 | Stop reason: HOSPADM

## 2024-06-13 RX ORDER — FOLIC ACID 1 MG/1
1 TABLET ORAL DAILY
Status: DISCONTINUED | OUTPATIENT
Start: 2024-06-13 | End: 2024-06-16 | Stop reason: HOSPADM

## 2024-06-13 RX ORDER — PRAVASTATIN SODIUM 40 MG
40 TABLET ORAL NIGHTLY
Status: DISCONTINUED | OUTPATIENT
Start: 2024-06-13 | End: 2024-06-16 | Stop reason: HOSPADM

## 2024-06-13 RX ORDER — IPRATROPIUM BROMIDE AND ALBUTEROL SULFATE 2.5; .5 MG/3ML; MG/3ML
1 SOLUTION RESPIRATORY (INHALATION)
Status: DISCONTINUED | OUTPATIENT
Start: 2024-06-13 | End: 2024-06-13

## 2024-06-13 RX ORDER — VENLAFAXINE HYDROCHLORIDE 75 MG/1
150 CAPSULE, EXTENDED RELEASE ORAL DAILY
Status: DISCONTINUED | OUTPATIENT
Start: 2024-06-13 | End: 2024-06-16 | Stop reason: HOSPADM

## 2024-06-13 RX ORDER — EZETIMIBE 10 MG/1
10 TABLET ORAL DAILY
Status: DISCONTINUED | OUTPATIENT
Start: 2024-06-13 | End: 2024-06-16 | Stop reason: HOSPADM

## 2024-06-13 RX ORDER — PANTOPRAZOLE SODIUM 40 MG/1
40 TABLET, DELAYED RELEASE ORAL
Status: DISCONTINUED | OUTPATIENT
Start: 2024-06-13 | End: 2024-06-16 | Stop reason: HOSPADM

## 2024-06-13 RX ORDER — ENOXAPARIN SODIUM 100 MG/ML
30 INJECTION SUBCUTANEOUS 2 TIMES DAILY
Status: DISCONTINUED | OUTPATIENT
Start: 2024-06-13 | End: 2024-06-13

## 2024-06-13 RX ORDER — ALLOPURINOL 300 MG/1
600 TABLET ORAL DAILY
Status: DISCONTINUED | OUTPATIENT
Start: 2024-06-13 | End: 2024-06-16 | Stop reason: HOSPADM

## 2024-06-13 RX ORDER — POTASSIUM CHLORIDE 7.45 MG/ML
10 INJECTION INTRAVENOUS PRN
Status: DISCONTINUED | OUTPATIENT
Start: 2024-06-13 | End: 2024-06-16 | Stop reason: HOSPADM

## 2024-06-13 RX ORDER — POTASSIUM CHLORIDE 20 MEQ/1
40 TABLET, EXTENDED RELEASE ORAL PRN
Status: DISCONTINUED | OUTPATIENT
Start: 2024-06-13 | End: 2024-06-16 | Stop reason: HOSPADM

## 2024-06-13 RX ORDER — ACETAMINOPHEN 650 MG/1
650 SUPPOSITORY RECTAL EVERY 6 HOURS PRN
Status: DISCONTINUED | OUTPATIENT
Start: 2024-06-13 | End: 2024-06-16 | Stop reason: HOSPADM

## 2024-06-13 RX ORDER — MAGNESIUM SULFATE IN WATER 40 MG/ML
2000 INJECTION, SOLUTION INTRAVENOUS PRN
Status: DISCONTINUED | OUTPATIENT
Start: 2024-06-13 | End: 2024-06-16 | Stop reason: HOSPADM

## 2024-06-13 RX ADMIN — FOLIC ACID 1 MG: 1 TABLET ORAL at 09:39

## 2024-06-13 RX ADMIN — IPRATROPIUM BROMIDE AND ALBUTEROL SULFATE 1 DOSE: 2.5; .5 SOLUTION RESPIRATORY (INHALATION) at 11:40

## 2024-06-13 RX ADMIN — CEFTRIAXONE SODIUM 1000 MG: 1 INJECTION, POWDER, FOR SOLUTION INTRAMUSCULAR; INTRAVENOUS at 10:02

## 2024-06-13 RX ADMIN — BACLOFEN 10 MG: 10 TABLET ORAL at 09:39

## 2024-06-13 RX ADMIN — ALPRAZOLAM 0.5 MG: 0.5 TABLET ORAL at 03:36

## 2024-06-13 RX ADMIN — PANTOPRAZOLE SODIUM 40 MG: 40 TABLET, DELAYED RELEASE ORAL at 07:23

## 2024-06-13 RX ADMIN — OXYCODONE HYDROCHLORIDE AND ACETAMINOPHEN 1 TABLET: 7.5; 325 TABLET ORAL at 09:45

## 2024-06-13 RX ADMIN — TAMSULOSIN HYDROCHLORIDE 0.4 MG: 0.4 CAPSULE ORAL at 20:35

## 2024-06-13 RX ADMIN — ASPIRIN 325 MG: 325 TABLET ORAL at 00:06

## 2024-06-13 RX ADMIN — BACLOFEN 10 MG: 10 TABLET ORAL at 14:15

## 2024-06-13 RX ADMIN — DOXYCYCLINE 100 MG: 100 INJECTION, POWDER, LYOPHILIZED, FOR SOLUTION INTRAVENOUS at 00:07

## 2024-06-13 RX ADMIN — DOXYCYCLINE 100 MG: 100 INJECTION, POWDER, LYOPHILIZED, FOR SOLUTION INTRAVENOUS at 10:01

## 2024-06-13 RX ADMIN — ASPIRIN 81 MG: 81 TABLET, CHEWABLE ORAL at 09:38

## 2024-06-13 RX ADMIN — NYSTATIN 500000 UNITS: 100000 SUSPENSION ORAL at 20:35

## 2024-06-13 RX ADMIN — BACLOFEN 10 MG: 10 TABLET ORAL at 20:35

## 2024-06-13 RX ADMIN — GABAPENTIN 400 MG: 400 CAPSULE ORAL at 20:35

## 2024-06-13 RX ADMIN — PRAVASTATIN SODIUM 40 MG: 40 TABLET ORAL at 03:36

## 2024-06-13 RX ADMIN — MEMANTINE HYDROCHLORIDE 10 MG: 5 TABLET ORAL at 09:38

## 2024-06-13 RX ADMIN — EZETIMIBE 10 MG: 10 TABLET ORAL at 09:38

## 2024-06-13 RX ADMIN — MEMANTINE HYDROCHLORIDE 10 MG: 5 TABLET ORAL at 20:35

## 2024-06-13 RX ADMIN — ENOXAPARIN SODIUM 30 MG: 100 INJECTION SUBCUTANEOUS at 10:15

## 2024-06-13 RX ADMIN — PRAVASTATIN SODIUM 40 MG: 40 TABLET ORAL at 20:35

## 2024-06-13 RX ADMIN — VENLAFAXINE HYDROCHLORIDE 150 MG: 75 CAPSULE, EXTENDED RELEASE ORAL at 09:38

## 2024-06-13 RX ADMIN — CLOPIDOGREL BISULFATE 75 MG: 75 TABLET ORAL at 09:38

## 2024-06-13 RX ADMIN — GABAPENTIN 400 MG: 400 CAPSULE ORAL at 09:38

## 2024-06-13 RX ADMIN — SODIUM CHLORIDE, PRESERVATIVE FREE 10 ML: 5 INJECTION INTRAVENOUS at 20:35

## 2024-06-13 RX ADMIN — IPRATROPIUM BROMIDE AND ALBUTEROL SULFATE 1 DOSE: 2.5; .5 SOLUTION RESPIRATORY (INHALATION) at 20:15

## 2024-06-13 RX ADMIN — TRAZODONE HYDROCHLORIDE 200 MG: 100 TABLET ORAL at 20:35

## 2024-06-13 RX ADMIN — ENOXAPARIN SODIUM 30 MG: 100 INJECTION SUBCUTANEOUS at 20:35

## 2024-06-13 RX ADMIN — OXYCODONE HYDROCHLORIDE AND ACETAMINOPHEN 1 TABLET: 7.5; 325 TABLET ORAL at 03:36

## 2024-06-13 RX ADMIN — IPRATROPIUM BROMIDE AND ALBUTEROL SULFATE 1 DOSE: 2.5; .5 SOLUTION RESPIRATORY (INHALATION) at 00:06

## 2024-06-13 RX ADMIN — PREDNISONE 40 MG: 20 TABLET ORAL at 09:38

## 2024-06-13 RX ADMIN — Medication 100 MG: at 09:38

## 2024-06-13 RX ADMIN — GABAPENTIN 400 MG: 400 CAPSULE ORAL at 14:14

## 2024-06-13 RX ADMIN — FERROUS SULFATE TAB 325 MG (65 MG ELEMENTAL FE) 325 MG: 325 (65 FE) TAB at 09:38

## 2024-06-13 RX ADMIN — ROPINIROLE HYDROCHLORIDE 1 MG: 1 TABLET, FILM COATED ORAL at 20:35

## 2024-06-13 RX ADMIN — Medication 1000 MCG: at 09:38

## 2024-06-13 RX ADMIN — OXYCODONE HYDROCHLORIDE AND ACETAMINOPHEN 1 TABLET: 7.5; 325 TABLET ORAL at 18:12

## 2024-06-13 RX ADMIN — ALLOPURINOL 600 MG: 300 TABLET ORAL at 09:39

## 2024-06-13 RX ADMIN — IPRATROPIUM BROMIDE AND ALBUTEROL SULFATE 1 DOSE: 2.5; .5 SOLUTION RESPIRATORY (INHALATION) at 15:20

## 2024-06-13 RX ADMIN — TAMSULOSIN HYDROCHLORIDE 0.4 MG: 0.4 CAPSULE ORAL at 09:37

## 2024-06-13 RX ADMIN — IPRATROPIUM BROMIDE AND ALBUTEROL SULFATE 1 DOSE: 2.5; .5 SOLUTION RESPIRATORY (INHALATION) at 08:39

## 2024-06-13 ASSESSMENT — PAIN DESCRIPTION - PAIN TYPE
TYPE: CHRONIC PAIN
TYPE: CHRONIC PAIN

## 2024-06-13 ASSESSMENT — PAIN DESCRIPTION - ORIENTATION
ORIENTATION: MID
ORIENTATION: MID
ORIENTATION: MID;POSTERIOR

## 2024-06-13 ASSESSMENT — COPD QUESTIONNAIRES
QUESTION4_WALKINCLINE: 1
QUESTION6_LEAVINGHOUSE: 1
QUESTION1_COUGHFREQUENCY: 0
QUESTION7_SLEEPQUALITY: 3
CAT_TOTALSCORE: 10
QUESTION5_HOMEACTIVITIES: 1
QUESTION3_CHESTTIGHTNESS: 1
QUESTION8_ENERGYLEVEL: 3
QUESTION2_CHESTPHLEGM: 0

## 2024-06-13 ASSESSMENT — PAIN DESCRIPTION - LOCATION
LOCATION: BACK
LOCATION: NECK;SHOULDER
LOCATION: NECK;SHOULDER;HEAD

## 2024-06-13 ASSESSMENT — PAIN DESCRIPTION - DESCRIPTORS
DESCRIPTORS: ACHING
DESCRIPTORS: THROBBING
DESCRIPTORS: THROBBING

## 2024-06-13 ASSESSMENT — PAIN DESCRIPTION - FREQUENCY
FREQUENCY: CONTINUOUS
FREQUENCY: CONTINUOUS

## 2024-06-13 ASSESSMENT — LIFESTYLE VARIABLES
HOW MANY STANDARD DRINKS CONTAINING ALCOHOL DO YOU HAVE ON A TYPICAL DAY: PATIENT DOES NOT DRINK
HOW OFTEN DO YOU HAVE A DRINK CONTAINING ALCOHOL: NEVER

## 2024-06-13 ASSESSMENT — PAIN SCALES - GENERAL
PAINLEVEL_OUTOF10: 8
PAINLEVEL_OUTOF10: 5
PAINLEVEL_OUTOF10: 8
PAINLEVEL_OUTOF10: 7
PAINLEVEL_OUTOF10: 8
PAINLEVEL_OUTOF10: 5

## 2024-06-13 ASSESSMENT — PAIN - FUNCTIONAL ASSESSMENT
PAIN_FUNCTIONAL_ASSESSMENT: PREVENTS OR INTERFERES SOME ACTIVE ACTIVITIES AND ADLS
PAIN_FUNCTIONAL_ASSESSMENT: PREVENTS OR INTERFERES WITH MANY ACTIVE NOT PASSIVE ACTIVITIES

## 2024-06-13 NOTE — ACP (ADVANCE CARE PLANNING)
Advance Care Planning     Advance Care Planning Inpatient Note  [facility] Department    Today's Date: 6/13/2024  Unit: [facility] [place] [facility]ICAL-[facility]ICAL    Received request from IDT Member.  Upon review of chart and communication with care team, patient's decision making abilities are not in question.. Patient was/were present in the room during visit.    Goals of ACP Conversation:  Discuss advance care planning documents    Health Care Decision Makers:       Primary Decision Maker: Briseida Noble - [facility] - [phone]  Summary:  Completed New Documents  Verified Healthcare Decision Maker    Advance Care Planning Documents (Patient Wishes):  Healthcare Power of /Advance Directive Appointment of Health Care Agent  Living Will/Advance Directive     Assessment:  Documents completed IAW patient's guidance    Interventions:  Assisted in the completion of documents according to patient's wishes at this time    Care Preferences Communicated:   No    Outcomes/Plan:  ACP Discussion: Completed  New advance directive completed.    Electronically signed by ERNESTINE Warner on 6/13/2024 at 3:52 PM

## 2024-06-13 NOTE — CONSULTS
CARDIOLOGY CONSULTATION      Patient Name: Osvaldo Noble  Date of admission: 6/12/2024  6:25 PM  Admission Dx: Shortness of breath [R06.02]  Hypoxemia [R09.02]  COPD exacerbation (HCC) [J44.1]  Acute respiratory failure with hypoxia (HCC) [J96.01]  Acute hypoxic respiratory failure (HCC) [J96.01]  Requesting Physician: Christopher Potter MD  Primary Care physician: Bahman Scales MD    Reason for Consultation/Chief Complaint: Elevated troponin with history of CAD and PCI to LAD    History of Present Illness:     Osvaldo Noble is a 48 y.o. 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  Patient with reports of wheezing, shortness of breath productive cough for the past couple weeks.  Patient with frequent falls.  Patient with chronic episodes where his upper extremities lower extremities shake and become weak.  Follows with neurology who notes that patient has been having ongoing asterixis felt to be secondary to metabolic myoclonus, MRI brain with evidence of cerebellar atrophy consistent with alcohol abuse.  It is also noted patient has a history of folate deficiency.  They note patient has a high risk for memory difficulties secondary to history of alcohol dependence.  There is also been concern for possible seizure disorder in which antiseizure medications been started per outpatient neurology note.    Patient states he follows with cardiology in [place].  He states a few months ago he had an episode of chest pain where he took 3 nitro.  However this was several

## 2024-06-13 NOTE — CARE COORDINATION
Case Management Assessment  Initial Evaluation    Date/Time of Evaluation: 6/13/2024 2:57 PM  Assessment Completed by: Ann Dolan RN    If patient is discharged prior to next notation, then this note serves as note for discharge by case management.    Patient Name: Osvaldo Noble                   YOB: 1975  Diagnosis: Shortness of breath [R06.02]  Hypoxemia [R09.02]  COPD exacerbation (HCC) [J44.1]  Acute respiratory failure with hypoxia (HCC) [J96.01]  Acute hypoxic respiratory failure (HCC) [J96.01]                   Date / Time: 6/12/2024  6:25 PM    Patient Admission Status: Inpatient   Readmission Risk (Low < 19, Mod (19-27), High > 27): Readmission Risk Score: 14.9    Current PCP: Bahman Scales MD  PCP verified by CM? Yes (Loyda)    Chart Reviewed: Yes      History Provided by: Spouse  Patient Orientation: Alert and Oriented    Patient Cognition: Alert    Hospitalization in the last 30 days (Readmission):  No    If yes, Readmission Assessment in CM Navigator will be completed.    Advance Directives:      Code Status: Full Code   Patient's Primary Decision Maker is: Legal Next of Kin      Discharge Planning:    Patient lives with: Spouse/Significant Other Type of Home: Apartment  Primary Care Giver: Self  Patient Support Systems include: Spouse/Significant Other   Current Financial resources: Other (Comment) ([employer])  Current community resources: None  Current services prior to admission: None            Current DME:              Type of Home Care services:  None    ADLS  Prior functional level: Assistance with the following:, Housework, Shopping  Current functional level: Assistance with the following:, Housework, Shopping    PT AM-PAC:   /24  OT AM-PAC:   /24    Family can provide assistance at DC: Yes  Would you like Case Management to discuss the discharge plan with any other family members/significant others, and if so, who? Yes (Wife)  Plans to Return to Present

## 2024-06-13 NOTE — H&P
[facility] History [facility] Physical      Patient Name: Osvaldo Noble    : 1975    PCP: Bahman Scales MD    Date of Service:  Patient seen and examined on 24     Chief Complaint: Shortness of breath    History Of Present Illness:    Osvaldo Noble is a 48 y.o. male with a PMH of mild COPD, hypertension, hyperlipidemia, CAD status post PCI, PTSD, GERD, cervical spine surgery who presented to ED with complaint of shortness of breath.    Patient endorses that over the last few days he has had complaints of wheezing shortness of breath and productive cough of whitish sputum.  States that he was brought to the ED due to the fact that he has had recent falls with his leg giving out.  Had cervical spine surgery in  and has had complications subsequently with weakness in his upper and lower extremities.  Has seen neurology and his spine surgeon for follow-up.  No loss of consciousness with fall or hitting his head.  On presenting in the ED patient was found to be hypoxic in the 80s subsequently placed on NC oxygen.  Blood pressure 108/79, pulse of 73, temperature 98.2, respiration 20, saturating 93%.  BMP stable, proBNP 167, troponin 75/70, WBC 15.4 hemoglobin 11.1.  Influenza A/B/COVID-negative.  Urinalysis not indicative of UTI.  VBG shows pH of 7427, pCO2 of 41.7, pO2 of 97.2, bicarb of 26.9.  Chest x-ray shows mild vascular congestion and bibasilar subsegmental atelectasis, CT chest shows no evidence of PE, bilateral lateral groundglass opacities with septal thickening concerns for pulmonary edema, debris/secretions in the right main bronchus extending into the bronchus intermedius and segmental branches of the right lower lobe.    In the ED patient received methylprednisone 125 mg, DuoNeb treatments, doxycycline 100 mg IV.  Aspirin 325 mg.    Past Medical History:    Patient has a past medical history of Anxiety, CAD (coronary artery disease), COPD (chronic obstructive pulmonary

## 2024-06-13 NOTE — CONSULTS
[facility]                                 Pulmonary/Critical care  Consult /Progress Note :                                                                  CC :worsening SOB   Patient is being seen at the request of Amita Ellis  for a consultation for COPD ,abnormal CT     HISTORY OF PRESENT ILLNESS:   Patient is a 48-year-old male with history of COPD seen by Dr. Abreu  In 2024 with restrictive lung disease with FEV1 71 and TLC 70 and DLCO 60    He is also known history of sleep apnea and on CPAP    He had a CAT scan in 2018 that did not show any evidence of      Patient stated he had neck surgery 1 year ago since then he is having a tremor bilateral extremity along with weakness and worsening shortness of breath reason for him to come to the hospital and stated   He has about 60 pack.  Smoking history  He stated that for the last 2 days he has been having worsening shortness of breath and wheezing with cough and congestion with no hemoptysis and his sputum mostly productive of white sputum    If follow-up with neurology and his spine surgeon    Had recent fall with his leg giving out    Patient sat was in the 80s when he was in the ER    He stated he did some crack and other drugs in the past    PAST MEDICAL HISTORY:  Past Medical History:   Diagnosis Date    Anxiety     CAD (coronary artery disease)     COPD (chronic obstructive pulmonary disease) ([facility])     Depression     Gout     both knees and feet    Hyperlipidemia     Insomnia     NSTEMI (non-ST elevated myocardial infarction) ([facility]) 12/30/2018    Panic disorder     Positive urine drug screen 12/2018    opiate, cocaine, benzos    PTSD (post-traumatic stress disorder)     Reflux     Scoliosis     Traumatic rupture of spleen     Wears glasses      PAST SURGICAL HISTORY:  Past Surgical History:   Procedure Laterality Date    CERVICAL SPINE SURGERY  07/10/2023    cervical

## 2024-06-13 NOTE — ED PROVIDER NOTES
[facility] ED  EMERGENCY DEPARTMENT ENCOUNTER        Patient Name: Osvaldo Noble  MRN: [id number]  Birthdate 1975  Date of evaluation: 6/12/2024  Provider: Soheila Falk MD  PCP: Bahman Scales MD  Note Started: 11:01 PM EDT 6/12/24    I independently examined and evaluated Osvaldo Noble. I personally saw the patient and performed a substantive portion of the visit including all aspects of the medical decision making.  I made/approved the management plan and take responsibility for the patient management.  I am the primary physician of record.    CHIEF COMPLAINT  Shortness of breath       HISTORY OF PRESENT ILLNESS  History from : Patient    Limitations to history : None    In brief, Osvaldo Noble is a 48 y.o. male  has a past medical history of Anxiety, CAD (coronary artery disease), COPD (chronic obstructive pulmonary disease) ([facility]), Depression, Gout, Hyperlipidemia, Insomnia, NSTEMI (non-ST elevated myocardial infarction) ([facility]) (12/30/2018), Panic disorder, Positive urine drug screen (12/2018), PTSD (post-traumatic stress disorder), Reflux, Scoliosis, Traumatic rupture of spleen, and Wears glasses., who presents to the ED complaining of shortness of breath.  Patient reports a couple weeks of wheezing, shortness of breath, and productive cough.  He denies chest pain, vomiting, diarrhea, constipation, fever.  Patient does not wear oxygen at home but is hypoxic on arrival.  Patient also reports chronic unchanged episodes where when he stands he has shaking in his upper and lower extremities and becomes weak.  No loss of consciousness.  He did not fall or hit his head.  This occurs frequently and is unchanged from baseline and patient follows up with neurology.       REVIEW OF SYSTEMS  All systems reviewed, pertinent positives per HPI otherwise noted to be negative.    Focused exam revealed   PHYSICAL EXAM  ED Triage Vitals   BP Temp Temp Source Pulse Respirations SpO2 Height

## 2024-06-14 PROBLEM — F03.B0 MODERATE DEMENTIA (HCC): Status: ACTIVE | Noted: 2024-06-14

## 2024-06-14 PROBLEM — J44.1 COPD EXACERBATION (HCC): Status: ACTIVE | Noted: 2024-06-14

## 2024-06-14 LAB
ALBUMIN SERPL-MCNC: 3.2 G/DL (ref 3.4–5)
ALBUMIN/GLOB SERPL: 0.9 {RATIO} (ref 1.1–2.2)
ALP SERPL-CCNC: 93 U/L (ref 40–129)
ALT SERPL-CCNC: 15 U/L (ref 10–40)
ANION GAP SERPL CALCULATED.3IONS-SCNC: 9 MMOL/L (ref 3–16)
AST SERPL-CCNC: 14 U/L (ref 15–37)
BASOPHILS # BLD: 0 K/UL (ref 0–0.2)
BASOPHILS NFR BLD: 0.1 %
BILIRUB SERPL-MCNC: <0.2 MG/DL (ref 0–1)
BUN SERPL-MCNC: 13 MG/DL (ref 7–20)
CALCIUM SERPL-MCNC: 8.8 MG/DL (ref 8.3–10.6)
CHLORIDE SERPL-SCNC: 104 MMOL/L (ref 99–110)
CO2 SERPL-SCNC: 29 MMOL/L (ref 21–32)
CREAT SERPL-MCNC: 1 MG/DL (ref 0.9–1.3)
DEPRECATED RDW RBC AUTO: 17.2 % (ref 12.4–15.4)
EOSINOPHIL # BLD: 0 K/UL (ref 0–0.6)
EOSINOPHIL NFR BLD: 0 %
GFR SERPLBLD CREATININE-BSD FMLA CKD-EPI: >90 ML/MIN/{1.73_M2}
GLUCOSE SERPL-MCNC: 154 MG/DL (ref 70–99)
HCT VFR BLD AUTO: 32.9 % (ref 40.5–52.5)
HGB BLD-MCNC: 10.3 G/DL (ref 13.5–17.5)
LEGIONELLA AG UR QL: NORMAL
LYMPHOCYTES # BLD: 1.3 K/UL (ref 1–5.1)
LYMPHOCYTES NFR BLD: 5.9 %
MCH RBC QN AUTO: 28.4 PG (ref 26–34)
MCHC RBC AUTO-ENTMCNC: 31.3 G/DL (ref 31–36)
MCV RBC AUTO: 90.6 FL (ref 80–100)
MONOCYTES # BLD: 1.3 K/UL (ref 0–1.3)
MONOCYTES NFR BLD: 5.9 %
NEUTROPHILS # BLD: 19.1 K/UL (ref 1.7–7.7)
NEUTROPHILS NFR BLD: 88.1 %
PLATELET # BLD AUTO: 301 K/UL (ref 135–450)
PMV BLD AUTO: 8.7 FL (ref 5–10.5)
POTASSIUM SERPL-SCNC: 4.9 MMOL/L (ref 3.5–5.1)
PROT SERPL-MCNC: 6.7 G/DL (ref 6.4–8.2)
RBC # BLD AUTO: 3.63 M/UL (ref 4.2–5.9)
S PNEUM AG UR QL: NORMAL
SODIUM SERPL-SCNC: 142 MMOL/L (ref 136–145)
WBC # BLD AUTO: 21.6 K/UL (ref 4–11)

## 2024-06-14 PROCEDURE — 6370000000 HC RX 637 (ALT 250 FOR IP): Performed by: INTERNAL MEDICINE

## 2024-06-14 PROCEDURE — 1200000000 HC SEMI PRIVATE

## 2024-06-14 PROCEDURE — 2580000003 HC RX 258: Performed by: PHYSICIAN ASSISTANT

## 2024-06-14 PROCEDURE — 2580000003 HC RX 258: Performed by: INTERNAL MEDICINE

## 2024-06-14 PROCEDURE — 80053 COMPREHEN METABOLIC PANEL: CPT

## 2024-06-14 PROCEDURE — 92610 EVALUATE SWALLOWING FUNCTION: CPT

## 2024-06-14 PROCEDURE — 2700000000 HC OXYGEN THERAPY PER DAY

## 2024-06-14 PROCEDURE — 2500000003 HC RX 250 WO HCPCS: Performed by: PHYSICIAN ASSISTANT

## 2024-06-14 PROCEDURE — 97530 THERAPEUTIC ACTIVITIES: CPT

## 2024-06-14 PROCEDURE — 99232 SBSQ HOSP IP/OBS MODERATE 35: CPT | Performed by: INTERNAL MEDICINE

## 2024-06-14 PROCEDURE — 97162 PT EVAL MOD COMPLEX 30 MIN: CPT

## 2024-06-14 PROCEDURE — 36415 COLL VENOUS BLD VENIPUNCTURE: CPT

## 2024-06-14 PROCEDURE — 6370000000 HC RX 637 (ALT 250 FOR IP): Performed by: PSYCHIATRY & NEUROLOGY

## 2024-06-14 PROCEDURE — 94761 N-INVAS EAR/PLS OXIMETRY MLT: CPT

## 2024-06-14 PROCEDURE — 6360000002 HC RX W HCPCS: Performed by: INTERNAL MEDICINE

## 2024-06-14 PROCEDURE — 94669 MECHANICAL CHEST WALL OSCILL: CPT

## 2024-06-14 PROCEDURE — 85025 COMPLETE CBC W/AUTO DIFF WBC: CPT

## 2024-06-14 PROCEDURE — 97166 OT EVAL MOD COMPLEX 45 MIN: CPT

## 2024-06-14 PROCEDURE — 6370000000 HC RX 637 (ALT 250 FOR IP): Performed by: NURSE PRACTITIONER

## 2024-06-14 PROCEDURE — 99233 SBSQ HOSP IP/OBS HIGH 50: CPT | Performed by: INTERNAL MEDICINE

## 2024-06-14 PROCEDURE — 99223 1ST HOSP IP/OBS HIGH 75: CPT | Performed by: PSYCHIATRY & NEUROLOGY

## 2024-06-14 PROCEDURE — 94640 AIRWAY INHALATION TREATMENT: CPT

## 2024-06-14 RX ORDER — BACLOFEN 10 MG/1
10 TABLET ORAL 2 TIMES DAILY
Status: DISCONTINUED | OUTPATIENT
Start: 2024-06-14 | End: 2024-06-16 | Stop reason: HOSPADM

## 2024-06-14 RX ORDER — IPRATROPIUM BROMIDE AND ALBUTEROL SULFATE 2.5; .5 MG/3ML; MG/3ML
1 SOLUTION RESPIRATORY (INHALATION) 3 TIMES DAILY
Status: DISCONTINUED | OUTPATIENT
Start: 2024-06-14 | End: 2024-06-16

## 2024-06-14 RX ADMIN — PREDNISONE 40 MG: 20 TABLET ORAL at 08:36

## 2024-06-14 RX ADMIN — IPRATROPIUM BROMIDE AND ALBUTEROL SULFATE 1 DOSE: 2.5; .5 SOLUTION RESPIRATORY (INHALATION) at 14:10

## 2024-06-14 RX ADMIN — VENLAFAXINE HYDROCHLORIDE 150 MG: 75 CAPSULE, EXTENDED RELEASE ORAL at 08:35

## 2024-06-14 RX ADMIN — NYSTATIN 500000 UNITS: 100000 SUSPENSION ORAL at 21:32

## 2024-06-14 RX ADMIN — OXYCODONE HYDROCHLORIDE AND ACETAMINOPHEN 1 TABLET: 7.5; 325 TABLET ORAL at 07:26

## 2024-06-14 RX ADMIN — ENOXAPARIN SODIUM 30 MG: 100 INJECTION SUBCUTANEOUS at 21:32

## 2024-06-14 RX ADMIN — BACLOFEN 10 MG: 10 TABLET ORAL at 08:35

## 2024-06-14 RX ADMIN — DOXYCYCLINE 100 MG: 100 INJECTION, POWDER, LYOPHILIZED, FOR SOLUTION INTRAVENOUS at 23:44

## 2024-06-14 RX ADMIN — Medication 100 MG: at 08:35

## 2024-06-14 RX ADMIN — OXYCODONE HYDROCHLORIDE AND ACETAMINOPHEN 1 TABLET: 7.5; 325 TABLET ORAL at 13:27

## 2024-06-14 RX ADMIN — FOLIC ACID 1 MG: 1 TABLET ORAL at 08:35

## 2024-06-14 RX ADMIN — FERROUS SULFATE TAB 325 MG (65 MG ELEMENTAL FE) 325 MG: 325 (65 FE) TAB at 08:35

## 2024-06-14 RX ADMIN — NYSTATIN 500000 UNITS: 100000 SUSPENSION ORAL at 16:49

## 2024-06-14 RX ADMIN — MEMANTINE HYDROCHLORIDE 10 MG: 5 TABLET ORAL at 08:35

## 2024-06-14 RX ADMIN — DOXYCYCLINE 100 MG: 100 INJECTION, POWDER, LYOPHILIZED, FOR SOLUTION INTRAVENOUS at 11:43

## 2024-06-14 RX ADMIN — TAMSULOSIN HYDROCHLORIDE 0.4 MG: 0.4 CAPSULE ORAL at 08:36

## 2024-06-14 RX ADMIN — SODIUM CHLORIDE, PRESERVATIVE FREE 10 ML: 5 INJECTION INTRAVENOUS at 08:43

## 2024-06-14 RX ADMIN — ENOXAPARIN SODIUM 30 MG: 100 INJECTION SUBCUTANEOUS at 08:35

## 2024-06-14 RX ADMIN — BACLOFEN 10 MG: 10 TABLET ORAL at 21:32

## 2024-06-14 RX ADMIN — GABAPENTIN 400 MG: 400 CAPSULE ORAL at 08:36

## 2024-06-14 RX ADMIN — ROPINIROLE HYDROCHLORIDE 1 MG: 1 TABLET, FILM COATED ORAL at 21:32

## 2024-06-14 RX ADMIN — TRAZODONE HYDROCHLORIDE 200 MG: 100 TABLET ORAL at 21:32

## 2024-06-14 RX ADMIN — AMLODIPINE BESYLATE 2.5 MG: 2.5 TABLET ORAL at 08:36

## 2024-06-14 RX ADMIN — ALPRAZOLAM 0.5 MG: 0.5 TABLET ORAL at 21:32

## 2024-06-14 RX ADMIN — PRAVASTATIN SODIUM 40 MG: 40 TABLET ORAL at 21:32

## 2024-06-14 RX ADMIN — IPRATROPIUM BROMIDE AND ALBUTEROL SULFATE 1 DOSE: 2.5; .5 SOLUTION RESPIRATORY (INHALATION) at 07:27

## 2024-06-14 RX ADMIN — ALPRAZOLAM 0.5 MG: 0.5 TABLET ORAL at 11:41

## 2024-06-14 RX ADMIN — ASPIRIN 81 MG: 81 TABLET, CHEWABLE ORAL at 08:35

## 2024-06-14 RX ADMIN — Medication 1000 MCG: at 08:36

## 2024-06-14 RX ADMIN — PANTOPRAZOLE SODIUM 40 MG: 40 TABLET, DELAYED RELEASE ORAL at 05:59

## 2024-06-14 RX ADMIN — TAMSULOSIN HYDROCHLORIDE 0.4 MG: 0.4 CAPSULE ORAL at 21:32

## 2024-06-14 RX ADMIN — MEMANTINE HYDROCHLORIDE 10 MG: 5 TABLET ORAL at 21:32

## 2024-06-14 RX ADMIN — NYSTATIN 500000 UNITS: 100000 SUSPENSION ORAL at 11:41

## 2024-06-14 RX ADMIN — SODIUM CHLORIDE, PRESERVATIVE FREE 10 ML: 5 INJECTION INTRAVENOUS at 21:34

## 2024-06-14 RX ADMIN — GABAPENTIN 400 MG: 400 CAPSULE ORAL at 21:32

## 2024-06-14 RX ADMIN — EZETIMIBE 10 MG: 10 TABLET ORAL at 08:35

## 2024-06-14 RX ADMIN — ALLOPURINOL 600 MG: 300 TABLET ORAL at 08:36

## 2024-06-14 RX ADMIN — OXYCODONE HYDROCHLORIDE AND ACETAMINOPHEN 1 TABLET: 7.5; 325 TABLET ORAL at 00:12

## 2024-06-14 RX ADMIN — GABAPENTIN 400 MG: 400 CAPSULE ORAL at 13:27

## 2024-06-14 RX ADMIN — IPRATROPIUM BROMIDE AND ALBUTEROL SULFATE 1 DOSE: 2.5; .5 SOLUTION RESPIRATORY (INHALATION) at 19:39

## 2024-06-14 RX ADMIN — CLOPIDOGREL BISULFATE 75 MG: 75 TABLET ORAL at 08:35

## 2024-06-14 RX ADMIN — DOXYCYCLINE 100 MG: 100 INJECTION, POWDER, LYOPHILIZED, FOR SOLUTION INTRAVENOUS at 00:10

## 2024-06-14 RX ADMIN — CEFTRIAXONE SODIUM 1000 MG: 1 INJECTION, POWDER, FOR SOLUTION INTRAMUSCULAR; INTRAVENOUS at 08:53

## 2024-06-14 RX ADMIN — NYSTATIN 500000 UNITS: 100000 SUSPENSION ORAL at 08:35

## 2024-06-14 ASSESSMENT — PAIN - FUNCTIONAL ASSESSMENT
PAIN_FUNCTIONAL_ASSESSMENT: ACTIVITIES ARE NOT PREVENTED
PAIN_FUNCTIONAL_ASSESSMENT: PREVENTS OR INTERFERES SOME ACTIVE ACTIVITIES AND ADLS
PAIN_FUNCTIONAL_ASSESSMENT: ACTIVITIES ARE NOT PREVENTED

## 2024-06-14 ASSESSMENT — PAIN DESCRIPTION - LOCATION
LOCATION: BACK
LOCATION: NECK;BACK
LOCATION: NECK
LOCATION: BACK;HEAD

## 2024-06-14 ASSESSMENT — PAIN SCALES - GENERAL
PAINLEVEL_OUTOF10: 8
PAINLEVEL_OUTOF10: 8
PAINLEVEL_OUTOF10: 0
PAINLEVEL_OUTOF10: 7
PAINLEVEL_OUTOF10: 3
PAINLEVEL_OUTOF10: 5
PAINLEVEL_OUTOF10: 5
PAINLEVEL_OUTOF10: 8

## 2024-06-14 ASSESSMENT — PAIN DESCRIPTION - ORIENTATION
ORIENTATION: RIGHT;LEFT;MID
ORIENTATION: POSTERIOR
ORIENTATION: LOWER
ORIENTATION: OTHER (COMMENT)

## 2024-06-14 ASSESSMENT — PAIN DESCRIPTION - DESCRIPTORS
DESCRIPTORS: ACHING
DESCRIPTORS: ACHING;SORE

## 2024-06-14 ASSESSMENT — PAIN SCALES - WONG BAKER
WONGBAKER_NUMERICALRESPONSE: HURTS A LITTLE BIT

## 2024-06-14 ASSESSMENT — PAIN DESCRIPTION - PAIN TYPE: TYPE: CHRONIC PAIN

## 2024-06-14 NOTE — CONSULTS
Neurology consultation note    Patient name: Osvaldo Noble      Chief Complaint:  Worsening of generalized weakness.    History of present illness:  This is a 48 years old right-handed male.  The patient is known to neurology clinic due to Wernicke encephalopathy and peripheral neuropathy is due to toxic/metabolic etiology.  The patient is noted for history of alcohol dependence.  The patient was also found to have multiple nutritional deficiency in the past including thiamine/folate and B12.  The patient will receive cervical decompression about a year ago.  After the surgery, the patient reports worsening of weakness all over.  The patient was brought to the hospital yesterday after the patient complains significant shortness of breath with wheezing.  The patient also reported generalized shaking and worsening of arm weakness upon admission.  Neurology is being consulted for further evaluation regarding generalized weakness.    Past medical history:    Past Medical History:   Diagnosis Date    Anxiety     CAD (coronary artery disease)     COPD (chronic obstructive pulmonary disease) ([facility])     Depression     Gout     both knees and feet    Hyperlipidemia     Insomnia     NSTEMI (non-ST elevated myocardial infarction) ([facility]) 12/30/2018    Panic disorder     Positive urine drug screen 12/2018    opiate, cocaine, benzos    PTSD (post-traumatic stress disorder)     Reflux     Scoliosis     Traumatic rupture of spleen     Wears glasses        Past surgical history:    Past Surgical History:   Procedure Laterality Date    CERVICAL SPINE SURGERY  07/10/2023    cervical spine fusion surgery,Pt has 2 plates & 4 screws.    CORONARY ANGIOPLASTY WITH STENT PLACEMENT      2019    HERNIA REPAIR      KNEE SURGERY Left     ACL    PTCA  12/30/2018    ROTATOR CUFF REPAIR Right     UPPER GASTROINTESTINAL ENDOSCOPY N/A 12/14/2022    EGD BIOPSY performed by Melvin Mcclellan MD at [facility] ENDOSCOPY

## 2024-06-15 ENCOUNTER — APPOINTMENT (OUTPATIENT)
Dept: MRI IMAGING | Age: 49
DRG: 190 | End: 2024-06-15
Payer: COMMERCIAL

## 2024-06-15 PROBLEM — M54.2 NECK PAIN: Status: ACTIVE | Noted: 2024-06-15

## 2024-06-15 PROCEDURE — 2580000003 HC RX 258: Performed by: PHYSICIAN ASSISTANT

## 2024-06-15 PROCEDURE — 6370000000 HC RX 637 (ALT 250 FOR IP): Performed by: NURSE PRACTITIONER

## 2024-06-15 PROCEDURE — 94761 N-INVAS EAR/PLS OXIMETRY MLT: CPT

## 2024-06-15 PROCEDURE — 6370000000 HC RX 637 (ALT 250 FOR IP): Performed by: INTERNAL MEDICINE

## 2024-06-15 PROCEDURE — 72141 MRI NECK SPINE W/O DYE: CPT

## 2024-06-15 PROCEDURE — 2580000003 HC RX 258: Performed by: INTERNAL MEDICINE

## 2024-06-15 PROCEDURE — 2500000003 HC RX 250 WO HCPCS: Performed by: PHYSICIAN ASSISTANT

## 2024-06-15 PROCEDURE — 1200000000 HC SEMI PRIVATE

## 2024-06-15 PROCEDURE — 94669 MECHANICAL CHEST WALL OSCILL: CPT

## 2024-06-15 PROCEDURE — 99232 SBSQ HOSP IP/OBS MODERATE 35: CPT | Performed by: INTERNAL MEDICINE

## 2024-06-15 PROCEDURE — 6370000000 HC RX 637 (ALT 250 FOR IP): Performed by: PSYCHIATRY & NEUROLOGY

## 2024-06-15 PROCEDURE — 6360000002 HC RX W HCPCS: Performed by: INTERNAL MEDICINE

## 2024-06-15 PROCEDURE — 94640 AIRWAY INHALATION TREATMENT: CPT

## 2024-06-15 RX ADMIN — FERROUS SULFATE TAB 325 MG (65 MG ELEMENTAL FE) 325 MG: 325 (65 FE) TAB at 09:13

## 2024-06-15 RX ADMIN — ALPRAZOLAM 0.5 MG: 0.5 TABLET ORAL at 16:41

## 2024-06-15 RX ADMIN — EZETIMIBE 10 MG: 10 TABLET ORAL at 09:12

## 2024-06-15 RX ADMIN — OXYCODONE HYDROCHLORIDE AND ACETAMINOPHEN 1 TABLET: 7.5; 325 TABLET ORAL at 04:05

## 2024-06-15 RX ADMIN — Medication 100 MG: at 09:12

## 2024-06-15 RX ADMIN — ROPINIROLE HYDROCHLORIDE 1 MG: 1 TABLET, FILM COATED ORAL at 19:56

## 2024-06-15 RX ADMIN — NYSTATIN 500000 UNITS: 100000 SUSPENSION ORAL at 12:55

## 2024-06-15 RX ADMIN — AMLODIPINE BESYLATE 2.5 MG: 2.5 TABLET ORAL at 09:12

## 2024-06-15 RX ADMIN — NYSTATIN 500000 UNITS: 100000 SUSPENSION ORAL at 19:55

## 2024-06-15 RX ADMIN — ALPRAZOLAM 0.5 MG: 0.5 TABLET ORAL at 09:25

## 2024-06-15 RX ADMIN — VENLAFAXINE HYDROCHLORIDE 150 MG: 75 CAPSULE, EXTENDED RELEASE ORAL at 09:12

## 2024-06-15 RX ADMIN — SODIUM CHLORIDE, PRESERVATIVE FREE 5 ML: 5 INJECTION INTRAVENOUS at 09:15

## 2024-06-15 RX ADMIN — NYSTATIN 500000 UNITS: 100000 SUSPENSION ORAL at 09:14

## 2024-06-15 RX ADMIN — GABAPENTIN 400 MG: 400 CAPSULE ORAL at 19:55

## 2024-06-15 RX ADMIN — NYSTATIN 500000 UNITS: 100000 SUSPENSION ORAL at 16:41

## 2024-06-15 RX ADMIN — DOXYCYCLINE 100 MG: 100 INJECTION, POWDER, LYOPHILIZED, FOR SOLUTION INTRAVENOUS at 11:19

## 2024-06-15 RX ADMIN — PRAVASTATIN SODIUM 40 MG: 40 TABLET ORAL at 19:55

## 2024-06-15 RX ADMIN — BACLOFEN 10 MG: 10 TABLET ORAL at 09:12

## 2024-06-15 RX ADMIN — MEMANTINE HYDROCHLORIDE 10 MG: 5 TABLET ORAL at 09:12

## 2024-06-15 RX ADMIN — ENOXAPARIN SODIUM 30 MG: 100 INJECTION SUBCUTANEOUS at 09:15

## 2024-06-15 RX ADMIN — OXYCODONE HYDROCHLORIDE AND ACETAMINOPHEN 1 TABLET: 7.5; 325 TABLET ORAL at 12:55

## 2024-06-15 RX ADMIN — OXYCODONE HYDROCHLORIDE AND ACETAMINOPHEN 1 TABLET: 7.5; 325 TABLET ORAL at 19:09

## 2024-06-15 RX ADMIN — ASPIRIN 81 MG: 81 TABLET, CHEWABLE ORAL at 09:12

## 2024-06-15 RX ADMIN — CEFTRIAXONE SODIUM 1000 MG: 1 INJECTION, POWDER, FOR SOLUTION INTRAMUSCULAR; INTRAVENOUS at 09:23

## 2024-06-15 RX ADMIN — SODIUM CHLORIDE, PRESERVATIVE FREE 10 ML: 5 INJECTION INTRAVENOUS at 19:57

## 2024-06-15 RX ADMIN — ENOXAPARIN SODIUM 30 MG: 100 INJECTION SUBCUTANEOUS at 19:55

## 2024-06-15 RX ADMIN — IPRATROPIUM BROMIDE AND ALBUTEROL SULFATE 1 DOSE: 2.5; .5 SOLUTION RESPIRATORY (INHALATION) at 07:25

## 2024-06-15 RX ADMIN — MEMANTINE HYDROCHLORIDE 10 MG: 5 TABLET ORAL at 19:55

## 2024-06-15 RX ADMIN — PREDNISONE 40 MG: 20 TABLET ORAL at 09:12

## 2024-06-15 RX ADMIN — CLOPIDOGREL BISULFATE 75 MG: 75 TABLET ORAL at 09:13

## 2024-06-15 RX ADMIN — BACLOFEN 10 MG: 10 TABLET ORAL at 19:56

## 2024-06-15 RX ADMIN — GABAPENTIN 400 MG: 400 CAPSULE ORAL at 09:12

## 2024-06-15 RX ADMIN — ALLOPURINOL 600 MG: 300 TABLET ORAL at 09:12

## 2024-06-15 RX ADMIN — DOXYCYCLINE 100 MG: 100 INJECTION, POWDER, LYOPHILIZED, FOR SOLUTION INTRAVENOUS at 23:39

## 2024-06-15 RX ADMIN — IPRATROPIUM BROMIDE AND ALBUTEROL SULFATE 1 DOSE: 2.5; .5 SOLUTION RESPIRATORY (INHALATION) at 19:39

## 2024-06-15 RX ADMIN — Medication 1000 MCG: at 09:12

## 2024-06-15 RX ADMIN — TAMSULOSIN HYDROCHLORIDE 0.4 MG: 0.4 CAPSULE ORAL at 19:56

## 2024-06-15 RX ADMIN — FOLIC ACID 1 MG: 1 TABLET ORAL at 09:12

## 2024-06-15 RX ADMIN — TAMSULOSIN HYDROCHLORIDE 0.4 MG: 0.4 CAPSULE ORAL at 09:12

## 2024-06-15 RX ADMIN — TRAZODONE HYDROCHLORIDE 200 MG: 100 TABLET ORAL at 19:56

## 2024-06-15 RX ADMIN — GABAPENTIN 400 MG: 400 CAPSULE ORAL at 13:48

## 2024-06-15 ASSESSMENT — PAIN SCALES - GENERAL
PAINLEVEL_OUTOF10: 10
PAINLEVEL_OUTOF10: 9
PAINLEVEL_OUTOF10: 7
PAINLEVEL_OUTOF10: 9
PAINLEVEL_OUTOF10: 10

## 2024-06-15 ASSESSMENT — PAIN DESCRIPTION - ORIENTATION
ORIENTATION: LEFT
ORIENTATION: OTHER (COMMENT)
ORIENTATION: LEFT;RIGHT

## 2024-06-15 ASSESSMENT — PAIN DESCRIPTION - LOCATION
LOCATION: HEAD;FOOT
LOCATION: HEAD;NECK;SHOULDER
LOCATION: NECK;SHOULDER;ARM
LOCATION: HEAD;SHOULDER;NECK

## 2024-06-15 ASSESSMENT — PAIN DESCRIPTION - DESCRIPTORS
DESCRIPTORS: POUNDING
DESCRIPTORS: SHARP;THROBBING
DESCRIPTORS: SHARP
DESCRIPTORS: BURNING;TINGLING

## 2024-06-15 ASSESSMENT — PAIN - FUNCTIONAL ASSESSMENT
PAIN_FUNCTIONAL_ASSESSMENT: PREVENTS OR INTERFERES SOME ACTIVE ACTIVITIES AND ADLS
PAIN_FUNCTIONAL_ASSESSMENT: ACTIVITIES ARE NOT PREVENTED

## 2024-06-15 ASSESSMENT — PAIN SCALES - WONG BAKER
WONGBAKER_NUMERICALRESPONSE: NO HURT
WONGBAKER_NUMERICALRESPONSE: NO HURT

## 2024-06-16 VITALS
DIASTOLIC BLOOD PRESSURE: 71 MMHG | WEIGHT: 225 LBS | SYSTOLIC BLOOD PRESSURE: 117 MMHG | OXYGEN SATURATION: 93 % | BODY MASS INDEX: 32.21 KG/M2 | RESPIRATION RATE: 18 BRPM | TEMPERATURE: 98.7 F | HEIGHT: 70 IN | HEART RATE: 82 BPM

## 2024-06-16 PROBLEM — R09.02 HYPOXEMIA: Status: ACTIVE | Noted: 2024-06-16

## 2024-06-16 PROCEDURE — 94669 MECHANICAL CHEST WALL OSCILL: CPT

## 2024-06-16 PROCEDURE — 2500000003 HC RX 250 WO HCPCS: Performed by: PHYSICIAN ASSISTANT

## 2024-06-16 PROCEDURE — 6370000000 HC RX 637 (ALT 250 FOR IP): Performed by: INTERNAL MEDICINE

## 2024-06-16 PROCEDURE — 2580000003 HC RX 258: Performed by: PHYSICIAN ASSISTANT

## 2024-06-16 PROCEDURE — 94640 AIRWAY INHALATION TREATMENT: CPT

## 2024-06-16 PROCEDURE — 99238 HOSP IP/OBS DSCHRG MGMT 30/<: CPT | Performed by: INTERNAL MEDICINE

## 2024-06-16 PROCEDURE — 6370000000 HC RX 637 (ALT 250 FOR IP): Performed by: NURSE PRACTITIONER

## 2024-06-16 PROCEDURE — 2580000003 HC RX 258: Performed by: INTERNAL MEDICINE

## 2024-06-16 PROCEDURE — 99232 SBSQ HOSP IP/OBS MODERATE 35: CPT | Performed by: INTERNAL MEDICINE

## 2024-06-16 PROCEDURE — 6370000000 HC RX 637 (ALT 250 FOR IP): Performed by: PSYCHIATRY & NEUROLOGY

## 2024-06-16 PROCEDURE — 6360000002 HC RX W HCPCS: Performed by: INTERNAL MEDICINE

## 2024-06-16 RX ORDER — PREDNISONE 10 MG/1
TABLET ORAL
Qty: 30 TABLET | Refills: 0 | Status: SHIPPED | OUTPATIENT
Start: 2024-06-16

## 2024-06-16 RX ORDER — IPRATROPIUM BROMIDE AND ALBUTEROL SULFATE 2.5; .5 MG/3ML; MG/3ML
1 SOLUTION RESPIRATORY (INHALATION)
Status: DISCONTINUED | OUTPATIENT
Start: 2024-06-16 | End: 2024-06-16 | Stop reason: HOSPADM

## 2024-06-16 RX ORDER — LEVOFLOXACIN 500 MG/1
500 TABLET, FILM COATED ORAL DAILY
Qty: 5 TABLET | Refills: 0 | Status: SHIPPED | OUTPATIENT
Start: 2024-06-16 | End: 2024-06-21

## 2024-06-16 RX ADMIN — TAMSULOSIN HYDROCHLORIDE 0.4 MG: 0.4 CAPSULE ORAL at 09:33

## 2024-06-16 RX ADMIN — Medication 1000 MCG: at 09:32

## 2024-06-16 RX ADMIN — CEFTRIAXONE SODIUM 1000 MG: 1 INJECTION, POWDER, FOR SOLUTION INTRAMUSCULAR; INTRAVENOUS at 09:42

## 2024-06-16 RX ADMIN — ALPRAZOLAM 0.5 MG: 0.5 TABLET ORAL at 11:32

## 2024-06-16 RX ADMIN — PREDNISONE 40 MG: 20 TABLET ORAL at 09:31

## 2024-06-16 RX ADMIN — EZETIMIBE 10 MG: 10 TABLET ORAL at 09:32

## 2024-06-16 RX ADMIN — GABAPENTIN 400 MG: 400 CAPSULE ORAL at 09:30

## 2024-06-16 RX ADMIN — VENLAFAXINE HYDROCHLORIDE 150 MG: 75 CAPSULE, EXTENDED RELEASE ORAL at 09:33

## 2024-06-16 RX ADMIN — Medication 100 MG: at 09:33

## 2024-06-16 RX ADMIN — MEMANTINE HYDROCHLORIDE 10 MG: 5 TABLET ORAL at 09:32

## 2024-06-16 RX ADMIN — ASPIRIN 81 MG: 81 TABLET, CHEWABLE ORAL at 09:31

## 2024-06-16 RX ADMIN — CLOPIDOGREL BISULFATE 75 MG: 75 TABLET ORAL at 09:32

## 2024-06-16 RX ADMIN — NYSTATIN 500000 UNITS: 100000 SUSPENSION ORAL at 09:31

## 2024-06-16 RX ADMIN — FOLIC ACID 1 MG: 1 TABLET ORAL at 09:31

## 2024-06-16 RX ADMIN — AMLODIPINE BESYLATE 2.5 MG: 2.5 TABLET ORAL at 09:33

## 2024-06-16 RX ADMIN — PANTOPRAZOLE SODIUM 40 MG: 40 TABLET, DELAYED RELEASE ORAL at 06:27

## 2024-06-16 RX ADMIN — FERROUS SULFATE TAB 325 MG (65 MG ELEMENTAL FE) 325 MG: 325 (65 FE) TAB at 09:31

## 2024-06-16 RX ADMIN — IPRATROPIUM BROMIDE AND ALBUTEROL SULFATE 1 DOSE: 2.5; .5 SOLUTION RESPIRATORY (INHALATION) at 07:14

## 2024-06-16 RX ADMIN — BACLOFEN 10 MG: 10 TABLET ORAL at 09:31

## 2024-06-16 RX ADMIN — OXYCODONE HYDROCHLORIDE AND ACETAMINOPHEN 1 TABLET: 7.5; 325 TABLET ORAL at 06:27

## 2024-06-16 RX ADMIN — ALPRAZOLAM 0.5 MG: 0.5 TABLET ORAL at 02:23

## 2024-06-16 RX ADMIN — ENOXAPARIN SODIUM 30 MG: 100 INJECTION SUBCUTANEOUS at 09:33

## 2024-06-16 RX ADMIN — DOXYCYCLINE 100 MG: 100 INJECTION, POWDER, LYOPHILIZED, FOR SOLUTION INTRAVENOUS at 11:31

## 2024-06-16 RX ADMIN — ALLOPURINOL 600 MG: 300 TABLET ORAL at 09:33

## 2024-06-16 ASSESSMENT — PAIN DESCRIPTION - DESCRIPTORS: DESCRIPTORS: BURNING

## 2024-06-16 ASSESSMENT — PAIN DESCRIPTION - ORIENTATION: ORIENTATION: LEFT;RIGHT

## 2024-06-16 ASSESSMENT — PAIN DESCRIPTION - LOCATION: LOCATION: LEG

## 2024-06-16 ASSESSMENT — PAIN SCALES - GENERAL
PAINLEVEL_OUTOF10: 6
PAINLEVEL_OUTOF10: 8

## 2024-06-16 NOTE — FLOWSHEET NOTE
06/16/24 0915   Vital Signs   Temp 98.7 °F (37.1 °C)   Temp Source Oral   Pulse 82   Heart Rate Source Monitor   Respirations 18   /71   MAP (Calculated) 86   Patient Position Semi fowlers   Pain Assessment   Pain Assessment None - Denies Pain   Oxygen Therapy   SpO2 93 %   O2 Device None (Room air)       Shift assessment complete. See flow sheet. Scheduled meds given. See MAR.  Patients head-toe complete, VS are logged, and active bowel sound noted in all four quadrants.    Pt sitting up in bed respirations easy and unlabored. No s/s of distress. Alert and oriented denies pain or SOB pt stable    No further needs  noted at this time. Call light and bedside table are within reach. The bed is locked and is in the lowest position.        Jay Mora RN

## 2024-06-16 NOTE — CARE COORDINATION
CM met with patient to discuss discharge plan. The patient advises that his spouse has his home care set up.      M LVM for spouse.    Spouse is at bedside. CM met with patient and spouse to discuss discharge plan.  Spouse advised that [facility] is going to assist with home care for the patient.   Hospice is helping the family because the patient's insurance does not cover home care.

## 2024-06-16 NOTE — PROGRESS NOTES
[facility]                                 Pulmonary/Critical care  Consult /Progress Note :                                                                  CC :worsening SOB       Subjective  Doing much better  On 1-2 L  No chest pain   No hemoptysis  Feel better  Complaining of neck pain    PHYSICAL EXAM:  Vitals:    06/15/24 0900   BP: 103/71   Pulse: 65   Resp: 16   Temp: 97.6 °F (36.4 °C)   SpO2: 94%     Gen: No distress.   Eyes: PERRL. No sclera icterus. No conjunctival injection.   ENT: No discharge. Pharynx clear.   Neck: Trachea midline. No obvious mass.    Resp:  bilateral rhonchi/wheeze  CV: Regular rate. Regular rhythm. No murmur or rub. No edema. Peripheral pulses are 2+.  Capillary refill is less than 3 seconds.  GI: Non-tender. Non-distended. No hernia.   Skin: Warm and dry. No nodule on exposed extremities.   Lymph: No cervical LAD. No supraclavicular LAD.   M/S: No cyanosis. No joint deformity. No clubbing.   Neuro: Awake. Alert. Moves all four extremities.   Psych: Oriented x 3. No anxiety.     LABS:  CBC:   Recent Labs     06/12/24 1928 06/14/24  0536   WBC 15.4* 21.6*   HGB 11.1* 10.3*   HCT 35.1* 32.9*   MCV 90.3 90.6    301       BMP:   Recent Labs     06/12/24 1928 06/14/24  0536    142   K 3.7 4.9    104   CO2 31 29   BUN 8 13   CREATININE 1.0 1.0       LIVER PROFILE:   Recent Labs     06/12/24 1928 06/14/24  0536   AST 27 14*   ALT 19 15   BILITOT <0.2 <0.2   ALKPHOS 106 93       PT/INR: No results for input(s): \"PROTIME\", \"INR\" in the last 72 hours.  APTT: No results for input(s): \"APTT\" in the last 72 hours.  UA:  Recent Labs     06/12/24  2222   COLORU Yellow   PHUR 6.0  6.0   CLARITYU Clear   LEUKOCYTESUR Negative   UROBILINOGEN 0.2   BILIRUBINUR Negative   BLOODU Negative   GLUCOSEU 500*         Microbiology:  Ordered    Imaging:  Chest imaging was reviewed by me and showed 
                                                  [facility]                                 Pulmonary/Critical care  Consult /Progress Note :                                                                  CC :worsening SOB       Subjective  Doing much better  On RAL  No chest pain   No hemoptysis  Feel better  Complaining of neck pain    PHYSICAL EXAM:  Vitals:    06/16/24 0915   BP: 117/71   Pulse: 82   Resp: 18   Temp: 98.7 °F (37.1 °C)   SpO2: 93%     Gen: No distress.   Eyes: PERRL. No sclera icterus. No conjunctival injection.   ENT: No discharge. Pharynx clear.   Neck: Trachea midline. No obvious mass.    Resp:  bilateral rhonchi/wheeze  CV: Regular rate. Regular rhythm. No murmur or rub. No edema. Peripheral pulses are 2+.  Capillary refill is less than 3 seconds.  GI: Non-tender. Non-distended. No hernia.   Skin: Warm and dry. No nodule on exposed extremities.   Lymph: No cervical LAD. No supraclavicular LAD.   M/S: No cyanosis. No joint deformity. No clubbing.   Neuro: Awake. Alert. Moves all four extremities.   Psych: Oriented x 3. No anxiety.     LABS:  CBC:   Recent Labs     06/14/24  0536   WBC 21.6*   HGB 10.3*   HCT 32.9*   MCV 90.6          BMP:   Recent Labs     06/14/24  0536      K 4.9      CO2 29   BUN 13   CREATININE 1.0       LIVER PROFILE:   Recent Labs     06/14/24  0536   AST 14*   ALT 15   BILITOT <0.2   ALKPHOS 93       PT/INR: No results for input(s): \"PROTIME\", \"INR\" in the last 72 hours.  APTT: No results for input(s): \"APTT\" in the last 72 hours.  UA:  No results for input(s): \"NITRITE\", \"COLORU\", \"PHUR\", \"LABCAST\", \"WBCUA\", \"RBCUA\", \"MUCUS\", \"TRICHOMONAS\", \"YEAST\", \"BACTERIA\", \"CLARITYU\", \"SPECGRAV\", \"LEUKOCYTESUR\", \"UROBILINOGEN\", \"BILIRUBINUR\", \"BLOODU\", \"GLUCOSEU\", \"AMORPHOUS\" in the last 72 hours.    Invalid input(s): \"KETONESU\"      Microbiology:  Ordered    Imaging:  Chest imaging was reviewed by me and showed 
   06/13/24 1553   Encounter Summary   Encounter Overview/Reason Initial Encounter;Advance Care Planning   Service Provided For Patient   Referral/Consult From Multi-disciplinary team   Support System Spouse   Last Encounter  06/13/24  ( visited with patient (Osvaldo); provided pastoral support; assisted with completion of ACP documents)   Begin Time 1505   End Time  1550   Total Time Calculated 45 min   Advance Care Planning   Type Completed AD/ACP document(s);ACP conversation       
/62   Pulse 74   Temp 97.6 °F (36.4 °C) (Oral)   Resp 16   Ht 1.778 m (5' 10\")   Wt 102.4 kg (225 lb 11.2 oz)   SpO2 93%   BMI 32.38 kg/m²     Assessment complete. Meds passed. Pt denies needs at this time. Wife at bedside, expresses difficulty with urinating. Weaned from 4 to 2l of oxygen. Sleepy today. Can stand at side of bed with assistance. Prn pain medication given this morning        Bedside Mobility Assessment Tool (BMAT):     Assessment Level 1- Sit and Shake    1. From a semi-reclined position, ask patient to sit up and rotate to a seated position at the side of the bed. Can use the bedrail.    2. Ask patient to reach out and grab your hand and shake making sure patient reaches across his/her midline.   Pass- Patient is able to come to a seated position, maintain core strength. Maintains seated balance while reaching across midline. Move on to Assessment Level 2.     Assessment Level 2- Stretch and Point   1. With patient in seated position at the side of the bed, have patient place both feet on the floor (or stool) with knees no higher than hips.    2. Ask patient to stretch one leg and straighten the knee, then bend the ankle/flex and point the toes. If appropriate, repeat with the other leg.   Pass- Patient is able to demonstrate appropriate quad strength on intended weight bearing limb(s). Move onto Assessment Level 3.     Assessment Level 3- Stand   1. Ask patient to elevate off the bed or chair (seated to standing) using an assistive device (cane, bedrail).    2. Patient should be able to raise buttocks off be and hold for a count of five. May repeat once.   Pass- Patient maintains standing stability for at least 5 seconds, proceed to assessment level 4.    Assessment Level 4- Walk   1. Ask patient to march in place at bedside.    2. Then ask patient to advance step and return each foot. Some medical conditions may render a patient from stepping backwards, use your best clinical 
4 Eyes Skin Assessment     NAME:  Osvaldo Noble  YOB: 1975  MEDICAL RECORD NUMBER:  [id number]    The patient is being assessed for  Admission    I agree that at least one RN has performed a thorough Head to Toe Skin Assessment on the patient. ALL assessment sites listed below have been assessed.      Areas assessed by both nurses:    Head, Face, Ears, Shoulders, Back, Chest, Arms, Elbows, Hands, Sacrum. Buttock, Coccyx, Ischium, and Legs. Feet and Heels:  scattered bruising/abrasions        Does the Patient have a Wound? No noted wound(s)       Rolo Prevention initiated by RN: No  Wound Care Orders initiated by RN: No    Pressure Injury (Stage 3,4, Unstageable, DTI, NWPT, and Complex wounds) if present, place Wound referral order by RN under : No    New Ostomies, if present place, Ostomy referral order under : No     Nurse 1 eSignature: Electronically signed by Roberta Jim RN on 6/13/24 at 3:33 AM EDT    **SHARE this note so that the co-signing nurse can place an eSignature**    Nurse 2 eSignature: Electronically signed by Heaven Wilde RN on 6/13/24 at 5:22 AM EDT   
Admit: 2024    Name:  Osvaldo Noble  Room:  0208/0208-01  MRN:    [id number]    Daily Progress Note for 2024   Admitted with acute hypoxic respiratory failure    Interval History:     Scheduled Meds:   ipratropium 0.5 mg-albuterol 2.5 mg  1 Dose Inhalation TID    sodium chloride flush  5-40 mL IntraVENous 2 times per day    allopurinol  600 mg Oral Daily    amLODIPine  2.5 mg Oral Daily    aspirin  81 mg Oral Daily    baclofen  10 mg Oral TID    vitamin B-12  1,000 mcg Oral Daily    thiamine  100 mg Oral Daily    venlafaxine  150 mg Oral Daily    traZODone  200 mg Oral Nightly    tamsulosin  0.4 mg Oral BID    rOPINIRole  1 mg Oral Nightly    pravastatin  40 mg Oral Nightly    pantoprazole  40 mg Oral QAM AC    memantine  10 mg Oral BID    folic acid  1 mg Oral Daily    ferrous sulfate  325 mg Oral Daily with breakfast    gabapentin  400 mg Oral TID    ezetimibe  10 mg Oral Daily    clopidogrel  75 mg Oral Daily    predniSONE  40 mg Oral Daily    cefTRIAXone (ROCEPHIN) IV  1,000 mg IntraVENous Q24H    enoxaparin  30 mg SubCUTAneous BID    nicotine  1 patch TransDERmal Daily    nystatin  5 mL Oral 4x Daily    doxycycline (VIBRAMYCIN) IV  100 mg IntraVENous Q12H       Continuous Infusions:   sodium chloride         PRN Meds:  sodium chloride flush, sodium chloride, potassium chloride **OR** potassium alternative oral replacement **OR** potassium chloride, magnesium sulfate, ondansetron **OR** ondansetron, polyethylene glycol, acetaminophen **OR** acetaminophen, ALPRAZolam, oxyCODONE-acetaminophen, indomethacin, ipratropium 0.5 mg-albuterol 2.5 mg                  Objective:     Temp  Av.6 °F (36.4 °C)  Min: 97.6 °F (36.4 °C)  Max: 97.7 °F (36.5 °C)  Pulse  Av.4  Min: 74  Max: 87  BP  Min: 99/77  Max: 115/66  SpO2  Av.5 %  Min: 91 %  Max: 96 %  Patient Vitals for the past 4 hrs:   Resp SpO2   24 0756 18 --   24 0728 -- 94 %         Intake/Output Summary (Last 24 hours) at 
Admitted to [facility].  A/o; O2 on at 4 L pnc; states he has dementia and his wife helps him with everything.  Hand tremors noted; patient states this is his norm.  See admission information.  Call light in reach.    
Back from MRI; tele back on; call light in reach.    
Brief Progress Note    Date: 06/13/24    Subjective: Patient was evaluated by nocturnist early this AM. Current plan of care below. I have reviewed vitals, labs and orders.    Objective:  Vitals:    06/13/24 0930 06/13/24 1015 06/13/24 1142 06/13/24 1317   BP: 101/62   103/77   Pulse: 74      Resp: 18 16     Temp: 97.6 °F (36.4 °C)      TempSrc: Oral      SpO2: 95%  93%    Weight:    102.1 kg (225 lb)   Height:    1.778 m (5' 10\")       Labs:  CBC:   Recent Labs     06/11/24  1610 06/12/24 1928   WBC 15.4* 15.4*   HGB 11.3* 11.1*   HCT 35.2* 35.1*   MCV 88.9 90.3    308     BMP:   Recent Labs     06/11/24  1610 06/12/24 1928    137   K 4.2 3.7    100   CO2 30 31   BUN 10 8   CREATININE 1.0 1.0     LIVER PROFILE:   Recent Labs     06/11/24  1610 06/12/24 1928   AST 41* 27   ALT 24 19   BILIDIR <0.2  --    BILITOT <0.2 <0.2   ALKPHOS 117 106     PT/INR: No results for input(s): \"PROTIME\", \"INR\" in the last 72 hours.  APTT: No results for input(s): \"APTT\" in the last 72 hours.  UA:  Recent Labs     06/12/24  2222   COLORU Yellow   PHUR 6.0   CLARITYU Clear   LEUKOCYTESUR Negative   UROBILINOGEN 0.2   BILIRUBINUR Negative   BLOODU Negative   GLUCOSEU 500*      Echo   Left Ventricle: Normal left ventricular systolic function with a visually estimated EF of 55 - 60%. Left ventricle size is normal. Normal wall thickness. Normal wall motion. Normal diastolic function.    Right Ventricle: Right ventricle is mildly dilated. Normal systolic function.    Aortic Valve: No regurgitation. No stenosis.    Mitral Valve: Mild regurgitation.    Tricuspid Valve: Mild regurgitation. Normal RVSP. Est RA pressure is 8 mmHg. The estimated RVSP is 27 mmHg.    Right Atrium: Right atrium is mildly dilated.    Aorta: Normal sized ascending aorta. Mildly dilated aortic root. Ao root diameter is 3.8 cm.    Pericardium: No pericardial effusion.    IVC/SVC: IVC diameter is less than or equal to 21 mm and decreases less than 
Consult called to Dr. Gu regarding consult @ 0813 6/13/2024 ayde segovia  
IV removed and discharge instructions completed. All questions were answered to patients satisfaction.   Request for transport placed, all personal belongs packed. No further needs noted.    
Patient requested pain medication be brought in with his ABX. Patient noted asleep when RN brought medication to bedside. Patient  did not awake when his name was called. Does not appear to be in any distress. Resp. Rate even and unlabored. Call light and bedside table in reach. Bed alarm activated, bed in lowest position with with non skid foot sock in place.   
Percocet po for chronic neck,head and shoulder pain.  
Pt anxious and requesting his PRN Xanax at this time. PRN given pt denies further needs.  
Pt asleep snoring respirations.  
Pt sitting in bed this AM during shift assessment. He is alert and oriented x3-4, accepting of care. Pt has anxiety and tremors as well as chronic back pain. PRN meds provided. O2 able to be removed, currently on room air. Continuous O2 monitoring remains in place. Pt working with therapy today. Tolerating food and fluids. Vitals stable. Call light within reach. No new concerns at this time.     Bedside Mobility Assessment Tool (BMAT):     Assessment Level 1- Sit and Shake    1. From a semi-reclined position, ask patient to sit up and rotate to a seated position at the side of the bed. Can use the bedrail.    2. Ask patient to reach out and grab your hand and shake making sure patient reaches across his/her midline.   Pass- Patient is able to come to a seated position, maintain core strength. Maintains seated balance while reaching across midline. Move on to Assessment Level 2.     Assessment Level 2- Stretch and Point   1. With patient in seated position at the side of the bed, have patient place both feet on the floor (or stool) with knees no higher than hips.    2. Ask patient to stretch one leg and straighten the knee, then bend the ankle/flex and point the toes. If appropriate, repeat with the other leg.   Pass- Patient is able to demonstrate appropriate quad strength on intended weight bearing limb(s). Move onto Assessment Level 3.     Assessment Level 3- Stand   1. Ask patient to elevate off the bed or chair (seated to standing) using an assistive device (cane, bedrail).    2. Patient should be able to raise buttocks off be and hold for a count of five. May repeat once.   Pass- Patient maintains standing stability for at least 5 seconds, proceed to assessment level 4.    Assessment Level 4- Walk   1. Ask patient to march in place at bedside.    2. Then ask patient to advance step and return each foot. Some medical conditions may render a patient from stepping backwards, use your best clinical judgement. 
Pt taken to MRI.  
RT Inhaler-Nebulizer Bronchodilator Protocol Note    There is a bronchodilator order in the chart from a provider indicating to follow the RT Bronchodilator Protocol and there is an “Initiate RT Inhaler-Nebulizer Bronchodilator Protocol” order as well (see protocol at bottom of note).    CXR Findings:  No results found.    The findings from the last RT Protocol Assessment were as follows:   History Pulmonary Disease: Chronic pulmonary disease  Respiratory Pattern: Dyspnea on exertion or RR 21-25 bpm  Breath Sounds: Intermittent or unilateral wheezes  Cough: Strong, spontaneous, non-productive  Indication for Bronchodilator Therapy: Wheezing associated with pulm disorder  Bronchodilator Assessment Score: 8    Aerosolized bronchodilator medication orders have been revised according to the RT Inhaler-Nebulizer Bronchodilator Protocol below.    Respiratory Therapist to perform RT Therapy Protocol Assessment initially then follow the protocol.  Repeat RT Therapy Protocol Assessment PRN for score 0-3 or on second treatment, BID, and PRN for scores above 3.    No Indications - adjust the frequency to every 6 hours PRN wheezing or bronchospasm, if no treatments needed after 48 hours then discontinue using Per Protocol order mode.     If indication present, adjust the RT bronchodilator orders based on the Bronchodilator Assessment Score as indicated below.  Use Inhaler orders unless patient has one or more of the following: on home nebulizer, not able to hold breath for 10 seconds, is not alert and oriented, cannot activate and use MDI correctly, or respiratory rate 25 breaths per minute or more, then use the equivalent nebulizer order(s) with same Frequency and PRN reasons based on the score.  If a patient is on this medication at home then do not decrease Frequency below that used at home.    0-3 - enter or revise RT bronchodilator order(s) to equivalent RT Bronchodilator order with Frequency of every 4 hours PRN for 
RT Inhaler-Nebulizer Bronchodilator Protocol Note    There is a bronchodilator order in the chart from a provider indicating to follow the RT Bronchodilator Protocol and there is an “Initiate RT Inhaler-Nebulizer Bronchodilator Protocol” order as well (see protocol at bottom of note).    CXR Findings:  No results found.    The findings from the last RT Protocol Assessment were as follows:   History Pulmonary Disease: Chronic pulmonary disease  Respiratory Pattern: Dyspnea on exertion or RR 21-25 bpm  Breath Sounds: Slightly diminished and/or crackles  Cough: Strong, spontaneous, non-productive  Indication for Bronchodilator Therapy: Decreased or absent breath sounds  Bronchodilator Assessment Score: 6    Aerosolized bronchodilator medication orders have been revised according to the RT Inhaler-Nebulizer Bronchodilator Protocol below.    Respiratory Therapist to perform RT Therapy Protocol Assessment initially then follow the protocol.  Repeat RT Therapy Protocol Assessment PRN for score 0-3 or on second treatment, BID, and PRN for scores above 3.    No Indications - adjust the frequency to every 6 hours PRN wheezing or bronchospasm, if no treatments needed after 48 hours then discontinue using Per Protocol order mode.     If indication present, adjust the RT bronchodilator orders based on the Bronchodilator Assessment Score as indicated below.  Use Inhaler orders unless patient has one or more of the following: on home nebulizer, not able to hold breath for 10 seconds, is not alert and oriented, cannot activate and use MDI correctly, or respiratory rate 25 breaths per minute or more, then use the equivalent nebulizer order(s) with same Frequency and PRN reasons based on the score.  If a patient is on this medication at home then do not decrease Frequency below that used at home.    0-3 - enter or revise RT bronchodilator order(s) to equivalent RT Bronchodilator order with Frequency of every 4 hours PRN for wheezing 
RT Inhaler-Nebulizer Bronchodilator Protocol Note    There is a bronchodilator order in the chart from a provider indicating to follow the RT Bronchodilator Protocol and there is an “Initiate RT Inhaler-Nebulizer Bronchodilator Protocol” order as well (see protocol at bottom of note).    CXR Findings:  No results found.    The findings from the last RT Protocol Assessment were as follows:   History Pulmonary Disease: Chronic pulmonary disease  Respiratory Pattern: Dyspnea on exertion or RR 21-25 bpm  Breath Sounds: Slightly diminished and/or crackles  Cough: Strong, spontaneous, non-productive  Indication for Bronchodilator Therapy: Decreased or absent breath sounds  Bronchodilator Assessment Score: 6    Aerosolized bronchodilator medication orders have been revised according to the RT Inhaler-Nebulizer Bronchodilator Protocol below.    Respiratory Therapist to perform RT Therapy Protocol Assessment initially then follow the protocol.  Repeat RT Therapy Protocol Assessment PRN for score 0-3 or on second treatment, BID, and PRN for scores above 3.    No Indications - adjust the frequency to every 6 hours PRN wheezing or bronchospasm, if no treatments needed after 48 hours then discontinue using Per Protocol order mode.     If indication present, adjust the RT bronchodilator orders based on the Bronchodilator Assessment Score as indicated below.  Use Inhaler orders unless patient has one or more of the following: on home nebulizer, not able to hold breath for 10 seconds, is not alert and oriented, cannot activate and use MDI correctly, or respiratory rate 25 breaths per minute or more, then use the equivalent nebulizer order(s) with same Frequency and PRN reasons based on the score.  If a patient is on this medication at home then do not decrease Frequency below that used at home.    0-3 - enter or revise RT bronchodilator order(s) to equivalent RT Bronchodilator order with Frequency of every 4 hours PRN for wheezing 
RT Inhaler-Nebulizer Bronchodilator Protocol Note    There is a bronchodilator order in the chart from a provider indicating to follow the RT Bronchodilator Protocol and there is an “Initiate RT Inhaler-Nebulizer Bronchodilator Protocol” order as well (see protocol at bottom of note).    CXR Findings:  No results found.    The findings from the last RT Protocol Assessment were as follows:   History Pulmonary Disease: Chronic pulmonary disease  Respiratory Pattern: Regular pattern and RR 12-20 bpm  Breath Sounds: Slightly diminished and/or crackles  Cough: Strong, spontaneous, non-productive  Indication for Bronchodilator Therapy: Decreased or absent breath sounds  Bronchodilator Assessment Score: 4    Aerosolized bronchodilator medication orders have been revised according to the RT Inhaler-Nebulizer Bronchodilator Protocol below.    Respiratory Therapist to perform RT Therapy Protocol Assessment initially then follow the protocol.  Repeat RT Therapy Protocol Assessment PRN for score 0-3 or on second treatment, BID, and PRN for scores above 3.    No Indications - adjust the frequency to every 6 hours PRN wheezing or bronchospasm, if no treatments needed after 48 hours then discontinue using Per Protocol order mode.     If indication present, adjust the RT bronchodilator orders based on the Bronchodilator Assessment Score as indicated below.  Use Inhaler orders unless patient has one or more of the following: on home nebulizer, not able to hold breath for 10 seconds, is not alert and oriented, cannot activate and use MDI correctly, or respiratory rate 25 breaths per minute or more, then use the equivalent nebulizer order(s) with same Frequency and PRN reasons based on the score.  If a patient is on this medication at home then do not decrease Frequency below that used at home.    0-3 - enter or revise RT bronchodilator order(s) to equivalent RT Bronchodilator order with Frequency of every 4 hours PRN for wheezing or 
RT Inhaler-Nebulizer Bronchodilator Protocol Note    There is a bronchodilator order in the chart from a provider indicating to follow the RT Bronchodilator Protocol and there is an “Initiate RT Inhaler-Nebulizer Bronchodilator Protocol” order as well (see protocol at bottom of note).    CXR Findings:  XR CHEST PORTABLE    Result Date: 6/12/2024  Shallow inflation with mild vascular congestion and basilar subsegmental atelectasis.       The findings from the last RT Protocol Assessment were as follows:   History Pulmonary Disease: (P) Chronic pulmonary disease  Respiratory Pattern: (P) Dyspnea on exertion or RR 21-25 bpm  Breath Sounds: (P) Inspiratory and expiratory or bilateral wheezing and/or rhonchi  Cough: (P) Strong, spontaneous, non-productive  Indication for Bronchodilator Therapy: (P) Wheezing associated with pulm disorder  Bronchodilator Assessment Score: (P) 10    Aerosolized bronchodilator medication orders have been revised according to the RT Inhaler-Nebulizer Bronchodilator Protocol below.    Respiratory Therapist to perform RT Therapy Protocol Assessment initially then follow the protocol.  Repeat RT Therapy Protocol Assessment PRN for score 0-3 or on second treatment, BID, and PRN for scores above 3.    No Indications - adjust the frequency to every 6 hours PRN wheezing or bronchospasm, if no treatments needed after 48 hours then discontinue using Per Protocol order mode.     If indication present, adjust the RT bronchodilator orders based on the Bronchodilator Assessment Score as indicated below.  Use Inhaler orders unless patient has one or more of the following: on home nebulizer, not able to hold breath for 10 seconds, is not alert and oriented, cannot activate and use MDI correctly, or respiratory rate 25 breaths per minute or more, then use the equivalent nebulizer order(s) with same Frequency and PRN reasons based on the score.  If a patient is on this medication at home then do not decrease 
RT Inhaler-Nebulizer Bronchodilator Protocol Note    There is a bronchodilator order in the chart from a provider indicating to follow the RT Bronchodilator Protocol and there is an “Initiate RT Inhaler-Nebulizer Bronchodilator Protocol” order as well (see protocol at bottom of note).    CXR Findings:  XR CHEST PORTABLE    Result Date: 6/12/2024  Shallow inflation with mild vascular congestion and basilar subsegmental atelectasis.       The findings from the last RT Protocol Assessment were as follows:   History Pulmonary Disease: (P) Chronic pulmonary disease  Respiratory Pattern: (P) Dyspnea on exertion or RR 21-25 bpm  Breath Sounds: (P) Intermittent or unilateral wheezes  Cough: (P) Strong, spontaneous, non-productive  Indication for Bronchodilator Therapy: (P) Decreased or absent breath sounds  Bronchodilator Assessment Score: (P) 8    Aerosolized bronchodilator medication orders have been revised according to the RT Inhaler-Nebulizer Bronchodilator Protocol below.    Respiratory Therapist to perform RT Therapy Protocol Assessment initially then follow the protocol.  Repeat RT Therapy Protocol Assessment PRN for score 0-3 or on second treatment, BID, and PRN for scores above 3.    No Indications - adjust the frequency to every 6 hours PRN wheezing or bronchospasm, if no treatments needed after 48 hours then discontinue using Per Protocol order mode.     If indication present, adjust the RT bronchodilator orders based on the Bronchodilator Assessment Score as indicated below.  Use Inhaler orders unless patient has one or more of the following: on home nebulizer, not able to hold breath for 10 seconds, is not alert and oriented, cannot activate and use MDI correctly, or respiratory rate 25 breaths per minute or more, then use the equivalent nebulizer order(s) with same Frequency and PRN reasons based on the score.  If a patient is on this medication at home then do not decrease Frequency below that used at 
RT Inhaler-Nebulizer Bronchodilator Protocol Note    There is a bronchodilator order in the chart from a provider indicating to follow the RT Bronchodilator Protocol and there is an “Initiate RT Inhaler-Nebulizer Bronchodilator Protocol” order as well (see protocol at bottom of note).    CXR Findings:  XR CHEST PORTABLE    Result Date: 6/12/2024  Shallow inflation with mild vascular congestion and basilar subsegmental atelectasis.       The findings from the last RT Protocol Assessment were as follows:   History Pulmonary Disease: Chronic pulmonary disease  Respiratory Pattern: Dyspnea on exertion or RR 21-25 bpm  Breath Sounds: Inspiratory and expiratory or bilateral wheezing and/or rhonchi  Cough: Strong, spontaneous, non-productive  Indication for Bronchodilator Therapy: Wheezing associated with pulm disorder  Bronchodilator Assessment Score: 10    Aerosolized bronchodilator medication orders have been revised according to the RT Inhaler-Nebulizer Bronchodilator Protocol below.    Respiratory Therapist to perform RT Therapy Protocol Assessment initially then follow the protocol.  Repeat RT Therapy Protocol Assessment PRN for score 0-3 or on second treatment, BID, and PRN for scores above 3.    No Indications - adjust the frequency to every 6 hours PRN wheezing or bronchospasm, if no treatments needed after 48 hours then discontinue using Per Protocol order mode.     If indication present, adjust the RT bronchodilator orders based on the Bronchodilator Assessment Score as indicated below.  Use Inhaler orders unless patient has one or more of the following: on home nebulizer, not able to hold breath for 10 seconds, is not alert and oriented, cannot activate and use MDI correctly, or respiratory rate 25 breaths per minute or more, then use the equivalent nebulizer order(s) with same Frequency and PRN reasons based on the score.  If a patient is on this medication at home then do not decrease Frequency below that 
RT Inhaler-Nebulizer Bronchodilator Protocol Note    There is a bronchodilator order in the chart from a provider indicating to follow the RT Bronchodilator Protocol and there is an “Initiate RT Inhaler-Nebulizer Bronchodilator Protocol” order as well (see protocol at bottom of note).    CXR Findings:  XR CHEST PORTABLE    Result Date: 6/12/2024  Shallow inflation with mild vascular congestion and basilar subsegmental atelectasis.       The findings from the last RT Protocol Assessment were as follows:   History Pulmonary Disease: Chronic pulmonary disease  Respiratory Pattern: Dyspnea on exertion or RR 21-25 bpm  Breath Sounds: Slightly diminished and/or crackles  Cough: Strong, spontaneous, non-productive  Indication for Bronchodilator Therapy: Decreased or absent breath sounds  Bronchodilator Assessment Score: 6    Aerosolized bronchodilator medication orders have been revised according to the RT Inhaler-Nebulizer Bronchodilator Protocol below.    Respiratory Therapist to perform RT Therapy Protocol Assessment initially then follow the protocol.  Repeat RT Therapy Protocol Assessment PRN for score 0-3 or on second treatment, BID, and PRN for scores above 3.    No Indications - adjust the frequency to every 6 hours PRN wheezing or bronchospasm, if no treatments needed after 48 hours then discontinue using Per Protocol order mode.     If indication present, adjust the RT bronchodilator orders based on the Bronchodilator Assessment Score as indicated below.  Use Inhaler orders unless patient has one or more of the following: on home nebulizer, not able to hold breath for 10 seconds, is not alert and oriented, cannot activate and use MDI correctly, or respiratory rate 25 breaths per minute or more, then use the equivalent nebulizer order(s) with same Frequency and PRN reasons based on the score.  If a patient is on this medication at home then do not decrease Frequency below that used at home.    0-3 - enter or 
Shift report given to RAISA Gonzales  
Shift report given to RAISA York  
Shift report received from RAISA Young.  Patient still at MRI; spouse at bedside.  
Walk test    94% walking room air   
        Microbiology:  Ordered    Imaging:  Chest imaging was reviewed by me and showed bilateral groundglass    ASSESSMENT:   Acute hypoxic respiratory failure  Mucous of blogs versus endobronchial lesion  Chronic bronchitis with COPD exacerbation  Groundglass opacity bilaterally diffuse with septal thickening, questionable ILD versus other smoking-related ILD  Possible fluid overload  Possible pneumonia  Check urine drug      PLAN:  As mentioned possible smoking related ILD ,work up as OP   At this time we will proceed with a  *- O2 goal 92-95   *-IV steroids ,wean To PO when feel better ,and taper over 7 days   *-IV abx Rocephin and Azithromycin Day 2  *-check viral panel /pneumonia panel  pending   *- Sputum culture ,sending sample as she has it in cup   *_If in distress ,will use CPAP/BiPAP  *- pro calcitonin 0.10  *-BD  ICS if wheezing   Incentive spirmetery and flutter valve   Avoid fluid over load   Will need repeat CT in 4 to 6 weeks, up finding versus then we will consider bronchoscopy with transbronchial  Check o2 at ambulation before discharge and if sat less than 88 ,call for O2 at home   May needed NIMV down the road , will check on his CPAP status  DVT prophylaxis as per    
(acute kidney injury) (HCC) 05/27/2021    Marijuana use 05/27/2021    SOB (shortness of breath) 06/12/2020    General weakness 06/12/2020    Mixed hyperlipidemia 06/11/2019    Anxiety and depression 12/31/2018    Alcohol use vs abuse 12/31/2018    Cocaine use 12/31/2018    Other forms of angina pectoris (HCC) 12/30/2018    NSTEMI s/p PCI and stenting of two LAD lesions     Current smoker     Coronary artery disease        #Acute hypoxic respiratory failure  -Patient presents with shortness of breath cough and wheezing  -Saturation in the 80s, placed on 2 L NC  -Endorses a history of mild COPD on inhaler, not on chronic home oxygen.  -Chest imaging concerning for pulmonary edema--echo normal EF and no DD  Now on room air     # Concerns for COPD exacerbation  Abnormal chest CT   -Patient endorses he has a history of mild COPD  -Currently on inhalers Breztri  -Plan-IV antibiotics doxycycline,rocephin DuoNeb treatment, prednisone, resume home Breztri  - pulmonary consulted     #Concerns for CHF  -Presents with dyspnea  -Elevated proBNP, troponins elevated--75--70  - likely demand ischemia given hypoxia sats 80% on arrival  - no acute changes on EKG  -Plan-2D echo- normal EF not DD  No further cardiology testing recommended by cardiology     #History of cervical spine surgery  -Patient complains of upper and lower extremity weakness  -History of associated falls  -Follows up with his spinal surgeon and neurology outpatient  -Neurology consulted  - MRI is pending     #History of CAD status post PCI  #Elevated Troponin   - elevated troponin 75--70  - no acute EKG changes   - on plavix, statin  - cardiology consulted      #Hypertension/hyperlipidemia  -Resume home medication     #GERD  On PPI     #PTSD  #Moderate dementia  Wernicke-Korsakoff syndrome  - Resume home medication     #JOHNATHAN  Endorses sleep study outpatient diagnosed with sleep apnea  Not on CPAP at night  Outpatient follow-up with sleep        DVT Prophylaxis: 
unsafe/ not appropriate to complete stairs at this time     Therapeutic Exercises Initiated  deferred secondary to treatment focus on functional mobility  Supine:  N/A    Seated:  N/A    Standing:  N/A    Activity Tolerance   During therapy session noted pt with no adverse symptoms to activity      BP (mmHg) HR (bpm) SpO2 (%) on 2L Comments   Seated at /82 74 99     Seated at EOB     94-96 RA     Standing 123/80 80 92-93 RA      After 100ft ambulation at EOB    96 93 RA     After 5x sit-to-stand   96 92 RA     End of session - supine     93-94 RA        Positioning Needs   Pt in bed, alarm set, positioned in proper neutral alignment and pressure relief provided.     Other Activities  None.    Patient/Family Education   Pt educated on role of inpatient PT, POC, importance of continued activity, DC recommendations, safety awareness, and transfer techniques.      Assessment  Pt seen today for physical therapy Evaluation & Treatment. Pt demonstrated decreased Activity tolerance, Balance, ROM, Safety, and Strength as well as decreased independence with Ambulation, Bed Mobility , and Transfers. Pt with very unsteady gait despite not requiring any assistance during ambulation. Consider further assessment with balance outcome in the future (ie: Tinetti BRISSA). Skilled physical therapy in the acute setting is necessary to address the above impairments in order for the patient to progress toward full PLOF.     Recommending SNF upon discharge as patient functioning well below baseline, demonstrates good rehab potential and unable to return home due to burden of care beyond caregiver ability and home environment not conducive to patient recovery.    Goals :   To be met in 3 visits:  1). Independent with LE Ex x 10 reps  2). Sit to/from stand: SBA  3). Bed to chair: SBA  4). CHF goal: N/A    To be met in 6 visits:  1).  Supine to/from sit: Supervision  2).  Sit to/from stand: Supervision  3).  Bed to chair: 
standing balance before initiating functional mobility. Pt was offered at various times to participate in ADL activities with pt declining. Given his history of extensive falls as well as aforementioned deficits, pt continues to demonstrate high risk for falls without continued OT services. Pt would benefit from continued OT services to address standing balance and safety awareness to reduce caregiver burden and increase pt's safety and independence with ADL participation.     Recommending SNF upon discharge as patient functioning well below baseline, demonstrates good rehab potential and unable to return home due to limited or no family support, inability to negotiate stairs to enter home/bedroom/bathroom, burden of care beyond caregiver ability, home environment not conducive to patient recovery, and limited safety awareness.    Goal(s) :   To be met in 3 Visits:  Bed to toilet/BSC:       SBA    To be met in 5 Visits:  Supine to/from Sit in preparation for ADL task:   Supervision  Toileting        CGA  Grooming       Supervision  Upper Body Dressing:      Supervision  Lower Body Dressing:      CGA  Pt to demonstrate UE therapeutic exs x 15 reps with minimal cues    Rehabilitation Potential: Good  Strengths for achieving goals include: Family Support and Pt cooperative   Barriers to achieving goals include:  Pain and Weakness    Plan:  To be seen 3-5 x/wk while in acute care setting for therapeutic exercises, bed mobility, transfers, family/patient education, ADL/IADL retraining, and energy conservation training.    Electronically signed by Aminah Pascal on 6/14/2024 at 12:27 PM    Aminah Pascal OTS     Occupational therapy treatment completed under supervision of this therapist.  JORJE Aguillon/L #[id number]      If patient discharges from this facility prior to next visit, this note will serve as the Discharge Summary    
troponin    COPD exacerbation (HCC)    Moderate dementia (HCC)  Resolved Problems:    * No resolved hospital problems. *      Additional Pertinent Information and Pt-Reported Symptoms/Complaints: pt admitted d/t acute respiratory failure with hypoxia, SOB, COPD exacerbation.  Pt has PMHx of COPD, CAD, reflux, etoh abuse, and dementia per chart review.  Pt is a current smoker.  Note pt had cervical fusion completed in July 2023 per chart.  Pt endorsed having thrush (currently being medically managed per pt and chart).      No hx of dysphagia/ST per chart review.      Predisposing dysphagia risk factors: COPD, Cognitive Deficit, GERD, and Current smoker  Clinical signs of possible chronic dysphagia: N/A  Precipitating dysphagia risk factors: reduced physical mobility and increased O2 demands    Code Status as listed in chart:  Full Code      Cranial nerve exam:   CN V (trigeminal): ophthalmic, maxillary, and mandibular facial sensation- WFL  CN VII (facial): WFL  CN IX/X (glossopharyngeal/vagus): MPT: DNT; pitch range: Adequate; vocal quality: Adequate; cough: Strong-perceptually, Non-Productive, and Dry  CN XII (hypoglossal): WFL    Laryngeal function exam:   Secretions: n/a  Vocal quality: See CN exam above  MPT: See CN exam above  S/Z ratio: DNT  Pitch range: See CN exam above  Cough: See CN exam above    Oral Care Status:    Edentulous    Oral Care Completed?   [] Yes   [x] No  Not warranted-oral care adequate upon assessment    PO trials:   Baseline cough noted prior to PO trials? [] Yes   [x] No  Baseline SPO2%: 93%  Baseline RR: 21/min  Supplemental O2 Status: RA       IDDSI 0 (thin):   - Cup and straw: no anterior bolus loss , suspect premature bolus loss into pharynx, no clinical s/s of aspiration, and vitals stable.  Intermittent aerophagia noted post-swallow.    IDDSI 4 (puree): no anterior bolus loss , suspect functional A-P bolus transit, oral clearance grossly WFL, no clinical s/s of aspiration, and

## 2024-06-16 NOTE — DISCHARGE SUMMARY
Name:  Osvaldo Noble  Room:  0208/0208-01  MRN:    [id number]    Discharge Summary      This discharge summary is in conjunction with a complete physical exam done on the day of discharge.      Discharging Physician: NIMO SIGALA MD      Admit: 6/12/2024  Discharge:  6/16/2024     Diagnoses this Admission    Principal Problem:    Acute respiratory failure with hypoxia (HCC)  Active Problems:    Elevated troponin    COPD exacerbation (HCC)    Moderate dementia (HCC)    Neck pain    Hypoxemia  Resolved Problems:    * No resolved hospital problems. *      Procedures (Please Review Full Report for Details)  None     Consults    IP CONSULT TO PULMONOLOGY  IP CONSULT TO NEUROLOGY  IP CONSULT TO CARDIOLOGY      HPI:    Osvaldo Noble is a 48 y.o. male with a PMH of mild COPD, hypertension, hyperlipidemia, CAD status post PCI, PTSD, GERD, cervical spine surgery who presented to ED with complaint of shortness of breath.     Patient endorses that over the last few days he has had complaints of wheezing shortness of breath and productive cough of whitish sputum.  States that he was brought to the ED due to the fact that he has had recent falls with his leg giving out.  Had cervical spine surgery in 2023 and has had complications subsequently with weakness in his upper and lower extremities.  Has seen neurology and his spine surgeon for follow-up.  No loss of consciousness with fall or hitting his head.  On presenting in the ED patient was found to be hypoxic in the 80s subsequently placed on NC oxygen.  Blood pressure 108/79, pulse of 73, temperature 98.2, respiration 20, saturating 93%.  BMP stable, proBNP 167, troponin 75/70, WBC 15.4 hemoglobin 11.1.  Influenza A/B/COVID-negative.  Urinalysis not indicative of UTI.  VBG shows pH of 7427, pCO2 of 41.7, pO2 of 97.2, bicarb of 26.9.  Chest x-ray shows mild vascular congestion and bibasilar subsegmental atelectasis, CT chest shows no evidence of PE, bilateral  Detail Level: Zone Detail Level: Detailed

## 2024-06-16 NOTE — FLOWSHEET NOTE
06/15/24 1952   Vital Signs   Temp 97.1 °F (36.2 °C)   Temp Source Oral   Pulse 89   Heart Rate Source Monitor   Respirations 16   /74   MAP (Calculated) 88   BP Location Left upper arm   BP Method Automatic   Patient Position Sitting   Oxygen Therapy   SpO2 94 %   O2 Device None (Room air)     PM assessment complete see flowsheet. Medications given as ordered. Vital signs stable. Plan of care reviewed. No other needs identified at this time. ,

## 2024-06-16 NOTE — PLAN OF CARE
Problem: Discharge Planning  Goal: Discharge to home or other facility with appropriate resources  6/13/2024 2225 by Kory Franz RN  Outcome: Progressing  6/13/2024 1425 by Jaylen Gibson RN  Outcome: Progressing  Flowsheets (Taken 6/13/2024 0257 by Roberta Jim, RN)  Discharge to home or other facility with appropriate resources: Refer to discharge planning if patient needs post-hospital services based on physician order or complex needs related to functional status, cognitive ability or social support system     Problem: Pain  Goal: Verbalizes/displays adequate comfort level or baseline comfort level  Outcome: Progressing     Problem: Safety - Adult  Goal: Free from fall injury  Outcome: Progressing     Problem: Skin/Tissue Integrity  Goal: Absence of new skin breakdown  Description: 1.  Monitor for areas of redness and/or skin breakdown  2.  Assess vascular access sites hourly  3.  Every 4-6 hours minimum:  Change oxygen saturation probe site  4.  Every 4-6 hours:  If on nasal continuous positive airway pressure, respiratory therapy assess nares and determine need for appliance change or resting period.  Outcome: Progressing     Problem: Neurosensory - Adult  Goal: Achieves stable or improved neurological status  Outcome: Progressing  Goal: Absence of seizures  Outcome: Progressing  Goal: Remains free of injury related to seizures activity  Outcome: Progressing  Goal: Achieves maximal functionality and self care  Outcome: Progressing     Problem: Respiratory - Adult  Goal: Achieves optimal ventilation and oxygenation  6/13/2024 2225 by Kory Franz RN  Outcome: Progressing  6/13/2024 1425 by Jaylen Gibson RN  Outcome: Progressing     Problem: Cardiovascular - Adult  Goal: Maintains optimal cardiac output and hemodynamic stability  6/13/2024 2225 by Kory Franz RN  Outcome: Progressing  6/13/2024 1425 by Jaylen Gibson RN  Outcome: Progressing  Goal: Absence of cardiac dysrhythmias or at 
  Problem: Discharge Planning  Goal: Discharge to home or other facility with appropriate resources  6/15/2024 1033 by Hannah Bravo RN  Outcome: Progressing     Problem: Pain  Goal: Verbalizes/displays adequate comfort level or baseline comfort level  6/15/2024 1033 by Hannah Bravo RN  Outcome: Progressing     Problem: Safety - Adult  Goal: Free from fall injury  6/15/2024 1033 by Hannah Bravo RN  Outcome: Progressing     Problem: Skin/Tissue Integrity  Goal: Absence of new skin breakdown  Description: 1.  Monitor for areas of redness and/or skin breakdown  2.  Assess vascular access sites hourly  3.  Every 4-6 hours minimum:  Change oxygen saturation probe site  4.  Every 4-6 hours:  If on nasal continuous positive airway pressure, respiratory therapy assess nares and determine need for appliance change or resting period.  6/15/2024 1033 by Hannah Bravo RN  Outcome: Progressing     Problem: Neurosensory - Adult  Goal: Achieves stable or improved neurological status  6/15/2024 1033 by Hannah Bravo RN  Outcome: Progressing     Problem: Neurosensory - Adult  Goal: Absence of seizures  6/15/2024 1033 by Hannah Bravo RN  Outcome: Progressing     Problem: Neurosensory - Adult  Goal: Remains free of injury related to seizures activity  6/15/2024 1033 by Hannah Bravo RN  Outcome: Progressing     Problem: Neurosensory - Adult  Goal: Achieves maximal functionality and self care  6/15/2024 1033 by Hannah Bravo RN  Outcome: Progressing     Problem: Respiratory - Adult  Goal: Achieves optimal ventilation and oxygenation  6/15/2024 1033 by Hannah Bravo RN  Outcome: Progressing     Problem: Cardiovascular - Adult  Goal: Maintains optimal cardiac output and hemodynamic stability  6/15/2024 1033 by Hannah Bravo RN  Outcome: Progressing     Problem: Cardiovascular - Adult  Goal: Absence of cardiac dysrhythmias or at baseline  6/15/2024 1033 by Hannah Bravo RN  Outcome: Completed     Problem: Musculoskeletal - Adult  Goal: 
  Problem: Discharge Planning  Goal: Discharge to home or other facility with appropriate resources  6/15/2024 2327 by Karen Davalos RN  Outcome: Progressing  6/15/2024 1033 by Hannah Bravo RN  Outcome: Progressing     Problem: Pain  Goal: Verbalizes/displays adequate comfort level or baseline comfort level  6/15/2024 2327 by Karen Davalos RN  Outcome: Progressing  6/15/2024 1033 by Hannah Bravo RN  Outcome: Progressing     Problem: Safety - Adult  Goal: Free from fall injury  6/15/2024 2327 by Karen Davalos RN  Outcome: Progressing  6/15/2024 1033 by Hannah Bravo RN  Outcome: Progressing     Problem: Neurosensory - Adult  Goal: Achieves stable or improved neurological status  6/15/2024 2327 by Karen Davalos RN  Outcome: Progressing  6/15/2024 1033 by Hannah Bravo RN  Outcome: Progressing  Goal: Absence of seizures  6/15/2024 2327 by Karen Davalos RN  Outcome: Progressing  6/15/2024 1033 by Hannah Bravo RN  Outcome: Progressing  Goal: Remains free of injury related to seizures activity  6/15/2024 2327 by Karen Davalos RN  Outcome: Progressing  6/15/2024 1033 by Hannah Bravo RN  Outcome: Progressing  Goal: Achieves maximal functionality and self care  6/15/2024 2327 by Karen Davalos RN  Outcome: Progressing  6/15/2024 1033 by Hannah Bravo RN  Outcome: Progressing     
  Problem: Discharge Planning  Goal: Discharge to home or other facility with appropriate resources  6/16/2024 1037 by Jay Mora RN  Outcome: Progressing  6/15/2024 2327 by Karen Davalos RN  Outcome: Progressing     Problem: Pain  Goal: Verbalizes/displays adequate comfort level or baseline comfort level  6/16/2024 1037 by Jay Mora RN  Outcome: Progressing  6/15/2024 2327 by Karen Davalos RN  Outcome: Progressing     Problem: Safety - Adult  Goal: Free from fall injury  6/16/2024 1037 by Jay Mora RN  Outcome: Progressing  6/15/2024 2327 by Karen Davalos RN  Outcome: Progressing     Problem: Skin/Tissue Integrity  Goal: Absence of new skin breakdown  Description: 1.  Monitor for areas of redness and/or skin breakdown  2.  Assess vascular access sites hourly  3.  Every 4-6 hours minimum:  Change oxygen saturation probe site  4.  Every 4-6 hours:  If on nasal continuous positive airway pressure, respiratory therapy assess nares and determine need for appliance change or resting period.  6/16/2024 1037 by Jay Mora RN  Outcome: Progressing  6/15/2024 2327 by Karen Davalos RN  Outcome: Progressing     
  Problem: Discharge Planning  Goal: Discharge to home or other facility with appropriate resources  Outcome: Progressing  Flowsheets (Taken 6/13/2024 0257 by Roberta Jim, RN)  Discharge to home or other facility with appropriate resources: Refer to discharge planning if patient needs post-hospital services based on physician order or complex needs related to functional status, cognitive ability or social support system     Problem: Safety - Adult  Goal: Free from fall injury  6/13/2024 0330 by Roberta Jim, RN  Outcome: Progressing     Problem: Respiratory - Adult  Goal: Achieves optimal ventilation and oxygenation  Outcome: Progressing     Problem: Cardiovascular - Adult  Goal: Maintains optimal cardiac output and hemodynamic stability  Outcome: Progressing  Goal: Absence of cardiac dysrhythmias or at baseline  Outcome: Progressing     Problem: Genitourinary - Adult  Goal: Absence of urinary retention  6/13/2024 0331 by Roberta Jim, RN  Outcome: Progressing     
  Problem: Safety - Adult  Goal: Free from fall injury  Outcome: Progressing     Problem: Genitourinary - Adult  Goal: Absence of urinary retention  Outcome: Progressing     
Bedside swallow evaluation completed this date.    Larisa Freed M.S. CCC-SLP  Speech-language pathologist  [id number]      
and determine need for appliance change or resting period.  Outcome: Adequate for Discharge

## 2024-06-17 ENCOUNTER — TELEPHONE (OUTPATIENT)
Dept: PULMONOLOGY | Age: 49
End: 2024-06-17

## 2024-06-18 ENCOUNTER — HOSPITAL ENCOUNTER (OUTPATIENT)
Dept: NEUROLOGY | Age: 49
Discharge: HOME | End: 2024-06-18
Payer: COMMERCIAL

## 2024-06-18 DIAGNOSIS — E51.9 THIAMINE DEFICIENCY: ICD-10-CM

## 2024-06-18 DIAGNOSIS — F02.B0 MODERATE DEMENTIA ASSOCIATED WITH OTHER UNDERLYING DISEASE, UNSPECIFIED WHETHER BEHAVIORAL, PSYCHOTIC, OR MOOD DISTURBANCE OR ANXIETY (HCC): ICD-10-CM

## 2024-06-18 PROCEDURE — 95813 EEG EXTND MNTR 61-119 MIN: CPT

## 2024-06-18 PROCEDURE — 95813 EEG EXTND MNTR 61-119 MIN: CPT | Performed by: PSYCHIATRY & NEUROLOGY

## 2024-06-18 NOTE — PROCEDURES
INTERPRETATION:  This 61-minute, computer-assisted video EEG recording is abnormal.  It showed mild to moderate degree of generalized slowing background activity.  No potentially epileptiform activity was present during the recording.       The EEG findings were consistent with mild to moderate degree of generalized non-specific cerebral dysfunction.     CLASSIFICATION:  Dysrhythmia grade 2, generalized.  Sleep - unsuccessful.  EKG channel.     DESCRIPTION:     BACKGROUND:  The awake recording revealed 9 Hz alpha activity over the posterior head region.  There were increased 2 to 7 Hz theta/delta activity into the EEG background.  Given the extensive study, the patient still did not sleep.  The EEG showed normal V waves during drowsiness.  There was no significant change on the EEG background with photic stimulation.  Hyperventilation was omitted due to the patient's condition.  INTERICTAL DISCHARGES: None     CLINICAL EVENTS:  None     The EKG channel was unremarkable.

## 2024-06-18 NOTE — PROGRESS NOTES
EEG completed and available for interpretation on the [facility] database .

## 2024-06-27 ENCOUNTER — TELEPHONE (OUTPATIENT)
Dept: ENT CLINIC | Age: 49
End: 2024-06-27

## 2024-06-27 NOTE — TELEPHONE ENCOUNTER
[facility] came into office requesting all medical records from  and be faxed to  [phone].  Record release form in Media

## 2024-07-13 PROBLEM — R79.89 ELEVATED TROPONIN: Status: RESOLVED | Noted: 2024-06-13 | Resolved: 2024-07-13

## 2024-07-25 ENCOUNTER — TELEPHONE (OUTPATIENT)
Dept: PULMONOLOGY | Age: 49
End: 2024-07-25

## 2024-07-25 ENCOUNTER — HOSPITAL ENCOUNTER (OUTPATIENT)
Age: 49
Discharge: HOME | End: 2024-07-25
Payer: COMMERCIAL

## 2024-07-25 ENCOUNTER — OFFICE VISIT (OUTPATIENT)
Dept: PULMONOLOGY | Age: 49
End: 2024-07-25
Payer: COMMERCIAL

## 2024-07-25 VITALS
HEART RATE: 85 BPM | HEIGHT: 70 IN | SYSTOLIC BLOOD PRESSURE: 110 MMHG | DIASTOLIC BLOOD PRESSURE: 68 MMHG | WEIGHT: 225.8 LBS | OXYGEN SATURATION: 90 % | BODY MASS INDEX: 32.33 KG/M2

## 2024-07-25 DIAGNOSIS — J84.9 ILD (INTERSTITIAL LUNG DISEASE) (HCC): Primary | ICD-10-CM

## 2024-07-25 DIAGNOSIS — J84.9 ILD (INTERSTITIAL LUNG DISEASE) (HCC): ICD-10-CM

## 2024-07-25 LAB — EOSINOPHIL # BLD: 0.2 K/UL (ref 0–0.6)

## 2024-07-25 PROCEDURE — 36415 COLL VENOUS BLD VENIPUNCTURE: CPT

## 2024-07-25 PROCEDURE — G8417 CALC BMI ABV UP PARAM F/U: HCPCS | Performed by: INTERNAL MEDICINE

## 2024-07-25 PROCEDURE — 82785 ASSAY OF IGE: CPT

## 2024-07-25 PROCEDURE — 86038 ANTINUCLEAR ANTIBODIES: CPT

## 2024-07-25 PROCEDURE — 4004F PT TOBACCO SCREEN RCVD TLK: CPT | Performed by: INTERNAL MEDICINE

## 2024-07-25 PROCEDURE — 85048 AUTOMATED LEUKOCYTE COUNT: CPT

## 2024-07-25 PROCEDURE — 99214 OFFICE O/P EST MOD 30 MIN: CPT | Performed by: INTERNAL MEDICINE

## 2024-07-25 PROCEDURE — G8427 DOCREV CUR MEDS BY ELIG CLIN: HCPCS | Performed by: INTERNAL MEDICINE

## 2024-07-25 PROCEDURE — 86003 ALLG SPEC IGE CRUDE XTRC EA: CPT

## 2024-07-25 RX ORDER — IPRATROPIUM BROMIDE AND ALBUTEROL SULFATE 2.5; .5 MG/3ML; MG/3ML
SOLUTION RESPIRATORY (INHALATION)
COMMUNITY
Start: 2024-07-18

## 2024-07-25 RX ORDER — PREDNISONE 20 MG/1
20 TABLET ORAL DAILY
Qty: 5 TABLET | Refills: 0 | Status: SHIPPED | OUTPATIENT
Start: 2024-07-25 | End: 2024-07-30

## 2024-07-25 RX ORDER — CALCIUM CARBONATE 500(1250)
TABLET,CHEWABLE ORAL
COMMUNITY

## 2024-07-25 RX ORDER — ALBUTEROL SULFATE 90 UG/1
AEROSOL, METERED RESPIRATORY (INHALATION)
COMMUNITY
Start: 2024-06-04

## 2024-07-25 RX ORDER — EMPAGLIFLOZIN 10 MG/1
TABLET, FILM COATED ORAL
COMMUNITY
Start: 2024-07-05

## 2024-07-25 RX ORDER — AZITHROMYCIN 250 MG/1
250 TABLET, FILM COATED ORAL DAILY
Qty: 15 TABLET | Refills: 0 | Status: SHIPPED | OUTPATIENT
Start: 2024-07-25 | End: 2024-08-09

## 2024-07-25 RX ORDER — BETHANECHOL CHLORIDE 25 MG/1
TABLET ORAL
COMMUNITY
Start: 2024-07-09

## 2024-07-25 RX ORDER — DOCUSATE SODIUM 100 MG/1
CAPSULE, LIQUID FILLED ORAL
COMMUNITY
Start: 2024-07-18

## 2024-07-25 RX ORDER — LORATADINE 10 MG/1
TABLET ORAL
COMMUNITY
Start: 2024-07-08

## 2024-07-25 NOTE — PROGRESS NOTES
[facility]                                 Pulmonary/Critical care  Consult /Progress Note :                                                                  CC :worsening SOB   Patient is being seen at the request of Amita Ellis  for a consultation for COPD ,abnormal CT     HISTORY OF PRESENT ILLNESS:   Patient is a 48-year-old male with history of COPD seen by Dr. Abreu  In 2024 with restrictive lung disease with FEV1 71 and TLC 70 and DLCO 60    He is also known history of sleep apnea and on CPAP,now he does not use and gave machine back       Patient stated he had neck surgery 1 year ago since then he is having a tremor bilateral extremity along with weakness and worsening shortness of breath reason for him to come to the hospital and stated       He has about 60 pack.  Smoking history      If follow-up with neurology and his spine surgeon      Today visit    He still with SOB and cough   His sputum still yellow to dark sputum   He is on 3.5 L O2   No CP     PAST MEDICAL HISTORY:  Past Medical History:   Diagnosis Date    Anxiety     CAD (coronary artery disease)     COPD (chronic obstructive pulmonary disease) ([facility])     Depression     Gout     both knees and feet    Hyperlipidemia     Insomnia     NSTEMI (non-ST elevated myocardial infarction) ([facility]) 12/30/2018    Panic disorder     Positive urine drug screen 12/2018    opiate, cocaine, benzos    PTSD (post-traumatic stress disorder)     Reflux     Scoliosis     Traumatic rupture of spleen     Wears glasses      PAST SURGICAL HISTORY:  Past Surgical History:   Procedure Laterality Date    CERVICAL SPINE SURGERY  07/10/2023    cervical spine fusion surgery,Pt has 2 plates & 4 screws.    CORONARY ANGIOPLASTY WITH STENT PLACEMENT      2019    HERNIA REPAIR      KNEE SURGERY Left     ACL    PTCA  12/30/2018    ROTATOR CUFF REPAIR Right     UPPER GASTROINTESTINAL ENDOSCOPY N/A

## 2024-07-25 NOTE — TELEPHONE ENCOUNTER
Patient had OV on 7/25. Needs follow-up in 3 months. Needs PFT/6MW, and HRCT prior to appt. Patient's spouse was unable to schedule at check-out due to not having her calendar with her. She states she will call the office later to get these appointments scheduled.

## 2024-07-26 LAB
ANA SER QL IA: NEGATIVE
IGE SERPL-ACNC: 45 IU/ML (ref 0–100)

## 2024-07-31 LAB
A ALTERNATA IGE QN: <0.1 KU/L (ref 0–0.34)
A FUMIGATUS IGE QN: <0.1 KU/L (ref 0–0.34)
AMER SYCAMORE IGE QN: <0.1 KU/L (ref 0–0.34)
BERMUDA GRASS IGE QN: <0.1 KU/L (ref 0–0.34)
BOXELDER IGE QN: <0.1 KU/L (ref 0–0.34)
C SPHAEROSPERMUM IGE QN: <0.1 KUL/L (ref 0–0.34)
CALIF WALNUT IGE QN: <0.1 KU/L (ref 0–0.34)
CAT DANDER IGE QN: <0.1 KU/L (ref 0–0.34)
CMN PIGWEED IGE QN: <0.1 KU/L (ref 0–0.34)
COMMON RAGWEED IGE QN: <0.1 KU/L (ref 0–0.34)
COTTONWOOD IGE QN: <0.1 KU/L (ref 0–0.34)
D FARINAE IGE QN: <0.1 KU/L (ref 0–0.34)
D PTERONYSS IGE QN: <0.1 KU/L (ref 0–0.34)
DOG DANDER IGE QN: <0.1 KU/L (ref 0–0.34)
IGE SERPL-ACNC: 45 IU/ML (ref 0–100)
M RACEMOSUS IGE QN: <0.1 KU/L (ref 0–0.34)
MOUSE EPITH IGE QN: <0.1 KU/L (ref 0–0.34)
P NOTATUM IGE QN: <0.1 KU/L (ref 0–0.34)
PECAN/HICK TREE IGE QN: <0.1 KU/L (ref 0–0.34)
RED CEDAR IGE QN: <0.1 KU/L (ref 0–0.34)
ROACH IGE QN: <0.1 KU/L (ref 0–0.34)
SALTWORT IGE QN: <0.1 KU/L (ref 0–0.34)
SHEEP SORREL IGE QN: <0.1 KU/L (ref 0–0.34)
SILVER BIRCH IGE QN: <0.1 KU/L (ref 0–0.34)
TIMOTHY IGE QN: <0.1 KU/L (ref 0–0.34)
WHITE ASH IGE QN: <0.1 KU/L (ref 0–0.34)
WHITE ELM IGE QN: <0.1 KU/L (ref 0–0.34)
WHITE MULBERRY IGE QN: <0.1 KU/L (ref 0–0.34)
WHITE OAK IGE QN: <0.1 KU/L (ref 0–0.34)

## 2024-08-13 ENCOUNTER — TELEPHONE (OUTPATIENT)
Dept: PULMONOLOGY | Age: 49
End: 2024-08-13

## 2024-08-13 NOTE — TELEPHONE ENCOUNTER
Submitted PA for STIOLTO  [facility] Key: [id number]  STATUS: Your PA has been resolved, no additional PA is required.     Follow up done daily; if no decision with in three days we will refax.  If another three days goes by with no decision will call the insurance for status.

## 2024-08-27 ENCOUNTER — OFFICE VISIT (OUTPATIENT)
Age: 49
End: 2024-08-27
Payer: COMMERCIAL

## 2024-08-27 VITALS
OXYGEN SATURATION: 90 % | HEIGHT: 70 IN | HEART RATE: 64 BPM | DIASTOLIC BLOOD PRESSURE: 60 MMHG | SYSTOLIC BLOOD PRESSURE: 88 MMHG | BODY MASS INDEX: 32.07 KG/M2 | WEIGHT: 224 LBS

## 2024-08-27 DIAGNOSIS — F02.B0 MODERATE DEMENTIA ASSOCIATED WITH OTHER UNDERLYING DISEASE, UNSPECIFIED WHETHER BEHAVIORAL, PSYCHOTIC, OR MOOD DISTURBANCE OR ANXIETY (HCC): Primary | ICD-10-CM

## 2024-08-27 DIAGNOSIS — G25.81 RESTLESS LEG SYNDROME: ICD-10-CM

## 2024-08-27 PROCEDURE — 4004F PT TOBACCO SCREEN RCVD TLK: CPT | Performed by: PSYCHIATRY & NEUROLOGY

## 2024-08-27 PROCEDURE — 99214 OFFICE O/P EST MOD 30 MIN: CPT | Performed by: PSYCHIATRY & NEUROLOGY

## 2024-08-27 PROCEDURE — G8417 CALC BMI ABV UP PARAM F/U: HCPCS | Performed by: PSYCHIATRY & NEUROLOGY

## 2024-08-27 PROCEDURE — G8427 DOCREV CUR MEDS BY ELIG CLIN: HCPCS | Performed by: PSYCHIATRY & NEUROLOGY

## 2024-08-27 RX ORDER — CLONAZEPAM 1 MG/1
1 TABLET ORAL NIGHTLY
COMMUNITY

## 2024-08-27 RX ORDER — THIAMINE MONONITRATE (VIT B1) 100 MG
100 TABLET ORAL DAILY
Qty: 90 TABLET | Refills: 1 | Status: SHIPPED | OUTPATIENT
Start: 2024-08-27

## 2024-08-27 RX ORDER — MEMANTINE HYDROCHLORIDE 10 MG/1
10 TABLET ORAL 2 TIMES DAILY
Qty: 180 TABLET | Refills: 1 | Status: SHIPPED | OUTPATIENT
Start: 2024-08-27

## 2024-08-27 RX ORDER — METHADONE HYDROCHLORIDE 10 MG/1
20 TABLET ORAL 2 TIMES DAILY
COMMUNITY

## 2024-08-27 RX ORDER — ROPINIROLE 1 MG/1
1 TABLET, FILM COATED ORAL NIGHTLY
Qty: 90 TABLET | Refills: 1 | Status: SHIPPED | OUTPATIENT
Start: 2024-08-27

## 2024-08-27 RX ORDER — LANOLIN ALCOHOL/MO/W.PET/CERES
1000 CREAM (GRAM) TOPICAL DAILY
Qty: 90 TABLET | Refills: 1 | Status: SHIPPED | OUTPATIENT
Start: 2024-08-27

## 2024-08-27 NOTE — PATIENT INSTRUCTIONS
YOU MUST CONFIRM YOUR APPOINTMENT 1 DAY PRIOR OR IT WILL BE CANCELLED!!   Our office will call you 3 times the day prior to your appointment in an attempt to confirm.  Please return our call ASAP or confirm your appt through [employer] no later than 3 pm the day before your appointment.  If we do not hear back from you by 3 pm to confirm, your appointment will be cancelled & someone will be added into that slot from our wait list.

## 2024-09-11 ENCOUNTER — TELEPHONE (OUTPATIENT)
Dept: ORTHOPEDIC SURGERY | Age: 49
End: 2024-09-11

## 2024-10-08 ENCOUNTER — HOSPITAL ENCOUNTER (OUTPATIENT)
Dept: PULMONOLOGY | Age: 49
Discharge: HOME | End: 2024-10-08
Attending: INTERNAL MEDICINE
Payer: COMMERCIAL

## 2024-10-08 ENCOUNTER — HOSPITAL ENCOUNTER (OUTPATIENT)
Dept: CT IMAGING | Age: 49
Discharge: HOME | End: 2024-10-08
Attending: INTERNAL MEDICINE
Payer: COMMERCIAL

## 2024-10-08 DIAGNOSIS — J84.9 ILD (INTERSTITIAL LUNG DISEASE) (HCC): ICD-10-CM

## 2024-10-08 LAB
DLCO %PRED: 52 %
DLCO PRED: NORMAL
DLCO/VA %PRED: NORMAL
DLCO/VA PRED: NORMAL
DLCO/VA: NORMAL
DLCO: NORMAL
EXPIRATORY TIME-POST: NORMAL
EXPIRATORY TIME: NORMAL
FEF 25-75 %CHNG: NORMAL
FEF 25-75 POST %PRED: NORMAL
FEF 25-75% %PRED-PRE: NORMAL
FEF 25-75% PRED: NORMAL
FEF 25-75-POST: NORMAL
FEF 25-75-PRE: NORMAL
FEV1 %PRED-POST: NORMAL
FEV1 %PRED-PRE: 53 %
FEV1 PRED: NORMAL
FEV1-POST: NORMAL
FEV1-PRE: NORMAL
FEV1/FVC %PRED-POST: NORMAL
FEV1/FVC %PRED-PRE: NORMAL
FEV1/FVC PRED: NORMAL
FEV1/FVC-POST: NORMAL
FEV1/FVC-PRE: 105 %
FVC %PRED-POST: NORMAL
FVC %PRED-PRE: NORMAL
FVC PRED: NORMAL
FVC-POST: NORMAL
FVC-PRE: NORMAL
GAW %PRED: NORMAL
GAW PRED: NORMAL
GAW: NORMAL
IC PRE %PRED: NORMAL
IC PRED: NORMAL
IC: NORMAL
MEP: NORMAL
MIP: NORMAL
MVV %PRED-PRE: NORMAL
MVV PRED: NORMAL
MVV-PRE: NORMAL
PEF %PRED-POST: NORMAL
PEF %PRED-PRE: NORMAL
PEF PRED: NORMAL
PEF%CHNG: NORMAL
PEF-POST: NORMAL
PEF-PRE: NORMAL
RAW %PRED: NORMAL
RAW PRED: NORMAL
RAW: NORMAL
RV PRE %PRED: NORMAL
RV PRED: NORMAL
RV: NORMAL
SVC %PRED: NORMAL
SVC PRED: NORMAL
SVC: NORMAL
TLC PRE %PRED: NORMAL
TLC PRED: NORMAL
TLC: NORMAL
VA %PRED: NORMAL
VA PRED: NORMAL
VA: NORMAL
VTG %PRED: NORMAL
VTG PRED: NORMAL
VTG: NORMAL

## 2024-10-08 PROCEDURE — 94375 RESPIRATORY FLOW VOLUME LOOP: CPT

## 2024-10-08 PROCEDURE — 94726 PLETHYSMOGRAPHY LUNG VOLUMES: CPT

## 2024-10-08 PROCEDURE — 94729 DIFFUSING CAPACITY: CPT

## 2024-10-08 PROCEDURE — 94760 N-INVAS EAR/PLS OXIMETRY 1: CPT

## 2024-10-08 PROCEDURE — 71250 CT THORAX DX C-: CPT

## 2024-10-08 PROCEDURE — 94618 PULMONARY STRESS TESTING: CPT

## 2024-10-08 RX ORDER — ALBUTEROL SULFATE 90 UG/1
4 INHALANT RESPIRATORY (INHALATION) ONCE
Status: DISCONTINUED | OUTPATIENT
Start: 2024-10-08 | End: 2024-10-08

## 2024-10-08 ASSESSMENT — PULMONARY FUNCTION TESTS
FEV1_PERCENT_PREDICTED_PRE: 53
FEV1/FVC_PRE: 105

## 2024-10-17 ENCOUNTER — OFFICE VISIT (OUTPATIENT)
Dept: PULMONOLOGY | Age: 49
End: 2024-10-17
Payer: COMMERCIAL

## 2024-10-17 VITALS
HEIGHT: 70 IN | RESPIRATION RATE: 18 BRPM | DIASTOLIC BLOOD PRESSURE: 62 MMHG | OXYGEN SATURATION: 82 % | WEIGHT: 218.2 LBS | SYSTOLIC BLOOD PRESSURE: 106 MMHG | BODY MASS INDEX: 31.24 KG/M2 | HEART RATE: 76 BPM

## 2024-10-17 DIAGNOSIS — J44.9 CHRONIC OBSTRUCTIVE PULMONARY DISEASE, UNSPECIFIED COPD TYPE (HCC): Primary | ICD-10-CM

## 2024-10-17 PROCEDURE — 3023F SPIROM DOC REV: CPT | Performed by: INTERNAL MEDICINE

## 2024-10-17 PROCEDURE — G8428 CUR MEDS NOT DOCUMENT: HCPCS | Performed by: INTERNAL MEDICINE

## 2024-10-17 PROCEDURE — 4004F PT TOBACCO SCREEN RCVD TLK: CPT | Performed by: INTERNAL MEDICINE

## 2024-10-17 PROCEDURE — G8417 CALC BMI ABV UP PARAM F/U: HCPCS | Performed by: INTERNAL MEDICINE

## 2024-10-17 PROCEDURE — 99214 OFFICE O/P EST MOD 30 MIN: CPT | Performed by: INTERNAL MEDICINE

## 2024-10-17 PROCEDURE — G8484 FLU IMMUNIZE NO ADMIN: HCPCS | Performed by: INTERNAL MEDICINE

## 2024-10-17 RX ORDER — AZITHROMYCIN 250 MG/1
250 TABLET, FILM COATED ORAL
Qty: 15 TABLET | Refills: 1 | Status: SHIPPED | OUTPATIENT
Start: 2024-10-17 | End: 2024-12-16

## 2024-10-17 RX ORDER — PREDNISONE 10 MG/1
10 TABLET ORAL DAILY
Qty: 30 TABLET | Refills: 0 | Status: SHIPPED | OUTPATIENT
Start: 2024-10-17 | End: 2024-11-16

## 2024-10-17 NOTE — PROGRESS NOTES
[facility]                                 Pulmonary/Critical care  Consult /Progress Note :                                                                  CC :worsening SOB   Patient is being seen at the request of Amita Ellis  for a consultation for COPD ,abnormal CT     HISTORY OF PRESENT ILLNESS:   Patient is a 48-year-old male with history of COPD seen by Dr. Abreu  In 2024 with restrictive lung disease with FEV1 71 and TLC 70 and DLCO 60    He is also known history of sleep apnea and on CPAP,now he does not use and gave machine back       Patient stated he had neck surgery 1 year ago since then he is having a tremor bilateral extremity along with weakness and worsening shortness of breath reason for him to come to the hospital and stated       He has about 60 pack.  Smoking history      If follow-up with neurology and his spine surgeon      Today visit    He still with SOB and cough   Still smoke   Cough and clear   Has alot of wheezing   His sputum still yellow to dark sputum   He is on 3.5 L O2   No CP     PAST MEDICAL HISTORY:  Past Medical History:   Diagnosis Date    Anxiety     CAD (coronary artery disease)     COPD (chronic obstructive pulmonary disease) ([facility])     Depression     Gout     both knees and feet    Hyperlipidemia     Insomnia     NSTEMI (non-ST elevated myocardial infarction) ([facility]) 12/30/2018    Panic disorder     Positive urine drug screen 12/2018    opiate, cocaine, benzos    PTSD (post-traumatic stress disorder)     Reflux     Scoliosis     Traumatic rupture of spleen     Wears glasses      PAST SURGICAL HISTORY:  Past Surgical History:   Procedure Laterality Date    CERVICAL SPINE SURGERY  07/10/2023    cervical spine fusion surgery,Pt has 2 plates & 4 screws.    CORONARY ANGIOPLASTY WITH STENT PLACEMENT      2019    HERNIA REPAIR      KNEE SURGERY Left     ACL    PTCA  12/30/2018    ROTATOR CUFF

## 2025-02-05 ENCOUNTER — TELEPHONE (OUTPATIENT)
Dept: ADMINISTRATIVE | Age: 50
End: 2025-02-05

## 2025-02-05 NOTE — TELEPHONE ENCOUNTER
Submitted PA for [facility] CMM Key: [id number] STATUS: APPROVED 2/1/2025-2/5/2026.    Authorization Expiration Date: 2/4/2026.    If this requires a response please respond to the pool ( P [facility] MEDICATION PRE-AUTH).      Thank you please advise patient.

## 2025-03-04 DIAGNOSIS — F02.B0 MODERATE DEMENTIA ASSOCIATED WITH OTHER UNDERLYING DISEASE, UNSPECIFIED WHETHER BEHAVIORAL, PSYCHOTIC, OR MOOD DISTURBANCE OR ANXIETY (HCC): ICD-10-CM

## 2025-03-06 RX ORDER — MEMANTINE HYDROCHLORIDE 10 MG/1
TABLET ORAL
Qty: 180 TABLET | Refills: 0 | Status: SHIPPED | OUTPATIENT
Start: 2025-03-06

## 2025-03-27 ENCOUNTER — TELEPHONE (OUTPATIENT)
Age: 50
End: 2025-03-27

## 2025-03-27 ENCOUNTER — OFFICE VISIT (OUTPATIENT)
Age: 50
End: 2025-03-27
Payer: MEDICARE

## 2025-03-27 VITALS
WEIGHT: 202 LBS | DIASTOLIC BLOOD PRESSURE: 78 MMHG | OXYGEN SATURATION: 98 % | HEIGHT: 70 IN | BODY MASS INDEX: 28.92 KG/M2 | HEART RATE: 67 BPM | SYSTOLIC BLOOD PRESSURE: 124 MMHG

## 2025-03-27 DIAGNOSIS — F04 WERNICKE-KORSAKOFF SYNDROME: Primary | ICD-10-CM

## 2025-03-27 DIAGNOSIS — F02.B0 MODERATE DEMENTIA ASSOCIATED WITH OTHER UNDERLYING DISEASE, UNSPECIFIED WHETHER BEHAVIORAL, PSYCHOTIC, OR MOOD DISTURBANCE OR ANXIETY: ICD-10-CM

## 2025-03-27 PROCEDURE — 99214 OFFICE O/P EST MOD 30 MIN: CPT | Performed by: PSYCHIATRY & NEUROLOGY

## 2025-03-27 RX ORDER — THIAMINE MONONITRATE (VIT B1) 100 MG
100 TABLET ORAL DAILY
Qty: 90 TABLET | Refills: 1 | Status: SHIPPED | OUTPATIENT
Start: 2025-03-27

## 2025-03-27 RX ORDER — LANOLIN ALCOHOL/MO/W.PET/CERES
1000 CREAM (GRAM) TOPICAL DAILY
Qty: 90 TABLET | Refills: 1 | Status: SHIPPED | OUTPATIENT
Start: 2025-03-27

## 2025-03-27 RX ORDER — ROPINIROLE 1 MG/1
1 TABLET, FILM COATED ORAL NIGHTLY
Qty: 90 TABLET | Refills: 1 | Status: SHIPPED | OUTPATIENT
Start: 2025-03-27

## 2025-03-27 RX ORDER — DONEPEZIL HYDROCHLORIDE 5 MG/1
5 TABLET, FILM COATED ORAL NIGHTLY
Qty: 90 TABLET | Refills: 1 | Status: SHIPPED | OUTPATIENT
Start: 2025-03-27

## 2025-03-27 RX ORDER — MEMANTINE HYDROCHLORIDE 10 MG/1
10 TABLET ORAL 2 TIMES DAILY
Qty: 180 TABLET | Refills: 1 | Status: SHIPPED | OUTPATIENT
Start: 2025-03-27

## 2025-03-27 NOTE — PATIENT INSTRUCTIONS
YOU MUST CONFIRM YOUR APPOINTMENT 1 DAY PRIOR OR IT WILL BE CANCELLED!!   Our office will call you 3 times the day prior to your appointment in an attempt to confirm.  Please return our call ASAP or confirm your appt through [facility] [employer] (P) no later than 3 pm the day before your appointment.  If we do not hear back from you by 3 pm to confirm, your appointment will be cancelled & someone will be added into that slot from our wait list.       EEG PREP:  1. Patient should take seizure medicine, as prescribed, on the day of the test  2. Patient must NOT have more than 5 hours of sleep prior to the EEG  3. Patient should have CLEAN hair, no hairspray, gel, cream rinse, hair pins, or chemical treatments within 48 hours prior to EEG  4. NO caffeine, pain medications or sedatives on the day of the test (TAKE SEIZURE MEDS!)  5. Arrive 30 minutes prior to appointment time (check in at Registration Desk on 1st floor of hospital main entrance - by the [employer] shop)  6. Procedure will last 60-90 minutes.

## 2025-03-27 NOTE — PROGRESS NOTES
Allergies:    Allergies as of 03/27/2025 - Fully Reviewed 03/27/2025   Allergen Reaction Noted    Codeine Nausea Only 09/16/2013    Gabapentin Other (See Comments) 09/04/2023        Social history:     reports that he has been smoking cigarettes. He started smoking about 34 years ago. He has a 17.3 pack-year smoking history. He has never used smokeless tobacco. He reports that he does not currently use alcohol after a past usage of about 3.0 standard drinks of alcohol per week. He reports that he does not currently use drugs after having used the following drugs: Marijuana (Weed).     Family history:    Family History   Problem Relation Age of Onset    Diabetes Mother     Heart Disease Mother     Cancer Sister     Heart Disease Father     Diabetes Brother     Prostate Cancer Maternal Uncle     Stomach Cancer Maternal Grandmother     Cancer Maternal Grandfather         Review of system:  No chest pain, shortness of breath, palpitation, cough, fever, abdominal pain, vomiting, diarrhea, dysuria, vertigo, joint pain, change in speech/vision or new onset of weakness/numbness. Remaining as per HPI.      /78 (BP Site: Left Upper Arm)   Pulse 67   Ht 1.778 m (5' 10\")   Wt 91.6 kg (202 lb)   SpO2 98% Comment: RA  BMI 28.98 kg/m²     Neurological examination:  MENTAL STATUS: AAOx3  LANG/SPEECH: Fluent, intact naming, repetition & comprehension  CRANIAL NERVES:    II: Pupils equal and reactive, no RAPD, normal visual field and fundus    III, IV, VI: EOM intact, no gaze preference or deviation    V: normal    VII: no facial asymmetry    VIII: normal hearing to speech  MOTOR: Generalized weakness 4/5.    REFLEXES: Generalized 1+.  SENSORY: Loss of light touch sensation below knees.  COORD: Positive Romberg test and failed tandem walk.    Short-term memory difficulty.    Imaging, procedure, and laboratory data:   I reviewed the previous brain imagings.    Impression:      ICD-10-CM    1. Wernicke-Korsakoff

## 2025-04-08 ENCOUNTER — HOSPITAL ENCOUNTER (OUTPATIENT)
Dept: NEUROLOGY | Age: 50
Discharge: HOME | End: 2025-04-08
Payer: MEDICARE

## 2025-04-08 DIAGNOSIS — F02.B0 MODERATE DEMENTIA ASSOCIATED WITH OTHER UNDERLYING DISEASE, UNSPECIFIED WHETHER BEHAVIORAL, PSYCHOTIC, OR MOOD DISTURBANCE OR ANXIETY: ICD-10-CM

## 2025-04-08 DIAGNOSIS — F04 WERNICKE-KORSAKOFF SYNDROME: ICD-10-CM

## 2025-04-08 PROCEDURE — 95812 EEG 41-60 MINUTES: CPT | Performed by: PSYCHIATRY & NEUROLOGY

## 2025-04-08 PROCEDURE — 95813 EEG EXTND MNTR 61-119 MIN: CPT

## 2025-04-08 NOTE — PROCEDURES
Electroencephalogram report          Patient: Osvaldo Noble    MR Number: [id number]  YOB: 1975  Date of Visit: 4/8/2025    Clinical History:  The patient is a 49 y.o. years old male with recurrent MS changes and possible seizures. Medications reviewed       Method:  This is one hour digitalized EEG recording. The EEG was performed using the international 10/20 of electrode placements using both referential and bipolar montages. The patient was awake, and drowsy during recording.  Photic stimulation and hyperventilation were performed. Medications reviewed.     Findings:  The background of the EEG showed normal alpha posterior background of 9-10- HZ and amplitude of 20-40 UV. This background was symmetric, waxing and waning, and reactive with eye opening and closure. As the patient became drowsy generalized diffuse slowing was seen through recording at 6-7 HZ.  This generalized slowing was symmetric, non rhythmical, and continuous. No spike or sharp waves were seen. Photic stimulation produced normal posterior driving at higher frequency and hyperventilation did not activate the EEG.    Impression:  This one EEG is normal. No epileptiform discharges, focal and lateralized abnormalities were noted.     Tatiana Baird MD      Board certified in clinical neurophysiology  [facility]  [address] [id number]  Phone: [phone]

## 2025-04-09 ENCOUNTER — TELEPHONE (OUTPATIENT)
Age: 50
End: 2025-04-09

## 2025-04-09 NOTE — TELEPHONE ENCOUNTER
Patient spouse calling on behalf of patient stating he discontinued the donepezil. Pt states this medication caused him to have cold/hot sweats, restless and made him feel really weird. Spouse is asking if there is another medication he can take in place of donepezil.

## 2025-04-11 RX ORDER — GALANTAMINE 4 MG/1
4 TABLET, FILM COATED ORAL 2 TIMES DAILY
Qty: 60 TABLET | Refills: 3 | Status: SHIPPED | OUTPATIENT
Start: 2025-04-11

## 2025-04-11 NOTE — TELEPHONE ENCOUNTER
Spoke with patient spouse whom was informed on both the EEG and medication change. She verbalized understanding.

## 2025-04-11 NOTE — TELEPHONE ENCOUNTER
His EEG is normal.  This is better than the last EEG.  He has Wernicke-Korsakoff syndrome.  This is already called as the secondary dementia.

## 2025-05-23 ENCOUNTER — TRANSCRIBE ORDERS (OUTPATIENT)
Dept: ADMINISTRATIVE | Age: 50
End: 2025-05-23

## 2025-05-23 DIAGNOSIS — R10.9 RIGHT SIDED ABDOMINAL PAIN: Primary | ICD-10-CM

## 2025-05-28 ENCOUNTER — HOSPITAL ENCOUNTER (EMERGENCY)
Age: 50
Discharge: HOME | End: 2025-05-28
Attending: STUDENT IN AN ORGANIZED HEALTH CARE EDUCATION/TRAINING PROGRAM
Payer: MEDICARE

## 2025-05-28 ENCOUNTER — APPOINTMENT (OUTPATIENT)
Dept: ULTRASOUND IMAGING | Age: 50
End: 2025-05-28
Payer: MEDICARE

## 2025-05-28 ENCOUNTER — APPOINTMENT (OUTPATIENT)
Dept: GENERAL RADIOLOGY | Age: 50
End: 2025-05-28
Payer: MEDICARE

## 2025-05-28 VITALS
TEMPERATURE: 98.2 F | SYSTOLIC BLOOD PRESSURE: 123 MMHG | HEART RATE: 79 BPM | BODY MASS INDEX: 32.07 KG/M2 | HEIGHT: 70 IN | OXYGEN SATURATION: 98 % | WEIGHT: 224 LBS | DIASTOLIC BLOOD PRESSURE: 82 MMHG | RESPIRATION RATE: 16 BRPM

## 2025-05-28 DIAGNOSIS — F12.90 MARIJUANA USE: ICD-10-CM

## 2025-05-28 DIAGNOSIS — F41.1 ANXIETY STATE: ICD-10-CM

## 2025-05-28 DIAGNOSIS — R07.9 CHEST PAIN, UNSPECIFIED TYPE: ICD-10-CM

## 2025-05-28 DIAGNOSIS — Z72.0 TOBACCO USE: ICD-10-CM

## 2025-05-28 DIAGNOSIS — R10.9 ABDOMINAL WALL PAIN: Primary | ICD-10-CM

## 2025-05-28 LAB
ALBUMIN SERPL-MCNC: 4 G/DL (ref 3.4–5)
ALBUMIN/GLOB SERPL: 0.9 {RATIO} (ref 1.1–2.2)
ALP SERPL-CCNC: 154 U/L (ref 40–129)
ALT SERPL-CCNC: 32 U/L (ref 10–40)
ANION GAP SERPL CALCULATED.3IONS-SCNC: 16 MMOL/L (ref 3–16)
AST SERPL-CCNC: 19 U/L (ref 15–37)
BASOPHILS # BLD: 0.1 K/UL (ref 0–0.2)
BASOPHILS NFR BLD: 1.2 %
BILIRUB SERPL-MCNC: 0.4 MG/DL (ref 0–1)
BILIRUB UR QL STRIP.AUTO: NEGATIVE
BUN SERPL-MCNC: 13 MG/DL (ref 7–20)
CALCIUM SERPL-MCNC: 9.4 MG/DL (ref 8.3–10.6)
CHLORIDE SERPL-SCNC: 100 MMOL/L (ref 99–110)
CLARITY UR: CLEAR
CO2 SERPL-SCNC: 20 MMOL/L (ref 21–32)
COLOR UR: YELLOW
CREAT SERPL-MCNC: 1.1 MG/DL (ref 0.9–1.3)
DEPRECATED RDW RBC AUTO: 18.8 % (ref 12.4–15.4)
EOSINOPHIL # BLD: 0.1 K/UL (ref 0–0.6)
EOSINOPHIL NFR BLD: 1 %
GFR SERPLBLD CREATININE-BSD FMLA CKD-EPI: 82 ML/MIN/{1.73_M2}
GLUCOSE SERPL-MCNC: 118 MG/DL (ref 70–99)
GLUCOSE UR STRIP.AUTO-MCNC: NEGATIVE MG/DL
HCT VFR BLD AUTO: 46.3 % (ref 40.5–52.5)
HGB BLD-MCNC: 15.1 G/DL (ref 13.5–17.5)
HGB UR QL STRIP.AUTO: NEGATIVE
KETONES UR STRIP.AUTO-MCNC: NEGATIVE MG/DL
LEUKOCYTE ESTERASE UR QL STRIP.AUTO: NEGATIVE
LIPASE SERPL-CCNC: 18 U/L (ref 13–60)
LYMPHOCYTES # BLD: 2.5 K/UL (ref 1–5.1)
LYMPHOCYTES NFR BLD: 25.4 %
MCH RBC QN AUTO: 29 PG (ref 26–34)
MCHC RBC AUTO-ENTMCNC: 32.6 G/DL (ref 31–36)
MCV RBC AUTO: 89 FL (ref 80–100)
MONOCYTES # BLD: 0.9 K/UL (ref 0–1.3)
MONOCYTES NFR BLD: 9.2 %
NEUTROPHILS # BLD: 6.2 K/UL (ref 1.7–7.7)
NEUTROPHILS NFR BLD: 63.2 %
NITRITE UR QL STRIP.AUTO: NEGATIVE
NT-PROBNP SERPL-MCNC: <36 PG/ML (ref 0–124)
PH UR STRIP.AUTO: 7 [PH] (ref 5–8)
PLATELET # BLD AUTO: 301 K/UL (ref 135–450)
PMV BLD AUTO: 9.3 FL (ref 5–10.5)
POTASSIUM SERPL-SCNC: 3.8 MMOL/L (ref 3.5–5.1)
PROT SERPL-MCNC: 8.4 G/DL (ref 6.4–8.2)
PROT UR STRIP.AUTO-MCNC: NEGATIVE MG/DL
RBC # BLD AUTO: 5.2 M/UL (ref 4.2–5.9)
SODIUM SERPL-SCNC: 136 MMOL/L (ref 136–145)
SP GR UR STRIP.AUTO: <=1.005 (ref 1–1.03)
TROPONIN, HIGH SENSITIVITY: 22 NG/L (ref 0–22)
TROPONIN, HIGH SENSITIVITY: 22 NG/L (ref 0–22)
UA COMPLETE W REFLEX CULTURE PNL UR: NORMAL
UA DIPSTICK W REFLEX MICRO PNL UR: NORMAL
URN SPEC COLLECT METH UR: NORMAL
UROBILINOGEN UR STRIP-ACNC: 0.2 E.U./DL
WBC # BLD AUTO: 9.9 K/UL (ref 4–11)

## 2025-05-28 PROCEDURE — 80053 COMPREHEN METABOLIC PANEL: CPT

## 2025-05-28 PROCEDURE — 93005 ELECTROCARDIOGRAM TRACING: CPT | Performed by: STUDENT IN AN ORGANIZED HEALTH CARE EDUCATION/TRAINING PROGRAM

## 2025-05-28 PROCEDURE — 81003 URINALYSIS AUTO W/O SCOPE: CPT

## 2025-05-28 PROCEDURE — 71046 X-RAY EXAM CHEST 2 VIEWS: CPT

## 2025-05-28 PROCEDURE — 83690 ASSAY OF LIPASE: CPT

## 2025-05-28 PROCEDURE — 36415 COLL VENOUS BLD VENIPUNCTURE: CPT

## 2025-05-28 PROCEDURE — 76705 ECHO EXAM OF ABDOMEN: CPT

## 2025-05-28 PROCEDURE — 85025 COMPLETE CBC W/AUTO DIFF WBC: CPT

## 2025-05-28 PROCEDURE — 83880 ASSAY OF NATRIURETIC PEPTIDE: CPT

## 2025-05-28 PROCEDURE — 84484 ASSAY OF TROPONIN QUANT: CPT

## 2025-05-28 PROCEDURE — 99285 EMERGENCY DEPT VISIT HI MDM: CPT

## 2025-05-28 PROCEDURE — 6370000000 HC RX 637 (ALT 250 FOR IP): Performed by: PHYSICIAN ASSISTANT

## 2025-05-28 RX ORDER — LORAZEPAM 0.5 MG/1
0.5 TABLET ORAL ONCE
Status: COMPLETED | OUTPATIENT
Start: 2025-05-28 | End: 2025-05-28

## 2025-05-28 RX ADMIN — LORAZEPAM 0.5 MG: 0.5 TABLET ORAL at 14:49

## 2025-05-28 ASSESSMENT — PAIN DESCRIPTION - DESCRIPTORS: DESCRIPTORS: HEAVINESS

## 2025-05-28 ASSESSMENT — PAIN SCALES - GENERAL: PAINLEVEL_OUTOF10: 8

## 2025-05-28 ASSESSMENT — PAIN DESCRIPTION - LOCATION: LOCATION: CHEST

## 2025-05-28 ASSESSMENT — PAIN DESCRIPTION - PAIN TYPE: TYPE: ACUTE PAIN

## 2025-05-28 ASSESSMENT — PAIN - FUNCTIONAL ASSESSMENT: PAIN_FUNCTIONAL_ASSESSMENT: 0-10

## 2025-05-28 ASSESSMENT — HEART SCORE: ECG: NORMAL

## 2025-05-28 NOTE — ED PROVIDER NOTES
[facility] EMERGENCY DEPARTMENT  EMERGENCY DEPARTMENT ENCOUNTER        Pt Name: Osvaldo Noble  MRN: [id number]  Birthdate 1975  Date of evaluation: 5/28/2025  Provider: Angelica Newman PA-C  PCP: Leo Howard DO  Note Started: 4:27 PM EDT 5/28/25       I have seen and evaluated this patient with my supervising physician John Triplett MD.      CHIEF COMPLAINT       Chief Complaint   Patient presents with    Abdominal Pain     Patient states that he is having abdominal pain for a few weeks, has an order for a gallbladder US. States that he ate pizza rolls and has been vomiting today    Chest Pain     States his chest is heavy, being treated for bronchitis       HISTORY OF PRESENT ILLNESS: 1 or more Elements     History From: Patient            Chief Complaint: Abdominal pain, chest pain    Osvaldo Noble is a 49 y.o. male who presents to the emergency department as he has had increased left-sided sharp chest pain, shortness of breath and right upper quadrant abdominal pain, sharp over the past months.  He states that his symptoms are intermittent.  He does endorse nausea vomiting overnight after eating pizza rolls yesterday.  He states when he eats it does not necessarily cause pain but it does increase his nausea.  He also endorses history of tobacco use.  Trying to decrease his use but has increased anxiety and states he cannot do this until he gets on a anxiety medication.  Denies any fevers or chills.  Denies any dysuria, hematuria, hematemesis, melena, hematochezia.    He has been seen at [facility] for this twice, [place] x 1 and was discharge each time.    Nursing Notes were all reviewed and agreed with or any disagreements were addressed in the HPI.    REVIEW OF SYSTEMS :      Review of Systems   All other systems reviewed and are negative.      Positives and Pertinent negatives as per HPI.     SURGICAL HISTORY     Past Surgical History:   Procedure Laterality Date    CERVICAL  0.4 MG SL TABLET    Place 1 tablet under the tongue every 5 minutes as needed for Chest pain up to max of 3 total doses. If no relief after 1 dose, call 911.    OXYCODONE HCL 7.5 MG TABS    Take 7.5 mg by mouth every 6 hours as needed    PANTOPRAZOLE (PROTONIX) 40 MG TABLET    Take 1 tablet by mouth every morning (before breakfast)    PRAVASTATIN (PRAVACHOL) 20 MG TABLET    Take 2 tablets by mouth at bedtime    ROPINIROLE (REQUIP) 1 MG TABLET    Take 1 tablet by mouth nightly    TAMSULOSIN (FLOMAX) 0.4 MG CAPSULE    Take 1 capsule by mouth daily    TIOTROPIUM-OLODATEROL (STIOLTO) 2.5-2.5 MCG/ACT AERS    Inhale 2 puffs into the lungs daily    TRAZODONE (DESYREL) 100 MG TABLET    Take 2 tablets by mouth nightly    VENLAFAXINE (EFFEXOR XR) 150 MG EXTENDED RELEASE CAPSULE    Take 1 capsule by mouth daily    VITAMIN B-1 (THIAMINE) 100 MG TABLET    Take 1 tablet by mouth daily    VITAMIN B-12 (CYANOCOBALAMIN) 1000 MCG TABLET    Take 1 tablet by mouth daily       ALLERGIES     Codeine, Gabapentin, and Morphine    FAMILYHISTORY       Family History   Problem Relation Age of Onset    Diabetes Mother     Heart Disease Mother     Cancer Sister     Heart Disease Father     Diabetes Brother     Prostate Cancer Maternal Uncle     Stomach Cancer Maternal Grandmother     Cancer Maternal Grandfather         SOCIAL HISTORY       Social History     Tobacco Use    Smoking status: Every Day     Current packs/day: 0.50     Average packs/day: 0.5 packs/day for 34.7 years (17.4 ttl pk-yrs)     Types: Cigarettes     Start date: 9/9/1990    Smokeless tobacco: Never    Tobacco comments:     5/27/2021- smokes 1 pk daily   Vaping Use    Vaping status: Never Used   Substance Use Topics    Alcohol use: Not Currently     Alcohol/week: 3.0 standard drinks of alcohol     Types: 3 Cans of beer per week     Comment: socially    Drug use: Not Currently     Types: Marijuana (Weed)       SCREENINGS     NIHSS:     GCS:      Heart Score  Heart Score for

## 2025-05-28 NOTE — ED PROVIDER NOTES
I have reviewed the below EKG. I was not otherwise involved in this patient's care.      EKG  The Ekg interpreted by myself  normal sinus rhythm with a rate of 71  Axis is   Normal  QTc is  normal  LAFB  No specific ST-T wave changes appreciated.  No evidence of acute ischemia.           Stephani Walters MD  05/28/25 1499

## 2025-05-29 LAB
EKG ATRIAL RATE: 71 BPM
EKG DIAGNOSIS: NORMAL
EKG P AXIS: 63 DEGREES
EKG P-R INTERVAL: 168 MS
EKG Q-T INTERVAL: 418 MS
EKG QRS DURATION: 94 MS
EKG QTC CALCULATION (BAZETT): 454 MS
EKG R AXIS: -50 DEGREES
EKG T AXIS: 62 DEGREES
EKG VENTRICULAR RATE: 71 BPM

## 2025-05-29 PROCEDURE — 93010 ELECTROCARDIOGRAM REPORT: CPT | Performed by: INTERNAL MEDICINE

## 2025-06-04 ENCOUNTER — OFFICE VISIT (OUTPATIENT)
Dept: PULMONOLOGY | Age: 50
End: 2025-06-04
Payer: MEDICARE

## 2025-06-04 VITALS
HEIGHT: 70 IN | DIASTOLIC BLOOD PRESSURE: 70 MMHG | WEIGHT: 215 LBS | OXYGEN SATURATION: 96 % | SYSTOLIC BLOOD PRESSURE: 128 MMHG | HEART RATE: 68 BPM | RESPIRATION RATE: 17 BRPM | BODY MASS INDEX: 30.78 KG/M2

## 2025-06-04 DIAGNOSIS — J84.9 ILD (INTERSTITIAL LUNG DISEASE) (HCC): Primary | ICD-10-CM

## 2025-06-04 PROCEDURE — 99214 OFFICE O/P EST MOD 30 MIN: CPT | Performed by: INTERNAL MEDICINE

## 2025-06-19 ENCOUNTER — HOSPITAL ENCOUNTER (OUTPATIENT)
Dept: CT IMAGING | Age: 50
Discharge: HOME | End: 2025-06-19
Payer: MEDICARE

## 2025-06-19 DIAGNOSIS — J84.9 ILD (INTERSTITIAL LUNG DISEASE) (HCC): ICD-10-CM

## 2025-06-19 PROCEDURE — 71250 CT THORAX DX C-: CPT

## 2025-06-27 ENCOUNTER — RESULTS FOLLOW-UP (OUTPATIENT)
Dept: PULMONOLOGY | Age: 50
End: 2025-06-27

## 2025-07-28 ENCOUNTER — APPOINTMENT (OUTPATIENT)
Dept: GENERAL RADIOLOGY | Age: 50
DRG: 101 | End: 2025-07-28
Payer: MEDICARE

## 2025-07-28 ENCOUNTER — APPOINTMENT (OUTPATIENT)
Dept: CT IMAGING | Age: 50
DRG: 101 | End: 2025-07-28
Payer: MEDICARE

## 2025-07-28 ENCOUNTER — HOSPITAL ENCOUNTER (INPATIENT)
Age: 50
LOS: 1 days | Discharge: HOME | DRG: 101 | End: 2025-07-29
Attending: EMERGENCY MEDICINE | Admitting: INTERNAL MEDICINE
Payer: MEDICARE

## 2025-07-28 DIAGNOSIS — R53.1 GENERAL WEAKNESS: ICD-10-CM

## 2025-07-28 DIAGNOSIS — R56.9 CONVULSIONS, UNSPECIFIED CONVULSION TYPE (HCC): Primary | ICD-10-CM

## 2025-07-28 DIAGNOSIS — R20.2 NUMBNESS AND TINGLING: ICD-10-CM

## 2025-07-28 DIAGNOSIS — R29.898 WEAKNESS OF BOTH HANDS: ICD-10-CM

## 2025-07-28 DIAGNOSIS — R20.0 NUMBNESS AND TINGLING: ICD-10-CM

## 2025-07-28 PROBLEM — G25.81 RLS (RESTLESS LEGS SYNDROME): Status: ACTIVE | Noted: 2025-07-28

## 2025-07-28 LAB
ALBUMIN SERPL-MCNC: 3.4 G/DL (ref 3.4–5)
ALBUMIN/GLOB SERPL: 1.4 {RATIO} (ref 1.1–2.2)
ALP SERPL-CCNC: 101 U/L (ref 40–129)
ALT SERPL-CCNC: 14 U/L (ref 10–40)
AMMONIA PLAS-SCNC: 20 UMOL/L (ref 16–60)
AMPHETAMINES UR QL SCN>1000 NG/ML: ABNORMAL
ANION GAP SERPL CALCULATED.3IONS-SCNC: 12 MMOL/L (ref 3–16)
APAP SERPL-MCNC: <5 UG/ML (ref 10–30)
AST SERPL-CCNC: 19 U/L (ref 15–37)
BARBITURATES UR QL SCN>200 NG/ML: ABNORMAL
BASOPHILS # BLD: 0.1 K/UL (ref 0–0.2)
BASOPHILS NFR BLD: 0.7 %
BENZODIAZ UR QL SCN>200 NG/ML: ABNORMAL
BILIRUB SERPL-MCNC: 0.3 MG/DL (ref 0–1)
BILIRUB UR QL STRIP.AUTO: NEGATIVE
BUN SERPL-MCNC: 15 MG/DL (ref 7–20)
CALCIUM SERPL-MCNC: 9.1 MG/DL (ref 8.3–10.6)
CANNABINOIDS UR QL SCN>50 NG/ML: POSITIVE
CHLORIDE SERPL-SCNC: 107 MMOL/L (ref 99–110)
CK SERPL-CCNC: 250 U/L (ref 39–308)
CLARITY UR: CLEAR
CO2 SERPL-SCNC: 22 MMOL/L (ref 21–32)
COCAINE UR QL SCN: ABNORMAL
COLOR UR: YELLOW
CREAT SERPL-MCNC: 1.3 MG/DL (ref 0.9–1.3)
DEPRECATED RDW RBC AUTO: 17.7 % (ref 12.4–15.4)
DRUG SCREEN COMMENT UR-IMP: ABNORMAL
EKG ATRIAL RATE: 56 BPM
EKG DIAGNOSIS: NORMAL
EKG P AXIS: 43 DEGREES
EKG P-R INTERVAL: 178 MS
EKG Q-T INTERVAL: 416 MS
EKG QRS DURATION: 96 MS
EKG QTC CALCULATION (BAZETT): 401 MS
EKG R AXIS: -17 DEGREES
EKG T AXIS: 52 DEGREES
EKG VENTRICULAR RATE: 56 BPM
EOSINOPHIL # BLD: 0.1 K/UL (ref 0–0.6)
EOSINOPHIL NFR BLD: 1 %
ETHANOLAMINE SERPL-MCNC: NORMAL MG/DL (ref 0–0.08)
FENTANYL SCREEN, URINE: ABNORMAL
GFR SERPLBLD CREATININE-BSD FMLA CKD-EPI: 67 ML/MIN/{1.73_M2}
GLUCOSE SERPL-MCNC: 118 MG/DL (ref 70–99)
GLUCOSE UR STRIP.AUTO-MCNC: NEGATIVE MG/DL
HCT VFR BLD AUTO: 36.9 % (ref 40.5–52.5)
HGB BLD-MCNC: 12.3 G/DL (ref 13.5–17.5)
HGB UR QL STRIP.AUTO: NEGATIVE
KETONES UR STRIP.AUTO-MCNC: NEGATIVE MG/DL
LACTATE BLDV-SCNC: 0.8 MMOL/L (ref 0.4–2)
LEUKOCYTE ESTERASE UR QL STRIP.AUTO: NEGATIVE
LYMPHOCYTES # BLD: 1.4 K/UL (ref 1–5.1)
LYMPHOCYTES NFR BLD: 14.4 %
MCH RBC QN AUTO: 29.6 PG (ref 26–34)
MCHC RBC AUTO-ENTMCNC: 33.3 G/DL (ref 31–36)
MCV RBC AUTO: 88.6 FL (ref 80–100)
METHADONE UR QL SCN>300 NG/ML: ABNORMAL
MONOCYTES # BLD: 0.8 K/UL (ref 0–1.3)
MONOCYTES NFR BLD: 8.2 %
NEUTROPHILS # BLD: 7.5 K/UL (ref 1.7–7.7)
NEUTROPHILS NFR BLD: 75.7 %
NITRITE UR QL STRIP.AUTO: NEGATIVE
OPIATES UR QL SCN>300 NG/ML: ABNORMAL
OXYCODONE UR QL SCN: ABNORMAL
PCP UR QL SCN>25 NG/ML: ABNORMAL
PH UR STRIP.AUTO: 6 [PH] (ref 5–8)
PH UR STRIP: 6 [PH]
PLATELET # BLD AUTO: 258 K/UL (ref 135–450)
PMV BLD AUTO: 9 FL (ref 5–10.5)
POTASSIUM SERPL-SCNC: 4 MMOL/L (ref 3.5–5.1)
PROT SERPL-MCNC: 5.9 G/DL (ref 6.4–8.2)
PROT UR STRIP.AUTO-MCNC: NEGATIVE MG/DL
RBC # BLD AUTO: 4.16 M/UL (ref 4.2–5.9)
SALICYLATES SERPL-MCNC: <0.5 MG/DL (ref 15–30)
SODIUM SERPL-SCNC: 141 MMOL/L (ref 136–145)
SP GR UR STRIP.AUTO: 1.01 (ref 1–1.03)
UA COMPLETE W REFLEX CULTURE PNL UR: NORMAL
UA DIPSTICK W REFLEX MICRO PNL UR: NORMAL
URN SPEC COLLECT METH UR: NORMAL
UROBILINOGEN UR STRIP-ACNC: 0.2 E.U./DL
WBC # BLD AUTO: 9.9 K/UL (ref 4–11)

## 2025-07-28 PROCEDURE — 99223 1ST HOSP IP/OBS HIGH 75: CPT

## 2025-07-28 PROCEDURE — 82550 ASSAY OF CK (CPK): CPT

## 2025-07-28 PROCEDURE — 71045 X-RAY EXAM CHEST 1 VIEW: CPT

## 2025-07-28 PROCEDURE — 72125 CT NECK SPINE W/O DYE: CPT

## 2025-07-28 PROCEDURE — 82077 ASSAY SPEC XCP UR&BREATH IA: CPT

## 2025-07-28 PROCEDURE — 81003 URINALYSIS AUTO W/O SCOPE: CPT

## 2025-07-28 PROCEDURE — 6370000000 HC RX 637 (ALT 250 FOR IP)

## 2025-07-28 PROCEDURE — 2580000003 HC RX 258: Performed by: EMERGENCY MEDICINE

## 2025-07-28 PROCEDURE — 82607 VITAMIN B-12: CPT

## 2025-07-28 PROCEDURE — G0378 HOSPITAL OBSERVATION PER HR: HCPCS

## 2025-07-28 PROCEDURE — 93010 ELECTROCARDIOGRAM REPORT: CPT | Performed by: STUDENT IN AN ORGANIZED HEALTH CARE EDUCATION/TRAINING PROGRAM

## 2025-07-28 PROCEDURE — 72170 X-RAY EXAM OF PELVIS: CPT

## 2025-07-28 PROCEDURE — 80179 DRUG ASSAY SALICYLATE: CPT

## 2025-07-28 PROCEDURE — 1200000000 HC SEMI PRIVATE

## 2025-07-28 PROCEDURE — 83605 ASSAY OF LACTIC ACID: CPT

## 2025-07-28 PROCEDURE — 96374 THER/PROPH/DIAG INJ IV PUSH: CPT

## 2025-07-28 PROCEDURE — 36415 COLL VENOUS BLD VENIPUNCTURE: CPT

## 2025-07-28 PROCEDURE — 96372 THER/PROPH/DIAG INJ SC/IM: CPT

## 2025-07-28 PROCEDURE — 6360000002 HC RX W HCPCS: Performed by: STUDENT IN AN ORGANIZED HEALTH CARE EDUCATION/TRAINING PROGRAM

## 2025-07-28 PROCEDURE — 82746 ASSAY OF FOLIC ACID SERUM: CPT

## 2025-07-28 PROCEDURE — 93005 ELECTROCARDIOGRAM TRACING: CPT | Performed by: EMERGENCY MEDICINE

## 2025-07-28 PROCEDURE — 82140 ASSAY OF AMMONIA: CPT

## 2025-07-28 PROCEDURE — 70450 CT HEAD/BRAIN W/O DYE: CPT

## 2025-07-28 PROCEDURE — 80143 DRUG ASSAY ACETAMINOPHEN: CPT

## 2025-07-28 PROCEDURE — 80053 COMPREHEN METABOLIC PANEL: CPT

## 2025-07-28 PROCEDURE — 6360000002 HC RX W HCPCS

## 2025-07-28 PROCEDURE — 80307 DRUG TEST PRSMV CHEM ANLYZR: CPT

## 2025-07-28 PROCEDURE — 6370000000 HC RX 637 (ALT 250 FOR IP): Performed by: EMERGENCY MEDICINE

## 2025-07-28 PROCEDURE — 2500000003 HC RX 250 WO HCPCS

## 2025-07-28 PROCEDURE — 99285 EMERGENCY DEPT VISIT HI MDM: CPT

## 2025-07-28 PROCEDURE — 85025 COMPLETE CBC W/AUTO DIFF WBC: CPT

## 2025-07-28 RX ORDER — DIAZEPAM 5 MG/1
20 TABLET ORAL
Status: CANCELLED | OUTPATIENT
Start: 2025-07-28

## 2025-07-28 RX ORDER — DIAZEPAM 10 MG/2ML
5 INJECTION, SOLUTION INTRAMUSCULAR; INTRAVENOUS
Status: CANCELLED | OUTPATIENT
Start: 2025-07-28

## 2025-07-28 RX ORDER — ACETAMINOPHEN 650 MG/1
650 SUPPOSITORY RECTAL EVERY 6 HOURS PRN
Status: DISCONTINUED | OUTPATIENT
Start: 2025-07-28 | End: 2025-07-29

## 2025-07-28 RX ORDER — DIAZEPAM 5 MG/1
15 TABLET ORAL
Status: CANCELLED | OUTPATIENT
Start: 2025-07-28

## 2025-07-28 RX ORDER — LEVOTHYROXINE SODIUM 50 UG/1
50 TABLET ORAL DAILY
Status: DISCONTINUED | OUTPATIENT
Start: 2025-07-29 | End: 2025-07-29 | Stop reason: HOSPADM

## 2025-07-28 RX ORDER — DIAZEPAM 10 MG/2ML
10 INJECTION, SOLUTION INTRAMUSCULAR; INTRAVENOUS
Status: CANCELLED | OUTPATIENT
Start: 2025-07-28

## 2025-07-28 RX ORDER — DIAZEPAM 10 MG/2ML
5 INJECTION, SOLUTION INTRAMUSCULAR; INTRAVENOUS EVERY 4 HOURS PRN
Status: DISCONTINUED | OUTPATIENT
Start: 2025-07-28 | End: 2025-07-29 | Stop reason: HOSPADM

## 2025-07-28 RX ORDER — DIAZEPAM 5 MG/1
10 TABLET ORAL
Status: CANCELLED | OUTPATIENT
Start: 2025-07-28

## 2025-07-28 RX ORDER — CLOPIDOGREL BISULFATE 75 MG/1
75 TABLET ORAL DAILY
Status: DISCONTINUED | OUTPATIENT
Start: 2025-07-29 | End: 2025-07-29 | Stop reason: HOSPADM

## 2025-07-28 RX ORDER — DIAZEPAM 10 MG/2ML
15 INJECTION, SOLUTION INTRAMUSCULAR; INTRAVENOUS
Status: CANCELLED | OUTPATIENT
Start: 2025-07-28

## 2025-07-28 RX ORDER — AMLODIPINE BESYLATE 2.5 MG/1
2.5 TABLET ORAL DAILY
Status: DISCONTINUED | OUTPATIENT
Start: 2025-07-29 | End: 2025-07-29 | Stop reason: HOSPADM

## 2025-07-28 RX ORDER — ACETAMINOPHEN 325 MG/1
650 TABLET ORAL EVERY 6 HOURS PRN
Status: DISCONTINUED | OUTPATIENT
Start: 2025-07-28 | End: 2025-07-29

## 2025-07-28 RX ORDER — BETHANECHOL CHLORIDE 25 MG/1
25 TABLET ORAL 2 TIMES DAILY
Status: DISCONTINUED | OUTPATIENT
Start: 2025-07-28 | End: 2025-07-29 | Stop reason: HOSPADM

## 2025-07-28 RX ORDER — ALBUTEROL SULFATE 90 UG/1
1 INHALANT RESPIRATORY (INHALATION) EVERY 6 HOURS PRN
Status: DISCONTINUED | OUTPATIENT
Start: 2025-07-28 | End: 2025-07-29 | Stop reason: HOSPADM

## 2025-07-28 RX ORDER — POTASSIUM CHLORIDE 1500 MG/1
40 TABLET, EXTENDED RELEASE ORAL PRN
Status: DISCONTINUED | OUTPATIENT
Start: 2025-07-28 | End: 2025-07-29 | Stop reason: HOSPADM

## 2025-07-28 RX ORDER — SODIUM CHLORIDE 0.9 % (FLUSH) 0.9 %
5-40 SYRINGE (ML) INJECTION EVERY 12 HOURS SCHEDULED
Status: DISCONTINUED | OUTPATIENT
Start: 2025-07-28 | End: 2025-07-29 | Stop reason: HOSPADM

## 2025-07-28 RX ORDER — FUROSEMIDE 20 MG/1
20 TABLET ORAL
Status: DISCONTINUED | OUTPATIENT
Start: 2025-07-29 | End: 2025-07-29 | Stop reason: HOSPADM

## 2025-07-28 RX ORDER — ATORVASTATIN CALCIUM 80 MG/1
80 TABLET, FILM COATED ORAL DAILY
COMMUNITY
Start: 2025-06-13

## 2025-07-28 RX ORDER — METHOCARBAMOL 500 MG/1
1000 TABLET, FILM COATED ORAL 4 TIMES DAILY
COMMUNITY

## 2025-07-28 RX ORDER — PANTOPRAZOLE SODIUM 40 MG/1
40 TABLET, DELAYED RELEASE ORAL
Status: DISCONTINUED | OUTPATIENT
Start: 2025-07-29 | End: 2025-07-29 | Stop reason: HOSPADM

## 2025-07-28 RX ORDER — NYSTATIN 100000 [USP'U]/ML
5 SUSPENSION ORAL 4 TIMES DAILY
Status: DISCONTINUED | OUTPATIENT
Start: 2025-07-28 | End: 2025-07-29 | Stop reason: HOSPADM

## 2025-07-28 RX ORDER — SODIUM CHLORIDE 9 MG/ML
INJECTION, SOLUTION INTRAVENOUS PRN
Status: DISCONTINUED | OUTPATIENT
Start: 2025-07-28 | End: 2025-07-29 | Stop reason: HOSPADM

## 2025-07-28 RX ORDER — GALANTAMINE 4 MG/1
4 TABLET, FILM COATED ORAL 2 TIMES DAILY
Status: DISCONTINUED | OUTPATIENT
Start: 2025-07-28 | End: 2025-07-29 | Stop reason: HOSPADM

## 2025-07-28 RX ORDER — LEVOTHYROXINE SODIUM 50 UG/1
50 TABLET ORAL DAILY
COMMUNITY
Start: 2025-07-16

## 2025-07-28 RX ORDER — FOLIC ACID 1 MG/1
1 TABLET ORAL DAILY
Status: DISCONTINUED | OUTPATIENT
Start: 2025-07-28 | End: 2025-07-29 | Stop reason: HOSPADM

## 2025-07-28 RX ORDER — POTASSIUM CHLORIDE 7.45 MG/ML
10 INJECTION INTRAVENOUS PRN
Status: DISCONTINUED | OUTPATIENT
Start: 2025-07-28 | End: 2025-07-29 | Stop reason: HOSPADM

## 2025-07-28 RX ORDER — GABAPENTIN 400 MG/1
400 CAPSULE ORAL 3 TIMES DAILY
Status: DISCONTINUED | OUTPATIENT
Start: 2025-07-28 | End: 2025-07-29 | Stop reason: HOSPADM

## 2025-07-28 RX ORDER — LANOLIN ALCOHOL/MO/W.PET/CERES
100 CREAM (GRAM) TOPICAL DAILY
Status: DISCONTINUED | OUTPATIENT
Start: 2025-07-28 | End: 2025-07-29 | Stop reason: HOSPADM

## 2025-07-28 RX ORDER — VENLAFAXINE HYDROCHLORIDE 75 MG/1
150 CAPSULE, EXTENDED RELEASE ORAL DAILY
Status: DISCONTINUED | OUTPATIENT
Start: 2025-07-29 | End: 2025-07-29 | Stop reason: HOSPADM

## 2025-07-28 RX ORDER — TAMSULOSIN HYDROCHLORIDE 0.4 MG/1
0.4 CAPSULE ORAL 2 TIMES DAILY
Status: DISCONTINUED | OUTPATIENT
Start: 2025-07-28 | End: 2025-07-29 | Stop reason: HOSPADM

## 2025-07-28 RX ORDER — ONDANSETRON 2 MG/ML
4 INJECTION INTRAMUSCULAR; INTRAVENOUS EVERY 6 HOURS PRN
Status: DISCONTINUED | OUTPATIENT
Start: 2025-07-28 | End: 2025-07-29 | Stop reason: HOSPADM

## 2025-07-28 RX ORDER — DIAZEPAM 10 MG/2ML
20 INJECTION, SOLUTION INTRAMUSCULAR; INTRAVENOUS
Status: CANCELLED | OUTPATIENT
Start: 2025-07-28

## 2025-07-28 RX ORDER — MAGNESIUM SULFATE IN WATER 40 MG/ML
2000 INJECTION, SOLUTION INTRAVENOUS PRN
Status: DISCONTINUED | OUTPATIENT
Start: 2025-07-28 | End: 2025-07-29 | Stop reason: HOSPADM

## 2025-07-28 RX ORDER — ATORVASTATIN CALCIUM 40 MG/1
80 TABLET, FILM COATED ORAL DAILY
Status: DISCONTINUED | OUTPATIENT
Start: 2025-07-29 | End: 2025-07-29 | Stop reason: HOSPADM

## 2025-07-28 RX ORDER — ENOXAPARIN SODIUM 100 MG/ML
40 INJECTION SUBCUTANEOUS DAILY
Status: DISCONTINUED | OUTPATIENT
Start: 2025-07-28 | End: 2025-07-29 | Stop reason: HOSPADM

## 2025-07-28 RX ORDER — MULTIVITAMIN WITH IRON
1 TABLET ORAL DAILY
Status: DISCONTINUED | OUTPATIENT
Start: 2025-07-28 | End: 2025-07-29 | Stop reason: HOSPADM

## 2025-07-28 RX ORDER — MULTIVITAMIN WITH IRON
1000 TABLET ORAL DAILY
Status: DISCONTINUED | OUTPATIENT
Start: 2025-07-29 | End: 2025-07-29 | Stop reason: HOSPADM

## 2025-07-28 RX ORDER — 0.9 % SODIUM CHLORIDE 0.9 %
1000 INTRAVENOUS SOLUTION INTRAVENOUS ONCE
Status: COMPLETED | OUTPATIENT
Start: 2025-07-28 | End: 2025-07-28

## 2025-07-28 RX ORDER — NICOTINE 21 MG/24HR
1 PATCH, TRANSDERMAL 24 HOURS TRANSDERMAL DAILY PRN
Status: DISCONTINUED | OUTPATIENT
Start: 2025-07-28 | End: 2025-07-29 | Stop reason: HOSPADM

## 2025-07-28 RX ORDER — FUROSEMIDE 20 MG/1
20 TABLET ORAL 2 TIMES DAILY
COMMUNITY
Start: 2025-07-17

## 2025-07-28 RX ORDER — DIAZEPAM 5 MG/1
5 TABLET ORAL
Status: CANCELLED | OUTPATIENT
Start: 2025-07-28

## 2025-07-28 RX ORDER — DOCUSATE SODIUM 100 MG/1
100 CAPSULE, LIQUID FILLED ORAL 2 TIMES DAILY
Status: DISCONTINUED | OUTPATIENT
Start: 2025-07-28 | End: 2025-07-29 | Stop reason: HOSPADM

## 2025-07-28 RX ORDER — SODIUM CHLORIDE 0.9 % (FLUSH) 0.9 %
5-40 SYRINGE (ML) INJECTION PRN
Status: DISCONTINUED | OUTPATIENT
Start: 2025-07-28 | End: 2025-07-29 | Stop reason: HOSPADM

## 2025-07-28 RX ORDER — MEMANTINE HYDROCHLORIDE 5 MG/1
10 TABLET ORAL 2 TIMES DAILY
Status: DISCONTINUED | OUTPATIENT
Start: 2025-07-28 | End: 2025-07-29 | Stop reason: HOSPADM

## 2025-07-28 RX ORDER — ONDANSETRON 4 MG/1
4 TABLET, ORALLY DISINTEGRATING ORAL EVERY 8 HOURS PRN
Status: DISCONTINUED | OUTPATIENT
Start: 2025-07-28 | End: 2025-07-29 | Stop reason: HOSPADM

## 2025-07-28 RX ORDER — ALLOPURINOL 300 MG/1
600 TABLET ORAL DAILY
Status: DISCONTINUED | OUTPATIENT
Start: 2025-07-29 | End: 2025-07-29 | Stop reason: HOSPADM

## 2025-07-28 RX ORDER — BACLOFEN 10 MG/1
10 TABLET ORAL 3 TIMES DAILY
Status: DISCONTINUED | OUTPATIENT
Start: 2025-07-28 | End: 2025-07-29 | Stop reason: HOSPADM

## 2025-07-28 RX ORDER — ROPINIROLE 1 MG/1
1 TABLET, FILM COATED ORAL NIGHTLY
Status: DISCONTINUED | OUTPATIENT
Start: 2025-07-29 | End: 2025-07-29 | Stop reason: HOSPADM

## 2025-07-28 RX ORDER — KETOROLAC TROMETHAMINE 30 MG/ML
30 INJECTION, SOLUTION INTRAMUSCULAR; INTRAVENOUS EVERY 6 HOURS PRN
Status: DISCONTINUED | OUTPATIENT
Start: 2025-07-28 | End: 2025-07-29 | Stop reason: HOSPADM

## 2025-07-28 RX ORDER — POLYETHYLENE GLYCOL 3350 17 G/17G
17 POWDER, FOR SOLUTION ORAL DAILY PRN
Status: DISCONTINUED | OUTPATIENT
Start: 2025-07-28 | End: 2025-07-29 | Stop reason: HOSPADM

## 2025-07-28 RX ADMIN — Medication 10 ML: at 20:16

## 2025-07-28 RX ADMIN — ENOXAPARIN SODIUM 40 MG: 100 INJECTION SUBCUTANEOUS at 18:58

## 2025-07-28 RX ADMIN — MEMANTINE HYDROCHLORIDE 10 MG: 5 TABLET ORAL at 20:14

## 2025-07-28 RX ADMIN — SODIUM CHLORIDE 1000 ML: 0.9 INJECTION, SOLUTION INTRAVENOUS at 12:30

## 2025-07-28 RX ADMIN — NYSTATIN 500000 UNITS: 100000 SUSPENSION ORAL at 20:14

## 2025-07-28 RX ADMIN — KETOROLAC TROMETHAMINE 30 MG: 30 INJECTION, SOLUTION INTRAMUSCULAR at 23:06

## 2025-07-28 RX ADMIN — DOCUSATE SODIUM 100 MG: 100 CAPSULE, LIQUID FILLED ORAL at 20:14

## 2025-07-28 RX ADMIN — THERA TABS 1 TABLET: TAB at 13:02

## 2025-07-28 RX ADMIN — GABAPENTIN 400 MG: 400 CAPSULE ORAL at 20:14

## 2025-07-28 RX ADMIN — FOLIC ACID 1 MG: 1 TABLET ORAL at 13:02

## 2025-07-28 RX ADMIN — BACLOFEN 10 MG: 10 TABLET ORAL at 20:14

## 2025-07-28 RX ADMIN — Medication 100 MG: at 13:02

## 2025-07-28 ASSESSMENT — PAIN DESCRIPTION - LOCATION
LOCATION: BACK;KNEE
LOCATION: BACK;NECK
LOCATION: BACK
LOCATION: BACK

## 2025-07-28 ASSESSMENT — PAIN SCALES - GENERAL
PAINLEVEL_OUTOF10: 0
PAINLEVEL_OUTOF10: 8
PAINLEVEL_OUTOF10: 10

## 2025-07-28 ASSESSMENT — PAIN - FUNCTIONAL ASSESSMENT
PAIN_FUNCTIONAL_ASSESSMENT: ACTIVITIES ARE NOT PREVENTED
PAIN_FUNCTIONAL_ASSESSMENT: PREVENTS OR INTERFERES SOME ACTIVE ACTIVITIES AND ADLS
PAIN_FUNCTIONAL_ASSESSMENT: ACTIVITIES ARE NOT PREVENTED
PAIN_FUNCTIONAL_ASSESSMENT: 0-10

## 2025-07-28 ASSESSMENT — ENCOUNTER SYMPTOMS
VOICE CHANGE: 0
NAUSEA: 0
STRIDOR: 0
COLOR CHANGE: 0
WHEEZING: 0
ABDOMINAL PAIN: 0
FACIAL SWELLING: 0
PHOTOPHOBIA: 0
BLOOD IN STOOL: 0
SHORTNESS OF BREATH: 0
TROUBLE SWALLOWING: 0
VOMITING: 0
BACK PAIN: 0

## 2025-07-28 ASSESSMENT — PAIN DESCRIPTION - DESCRIPTORS
DESCRIPTORS: ACHING;THROBBING
DESCRIPTORS: STABBING;SHOOTING
DESCRIPTORS: STABBING;THROBBING

## 2025-07-28 ASSESSMENT — PAIN DESCRIPTION - ORIENTATION
ORIENTATION: MID;LOWER
ORIENTATION: LOWER
ORIENTATION: LOWER;UPPER

## 2025-07-28 ASSESSMENT — PAIN DESCRIPTION - ONSET
ONSET: ON-GOING

## 2025-07-28 ASSESSMENT — PAIN DESCRIPTION - FREQUENCY
FREQUENCY: CONTINUOUS

## 2025-07-28 ASSESSMENT — PAIN DESCRIPTION - PAIN TYPE
TYPE: CHRONIC PAIN
TYPE: CHRONIC PAIN
TYPE: ACUTE PAIN;CHRONIC PAIN

## 2025-07-28 ASSESSMENT — PAIN DESCRIPTION - DIRECTION: RADIATING_TOWARDS: NO

## 2025-07-28 NOTE — H&P
Hospital Medicine History & Physical      PCP: Leo Howard DO    Date of Admission: 7/28/2025    Date of Service: Pt seen/examined on 07/28/25     Chief Complaint:    Chief Complaint   Patient presents with    Seizures     Pt states he has been having seizures for  awhile.  Not sure if that is actually what is going on.  Daughter states she has seen them and he jerks and falls.  Pt states he does jerk and fall but remembers the events.  Does state he had an event on Saturday that he does not remember.         History Of Present Illness:    Limited 2/2 patient's baseline dementia/ cognitive declining state.   The patient is a 49 y.o. male with PMHx of COPD, HTN, hyperlipidemia, CAD status post PCI, PTSD, GERD, cervical spine surgery  who presents to [facility] for jerking/ tremor episodes. He states that he was outside on his porch on Saturday and had an episode where he \"blacked out\" and woke up on the ground. He has had multiple similar episodes recently, including this past Tuesday where he had jerking movements in front of his caregiver, passed out, and split his chin open. He has had episodes like this for a while, but he did not use to \"black out\" during them- he used to be able to remember starting to jerk and collapsing to the ground, until the past month or so. He is not sure if he has a prodrome, but states that he usually feels dizzy and has \"starry vision.\" He does usually wake up somewhat confused after the episodes. He denies ever losing bowel or bladder control during the episodes. He also reports that he has had intermittent issues with dysphagia/ trouble swallowing his medications. He has not had an alcoholic beverage in years. He endorses smoking about 1/2 ppd. History obtained from the patient and review of EMR.     Past Medical History:        Diagnosis Date    Anxiety     CAD (coronary artery disease)     COPD (chronic obstructive pulmonary disease) (HCC)     Depression     Gout     both

## 2025-07-28 NOTE — ED NOTES
.Osvaldo Noble is a 49 y.o. male admitted for  Principal Problem:    Seizure (HCC)  Resolved Problems:    * No resolved hospital problems. *  .   Patient Home via caregiver with   Chief Complaint   Patient presents with    Seizures     Pt states he has been having seizures for  awhile.  Not sure if that is actually what is going on.  Daughter states she has seen them and he jerks and falls.  Pt states he does jerk and fall but remembers the events.  Does state he had an event on Saturday that he does not remember.   .  Patient is alert and Person, place time and situation  Patient's baseline mobility: Baseline Mobility: Cane   Code Status: Prior   Cardiac Rhythm:       Is patient on baseline Oxygen: no how many Liters:   Abnormal Assessment Findings: No abnormal findings    Isolation: None      NIH Score:    C-SSRS: Risk of Suicide: No Risk  Bedside swallow:        Active LDA's:   Peripheral IV 07/28/25 Left Antecubital (Active)   Site Assessment Clean, dry & intact 07/28/25 1138   Line Status Blood return noted;Normal saline locked;Flushed;Specimen collected 07/28/25 1138     Patient admitted with a smith: no If the smith is chronic was it exchanged:  Reason for smith:   Patient admitted with Central Line:  . PICC line placement confirmed: YES OR NO:[id number]}   Reason for Central line:   Was central line Inserted from an outside facility:        Family/Caregiver Present no Any Concerns: no   Restraints no  Sitter no         Vitals: MEWS Score: 1    Vitals:    07/28/25 1401 07/28/25 1402 07/28/25 1432 07/28/25 1602   BP:  113/76 119/73 (!) 147/101   Pulse: 50 55 54 60   Resp: 13 21 11 16   Temp:       TempSrc:       SpO2: 97%      Weight:           Last documented pain score (0-10 scale) Pain Level: 8  Pain medication administered No.    Pertinent or High Risk Medications/Drips: No.    Pending Blood Product Administration: no    Abnormal labs:   Abnormal Labs Reviewed   CBC WITH AUTO DIFFERENTIAL - Abnormal;

## 2025-07-28 NOTE — ED PROVIDER NOTES
[facility] EMERGENCY DEPARTMENT  eMERGENCY dEPARTMENT eNCOUnter      Pt Name: Osvaldo Noble  MRN: [id number]  Birthdate 1975  Date of evaluation: 7/28/2025  Provider: Shane Anderson MD    CHIEF COMPLAINT       Chief Complaint   Patient presents with    Seizures     Pt states he has been having seizures for  awhile.  Not sure if that is actually what is going on.  Daughter states she has seen them and he jerks and falls.  Pt states he does jerk and fall but remembers the events.  Does state he had an event on Saturday that he does not remember.     HISTORY OF PRESENT ILLNESS   (Location/Symptom, Timing/Onset, Context/Setting, Quality, Duration, Modifying Factors, Severity)  Note limiting factors.     History obtained from: The patient and his caregiver    Osvaldo Noble is a 49 y.o. male who reports a history of Warnicke's encephalopathy from alcohol use and states he has been having episodes of diffuse tremors and falls for \"a while\" however for the past week reports that they have been happening daily and last for up to 2 hours at a time when they do.  Patient reports normally he is conscious for the entire time however over the past week there have been episodes where he has woken up on the ground after apparently losing consciousness after an episode and patient is concerned that he is having seizures.  Does report some head and neck pain.  Reports episodes are intermittent, occur approximately once a day, and last for 1 to 2 hours.  Denies any chest pain or shortness of breath.  Denies any fever.    HPI    Nursing Notes were reviewed.    REVIEW OFSYSTEMS    (2-9 systems for level 4, 10 or more for level 5)     Review of Systems   Constitutional:  Positive for fatigue. Negative for appetite change, fever and unexpected weight change.   HENT:  Negative for facial swelling, trouble swallowing and voice change.    Eyes:  Negative for photophobia and visual disturbance.   Respiratory:  Negative for

## 2025-07-28 NOTE — PROGRESS NOTES
Routine consult complete Dr. Cruz   Skin Substitute Units (Will Override Primary Defect Units If Greater Than 0): 0 Rhombic Flap Text: The defect edges were debeveled with a #15 scalpel blade. Given the location of the defect and the proximity to free margins a rhombic flap was deemed most appropriate. Using a sterile surgical marker, an appropriate rhombic flap was drawn incorporating the defect. The area thus outlined was incised deep to adipose tissue with a #15 scalpel blade. The skin margins were undermined to an appropriate distance in all directions utilizing iris scissors. Eliptical Excision Additional Text (Leave Blank If You Do Not Want): The margin was drawn around the clinically apparent lesion. An elliptical shape was then drawn on the skin incorporating the lesion and margins. Incisions were then made along these lines to the appropriate tissue plane and the lesion was extirpated. Melolabial Transposition Flap Text: The defect edges were debeveled with a #15 scalpel blade. Given the location of the defect and the proximity to free margins a melolabial flap was deemed most appropriate. Using a sterile surgical marker, an appropriate melolabial transposition flap was drawn incorporating the defect. The area thus outlined was incised deep to adipose tissue with a #15 scalpel blade. The skin margins were undermined to an appropriate distance in all directions utilizing iris scissors. Show Pathology Comment Variable: Yes Purse String (Simple) Text: Given the location of the defect and the characteristics of the surrounding skin a purse string simple closure was deemed most appropriate. Undermining was performed circumferentially around the surgical defect. A purse string suture was then placed and tightened. Double Island Pedicle Flap Text: The defect edges were debeveled with a #15 scalpel blade. Given the location of the defect, shape of the defect and the proximity to free margins a double island pedicle advancement flap was deemed most appropriate. Using a sterile surgical marker, an appropriate advancement flap was drawn incorporating the defect, outlining the appropriate donor tissue and placing the expected incisions within the relaxed skin tension lines where possible. The area thus outlined was incised deep to adipose tissue with a #15 scalpel blade. The skin margins were undermined to an appropriate distance in all directions around the primary defect and laterally outward around the island pedicle utilizing iris scissors. There was minimal undermining beneath the pedicle flap. Secondary Defect Length (In Cm): 0.4 Fusiform Excision Additional Text (Leave Blank If You Do Not Want): The margin was drawn around the clinically apparent lesion. A fusiform shape was then drawn on the skin incorporating the lesion and margins. Incisions were then made along these lines to the appropriate tissue plane and the lesion was extirpated. Excision Method: Elliptical Advancement Flap (Single) Text: After a lengthy discussion with the patient regarding the wound treatment reconstruction options available including but not limited to simple repair, intermediate repair, complex repair, adjacent tissue transfer, tissue graft as well as allowing the lesion to repair by secondary intention. It was determined that due to the defect location, complexity, and given indications; an adjacent tissue transfer (advancement flap) would be the most appropriate means for repair of the surgical defect as the defect extended through the skin into the subcutaneous tissues, and required rearrangement or transfer of adjacent tissue to repair the surgical wound. \\n\\n\\n\\n\\nThe defect edges were debeveled with a #15 scalpel blade. Given the location of the defect and the proximity to free margins a single advancement flap was deemed most appropriate. Using a sterile surgical marker, an appropriate advancement flap was drawn incorporating the defect and placing the expected incisions within the relaxed skin tension lines where possible. The area thus outlined was incised deep to adipose tissue with a #15 scalpel blade. The skin margins were undermined to an appropriate distance in all directions utilizing iris scissors. Epidermal Closure: running V-Y Plasty Text: The defect edges were debeveled with a #15 scalpel blade. Given the location of the defect, shape of the defect and the proximity to free margins an V-Y advancement flap was deemed most appropriate. Using a sterile surgical marker, an appropriate advancement flap was drawn incorporating the defect and placing the expected incisions within the relaxed skin tension lines where possible. The area thus outlined was incised deep to adipose tissue with a #15 scalpel blade. The skin margins were undermined to an appropriate distance in all directions utilizing iris scissors. Lab: [facility] [id number] [facility] Bilateral Helical Rim Advancement Flap Text: The defect edges were debeveled with a #15 blade scalpel. Given the location of the defect and the proximity to free margins (helical rim) a bilateral helical rim advancement flap was deemed most appropriate. Using a sterile surgical marker, the appropriate advancement flaps were drawn incorporating the defect and placing the expected incisions between the helical rim and antihelix where possible. The area thus outlined was incised through and through with a #15 scalpel blade. With a skin hook and iris scissors, the flaps were gently and sharply undermined and freed up. Z Plasty Text: The lesion was extirpated to the level of the fat with a #15 scalpel blade. Given the location of the defect, shape of the defect and the proximity to free margins a Z-plasty was deemed most appropriate for repair. Using a sterile surgical marker, the appropriate transposition arms of the Z-plasty were drawn incorporating the defect and placing the expected incisions within the relaxed skin tension lines where possible. The area thus outlined was incised deep to adipose tissue with a #15 scalpel blade. The skin margins were undermined to an appropriate distance in all directions utilizing iris scissors. The opposing transposition arms were then transposed into place in opposite direction and anchored with interrupted buried subcutaneous sutures. Star Wedge Flap Text: The defect edges were debeveled with a #15 scalpel blade. Given the location of the defect, shape of the defect and the proximity to free margins a star wedge flap was deemed most appropriate. Using a sterile surgical marker, an appropriate rotation flap was drawn incorporating the defect and placing the expected incisions within the relaxed skin tension lines where possible. The area thus outlined was incised deep to adipose tissue with a #15 scalpel blade. The skin margins were undermined to an appropriate distance in all directions utilizing iris scissors. Lip Wedge Excision Repair Text: Given the location of the defect and the proximity to free margins a full thickness wedge repair was deemed most appropriate. Using a sterile surgical marker, the appropriate repair was drawn incorporating the defect and placing the expected incisions perpendicular to the vermillion border. The vermillion border was also meticulously outlined to ensure appropriate reapproximation during the repair. The area thus outlined was incised through and through with a #15 scalpel blade. The muscularis and dermis were reaproximated with deep sutures following hemostasis. Care was taken to realign the vermillion border before proceeding with the superficial closure. Once the vermillion was realigned the superfical and mucosal closure was finished. Referring Physician (Optional): Donnita Spalding Bence-Franco PA-C Complex Repair And Z Plasty Text: The defect edges were debeveled with a #15 scalpel blade. The primary defect was closed partially with a complex linear closure. Given the location of the remaining defect, shape of the defect and the proximity to free margins a Z plasty was deemed most appropriate for complete closure of the defect. Using a sterile surgical marker, an appropriate advancement flap was drawn incorporating the defect and placing the expected incisions within the relaxed skin tension lines where possible. The area thus outlined was incised deep to adipose tissue with a #15 scalpel blade. The skin margins were undermined to an appropriate distance in all directions utilizing iris scissors. Perilesional Excision Additional Text (Leave Blank If You Do Not Want): The margin was drawn around the clinically apparent lesion. Incisions were then made along these lines to the appropriate tissue plane and the lesion was extirpated. Size Of Lesion In Cm: 0.8 Transposition Flap Text: The defect edges were debeveled with a #15 scalpel blade. Given the location of the defect and the proximity to free margins a transposition flap was deemed most appropriate. Using a sterile surgical marker, an appropriate transposition flap was drawn incorporating the defect. The area thus outlined was incised deep to adipose tissue with a #15 scalpel blade. The skin margins were undermined to an appropriate distance in all directions utilizing iris scissors. Bilobed Flap Text: The defect edges were debeveled with a #15 scalpel blade. Given the location of the defect and the proximity to free margins a bilobe flap was deemed most appropriate. Using a sterile surgical marker, an appropriate bilobe flap drawn around the defect. The area thus outlined was incised deep to adipose tissue with a #15 scalpel blade. The skin margins were undermined to an appropriate distance in all directions utilizing iris scissors. Ftsg Text: The defect edges were debeveled with a #15 scalpel blade. Given the location of the defect, shape of the defect and the proximity to free margins a full thickness skin graft was deemed most appropriate. Using a sterile surgical marker, the primary defect shape was transferred to the donor site. The area thus outlined was incised deep to adipose tissue with a #15 scalpel blade. The harvested graft was then trimmed of adipose tissue until only dermis and epidermis was left. The skin margins of the secondary defect were undermined to an appropriate distance in all directions utilizing iris scissors. The secondary defect was closed with interrupted buried subcutaneous sutures. The skin edges were then re-apposed with running  sutures. The skin graft was then placed in the primary defect and oriented appropriately. Anesthesia Volume In Cc: 6 Excision Depth: adipose tissue Island Pedicle Flap-Requiring Vessel Identification Text: The defect edges were debeveled with a #15 scalpel blade. Given the location of the defect, shape of the defect and the proximity to free margins an island pedicle advancement flap was deemed most appropriate. Using a sterile surgical marker, an appropriate advancement flap was drawn, based on the axial vessel mentioned above, incorporating the defect, outlining the appropriate donor tissue and placing the expected incisions within the relaxed skin tension lines where possible. The area thus outlined was incised deep to adipose tissue with a #15 scalpel blade. The skin margins were undermined to an appropriate distance in all directions around the primary defect and laterally outward around the island pedicle utilizing iris scissors. There was minimal undermining beneath the pedicle flap. Ear Star Wedge Flap Text: The defect edges were debeveled with a #15 blade scalpel. Given the location of the defect and the proximity to free margins (helical rim) an ear star wedge flap was deemed most appropriate. Using a sterile surgical marker, the appropriate flap was drawn incorporating the defect and placing the expected incisions between the helical rim and antihelix where possible. The area thus outlined was incised through and through with a #15 scalpel blade. Alar Island Pedicle Flap Text: The defect edges were debeveled with a #15 scalpel blade. Given the location of the defect, shape of the defect and the proximity to the alar rim an island pedicle advancement flap was deemed most appropriate. Using a sterile surgical marker, an appropriate advancement flap was drawn incorporating the defect, outlining the appropriate donor tissue and placing the expected incisions within the nasal ala running parallel to the alar rim. The area thus outlined was incised with a #15 scalpel blade. The skin margins were undermined minimally to an appropriate distance in all directions around the primary defect and laterally outward around the island pedicle utilizing iris scissors. There was minimal undermining beneath the pedicle flap. A-T Advancement Flap Text: The defect edges were debeveled with a #15 scalpel blade. Given the location of the defect, shape of the defect and the proximity to free margins an A-T advancement flap was deemed most appropriate. Using a sterile surgical marker, an appropriate advancement flap was drawn incorporating the defect and placing the expected incisions within the relaxed skin tension lines where possible. The area thus outlined was incised deep to adipose tissue with a #15 scalpel blade. The skin margins were undermined to an appropriate distance in all directions utilizing iris scissors. Surgeon (Optional): Dr Phillip Garsia Billing Type: [employer] Date Of Previous Biopsy (Optional): 1/4/18 Split-Thickness Skin Graft Text: The defect edges were debeveled with a #15 scalpel blade. Given the location of the defect, shape of the defect and the proximity to free margins a split thickness skin graft was deemed most appropriate. Using a sterile surgical marker, the primary defect shape was transferred to the donor site. The split thickness graft was then harvested. The skin graft was then placed in the primary defect and oriented appropriately. Complex Repair And Melolabial Flap Text: The defect edges were debeveled with a #15 scalpel blade. The primary defect was closed partially with a complex linear closure. Given the location of the remaining defect, shape of the defect and the proximity to free margins a melolabial flap was deemed most appropriate for complete closure of the defect. Using a sterile surgical marker, an appropriate advancement flap was drawn incorporating the defect and placing the expected incisions within the relaxed skin tension lines where possible. The area thus outlined was incised deep to adipose tissue with a #15 scalpel blade. The skin margins were undermined to an appropriate distance in all directions utilizing iris scissors. Cheek Interpolation Flap Text: A decision was made to reconstruct the defect utilizing an interpolation axial flap and a staged reconstruction. A telfa template was made of the defect. This telfa template was then used to outline the Cheek Interpolation flap. The donor area for the pedicle flap was then injected with anesthesia. The flap was excised through the skin and subcutaneous tissue down to the layer of the underlying musculature. The interpolation flap was carefully excised within this deep plane to maintain its blood supply. The edges of the donor site were undermined. The donor site was closed in a primary fashion. The pedicle was then rotated into position and sutured. Once the tube was sutured into place, adequate blood supply was confirmed with blanching and refill. The pedicle was then wrapped with xeroform gauze and dressed appropriately with a telfa and gauze bandage to ensure continued blood supply and protect the attached pedicle. Complex Repair And Xenograft Text: The defect edges were debeveled with a #15 scalpel blade. The primary defect was closed partially with a complex linear closure. Given the location of the defect, shape of the defect and the proximity to free margins an tissue cultured epidermal autograft was deemed most appropriate to repair the remaining defect. The graft was trimmed to fit the size of the remaining defect. The graft was then placed in the primary defect, oriented appropriately, and sutured into place. Skin Substitute Text: The defect edges were debeveled with a #15 scalpel blade. Given the location of the defect, shape of the defect and the proximity to free margins a skin substitute graft was deemed most appropriate. The graft material was trimmed to fit the size of the defect. The graft was then placed in the primary defect and oriented appropriately. Scalpel Size: 15 blade Accession #: [id number] Anesthesia Type: 1% lidocaine with epinephrine and a 1:10 solution of 8.4% sodium bicarbonate Keystone Flap Text: The defect edges were debeveled with a #15 scalpel blade. Given the location of the defect, shape of the defect a keystone flap was deemed most appropriate. Using a sterile surgical marker, an appropriate keystone flap was drawn incorporating the defect, outlining the appropriate donor tissue and placing the expected incisions within the relaxed skin tension lines where possible. The area thus outlined was incised deep to adipose tissue with a #15 scalpel blade. The skin margins were undermined to an appropriate distance in all directions around the primary defect and laterally outward around the flap utilizing iris scissors. Secondary Defect Width (In Cm): 0.5 Hatchet Flap Text: The defect edges were debeveled with a #15 scalpel blade. Given the location of the defect, shape of the defect and the proximity to free margins a hatchet flap was deemed most appropriate. Using a sterile surgical marker, an appropriate hatchet flap was drawn incorporating the defect and placing the expected incisions within the relaxed skin tension lines where possible. The area thus outlined was incised deep to adipose tissue with a #15 scalpel blade. The skin margins were undermined to an appropriate distance in all directions utilizing iris scissors. Bi-Rhombic Flap Text: The defect edges were debeveled with a #15 scalpel blade. Given the location of the defect and the proximity to free margins a bi-rhombic flap was deemed most appropriate. Using a sterile surgical marker, an appropriate rhombic flap was drawn incorporating the defect. The area thus outlined was incised deep to adipose tissue with a #15 scalpel blade. The skin margins were undermined to an appropriate distance in all directions utilizing iris scissors. Size Of Margin In Cm: 0.1 Complex Repair And O-T Advancement Flap Text: The defect edges were debeveled with a #15 scalpel blade. The primary defect was closed partially with a complex linear closure. Given the location of the remaining defect, shape of the defect and the proximity to free margins an O-T advancement flap was deemed most appropriate for complete closure of the defect. Using a sterile surgical marker, an appropriate advancement flap was drawn incorporating the defect and placing the expected incisions within the relaxed skin tension lines where possible. The area thus outlined was incised deep to adipose tissue with a #15 scalpel blade. The skin margins were undermined to an appropriate distance in all directions utilizing iris scissors. Paramedian Forehead Flap Text: A decision was made to reconstruct the defect utilizing an interpolation axial flap and a staged reconstruction. A telfa template was made of the defect. This telfa template was then used to outline the paramedian forehead pedicle flap. The donor area for the pedicle flap was then injected with anesthesia. The flap was excised through the skin and subcutaneous tissue down to the layer of the underlying musculature. The pedicle flap was carefully excised within this deep plane to maintain its blood supply. The edges of the donor site were undermined. The donor site was closed in a primary fashion. The pedicle was then rotated into position and sutured. Once the tube was sutured into place, adequate blood supply was confirmed with blanching and refill. The pedicle was then wrapped with xeroform gauze and dressed appropriately with a telfa and gauze bandage to ensure continued blood supply and protect the attached pedicle. Graft Donor Site Will Heal By Secondary Intention: No H Plasty Text: Given the location of the defect, shape of the defect and the proximity to free margins a H-plasty was deemed most appropriate for repair. Using a sterile surgical marker, the appropriate advancement arms of the H-plasty were drawn incorporating the defect and placing the expected incisions within the relaxed skin tension lines where possible. The area thus outlined was incised deep to adipose tissue with a #15 scalpel blade. The skin margins were undermined to an appropriate distance in all directions utilizing iris scissors. The opposing advancement arms were then advanced into place in opposite direction and anchored with interrupted buried subcutaneous sutures. Tissue Cultured Epidermal Autograft Text: The defect edges were debeveled with a #15 scalpel blade. Given the location of the defect, shape of the defect and the proximity to free margins a tissue cultured epidermal autograft was deemed most appropriate. The graft was then trimmed to fit the size of the defect. The graft was then placed in the primary defect and oriented appropriately. Cartilage Graft Text: The defect edges were debeveled with a #15 scalpel blade. Given the location of the defect, shape of the defect, the fact the defect involved a full thickness cartilage defect a cartilage graft was deemed most appropriate. An appropriate donor site was identified, cleansed, and anesthetized. The cartilage graft was then harvested and transferred to the recipient site, oriented appropriately and then sutured into place. The secondary defect was then repaired using a primary closure. Medical Necessity Clause: This procedure was medically necessary because the lesion that was treated was: growing and irritating to the patient Complex Repair And Double M Plasty Text: The defect edges were debeveled with a #15 scalpel blade. The primary defect was closed partially with a complex linear closure. Given the location of the remaining defect, shape of the defect and the proximity to free margins a double M plasty was deemed most appropriate for complete closure of the defect. Using a sterile surgical marker, an appropriate advancement flap was drawn incorporating the defect and placing the expected incisions within the relaxed skin tension lines where possible. The area thus outlined was incised deep to adipose tissue with a #15 scalpel blade. The skin margins were undermined to an appropriate distance in all directions utilizing iris scissors. Complex Repair And A-T Advancement Flap Text: The defect edges were debeveled with a #15 scalpel blade. The primary defect was closed partially with a complex linear closure. Given the location of the remaining defect, shape of the defect and the proximity to free margins an A-T advancement flap was deemed most appropriate for complete closure of the defect. Using a sterile surgical marker, an appropriate advancement flap was drawn incorporating the defect and placing the expected incisions within the relaxed skin tension lines where possible. The area thus outlined was incised deep to adipose tissue with a #15 scalpel blade. The skin margins were undermined to an appropriate distance in all directions utilizing iris scissors. Island Pedicle Flap With Canthal Suspension Text: The defect edges were debeveled with a #15 scalpel blade. Given the location of the defect, shape of the defect and the proximity to free margins an island pedicle advancement flap was deemed most appropriate. Using a sterile surgical marker, an appropriate advancement flap was drawn incorporating the defect, outlining the appropriate donor tissue and placing the expected incisions within the relaxed skin tension lines where possible. The area thus outlined was incised deep to adipose tissue with a #15 scalpel blade. The skin margins were undermined to an appropriate distance in all directions around the primary defect and laterally outward around the island pedicle utilizing iris scissors. There was minimal undermining beneath the pedicle flap. A suspension suture was placed in the canthal tendon to prevent tension and prevent ectropion. No Repair - Repaired With Adjacent Surgical Defect Text (Leave Blank If You Do Not Want): After the excision the defect was repaired concurrently with another surgical defect which was in close approximation. O-T Advancement Flap Text: The defect edges were debeveled with a #15 scalpel blade. Given the location of the defect, shape of the defect and the proximity to free margins an O-T advancement flap was deemed most appropriate. Using a sterile surgical marker, an appropriate advancement flap was drawn incorporating the defect and placing the expected incisions within the relaxed skin tension lines where possible. The area thus outlined was incised deep to adipose tissue with a #15 scalpel blade. The skin margins were undermined to an appropriate distance in all directions utilizing iris scissors. O-L Flap Text: The defect edges were debeveled with a #15 scalpel blade. Given the location of the defect, shape of the defect and the proximity to free margins an O-L flap was deemed most appropriate. Using a sterile surgical marker, an appropriate advancement flap was drawn incorporating the defect and placing the expected incisions within the relaxed skin tension lines where possible. The area thus outlined was incised deep to adipose tissue with a #15 scalpel blade. The skin margins were undermined to an appropriate distance in all directions utilizing iris scissors. Mucosal Advancement Flap Text: Given the location of the defect, shape of the defect and the proximity to free margins a mucosal advancement flap was deemed most appropriate. Incisions were made with a 15 blade scalpel in the appropriate fashion along the cutaneous vermillion border and the mucosal lip. The remaining actinically damaged mucosal tissue was excised. The mucosal advancement flap was then elevated to the gingival sulcus with care taken to preserve the neurovascular structures and advanced into the primary defect. Care was taken to ensure that precise realignment of the vermillion border was achieved. Bilobed Transposition Flap Text: The defect edges were debeveled with a #15 scalpel blade. Given the location of the defect and the proximity to free margins a bilobed transposition flap was deemed most appropriate. Using a sterile surgical marker, an appropriate bilobe flap drawn around the defect. The area thus outlined was incised deep to adipose tissue with a #15 scalpel blade. The skin margins were undermined to an appropriate distance in all directions utilizing iris scissors. Complex Repair And Transposition Flap Text: The defect edges were debeveled with a #15 scalpel blade. The primary defect was closed partially with a complex linear closure. Given the location of the remaining defect, shape of the defect and the proximity to free margins a transposition flap was deemed most appropriate for complete closure of the defect. Using a sterile surgical marker, an appropriate advancement flap was drawn incorporating the defect and placing the expected incisions within the relaxed skin tension lines where possible. The area thus outlined was incised deep to adipose tissue with a #15 scalpel blade. The skin margins were undermined to an appropriate distance in all directions utilizing iris scissors. Xenograft Text: The defect edges were debeveled with a #15 scalpel blade. Given the location of the defect, shape of the defect and the proximity to free margins a xenograft was deemed most appropriate. The graft was then trimmed to fit the size of the defect. The graft was then placed in the primary defect and oriented appropriately. Complex Repair And Tissue Cultured Epidermal Autograft Text: The defect edges were debeveled with a #15 scalpel blade.  The primary defect was closed partially with a complex linear closure.  Given the location of the defect, shape of the defect and the proximity to free margins an tissue cultured epidermal autograft was deemed most appropriate to repair the remaining defect.  The graft was trimmed to fit the size of the remaining defect.  The graft was then placed in the primary defect, oriented appropriately, and sutured into place. Detail Level: Detailed Complex Repair And W Plasty Text: The defect edges were debeveled with a #15 scalpel blade. The primary defect was closed partially with a complex linear closure. Given the location of the remaining defect, shape of the defect and the proximity to free margins a W plasty was deemed most appropriate for complete closure of the defect. Using a sterile surgical marker, an appropriate advancement flap was drawn incorporating the defect and placing the expected incisions within the relaxed skin tension lines where possible. The area thus outlined was incised deep to adipose tissue with a #15 scalpel blade. The skin margins were undermined to an appropriate distance in all directions utilizing iris scissors. Complex Repair And Rhombic Flap Text: The defect edges were debeveled with a #15 scalpel blade. The primary defect was closed partially with a complex linear closure. Given the location of the remaining defect, shape of the defect and the proximity to free margins a rhombic flap was deemed most appropriate for complete closure of the defect. Using a sterile surgical marker, an appropriate advancement flap was drawn incorporating the defect and placing the expected incisions within the relaxed skin tension lines where possible. The area thus outlined was incised deep to adipose tissue with a #15 scalpel blade. The skin margins were undermined to an appropriate distance in all directions utilizing iris scissors. Estimated Blood Loss (Cc): minimal Dressing: pressure dressing with telfa Spiral Flap Text: The defect edges were debeveled with a #15 scalpel blade. Given the location of the defect, shape of the defect and the proximity to free margins a spiral flap was deemed most appropriate. Using a sterile surgical marker, an appropriate rotation flap was drawn incorporating the defect and placing the expected incisions within the relaxed skin tension lines where possible. The area thus outlined was incised deep to adipose tissue with a #15 scalpel blade. The skin margins were undermined to an appropriate distance in all directions utilizing iris scissors. Complex Repair And Skin Substitute Graft Text: The defect edges were debeveled with a #15 scalpel blade. The primary defect was closed partially with a complex linear closure. Given the location of the remaining defect, shape of the defect and the proximity to free margins a skin substitute graft was deemed most appropriate to repair the remaining defect. The graft was trimmed to fit the size of the remaining defect. The graft was then placed in the primary defect, oriented appropriately, and sutured into place. Complex Repair And Bilobe Flap Text: The defect edges were debeveled with a #15 scalpel blade. The primary defect was closed partially with a complex linear closure. Given the location of the remaining defect, shape of the defect and the proximity to free margins a bilobe flap was deemed most appropriate for complete closure of the defect. Using a sterile surgical marker, an appropriate advancement flap was drawn incorporating the defect and placing the expected incisions within the relaxed skin tension lines where possible. The area thus outlined was incised deep to adipose tissue with a #15 scalpel blade. The skin margins were undermined to an appropriate distance in all directions utilizing iris scissors. Complex Repair And O-L Flap Text: The defect edges were debeveled with a #15 scalpel blade. The primary defect was closed partially with a complex linear closure. Given the location of the remaining defect, shape of the defect and the proximity to free margins an O-L flap was deemed most appropriate for complete closure of the defect. Using a sterile surgical marker, an appropriate flap was drawn incorporating the defect and placing the expected incisions within the relaxed skin tension lines where possible. The area thus outlined was incised deep to adipose tissue with a #15 scalpel blade. The skin margins were undermined to an appropriate distance in all directions utilizing iris scissors. Epidermal Autograft Text: The defect edges were debeveled with a #15 scalpel blade. Given the location of the defect, shape of the defect and the proximity to free margins an epidermal autograft was deemed most appropriate. Using a sterile surgical marker, the primary defect shape was transferred to the donor site. The epidermal graft was then harvested. The skin graft was then placed in the primary defect and oriented appropriately. O-Z Plasty Text: The defect edges were debeveled with a #15 scalpel blade. Given the location of the defect, shape of the defect and the proximity to free margins an O-Z plasty (double transposition flap) was deemed most appropriate. Using a sterile surgical marker, the appropriate transposition flaps were drawn incorporating the defect and placing the expected incisions within the relaxed skin tension lines where possible. The area thus outlined was incised deep to adipose tissue with a #15 scalpel blade. The skin margins were undermined to an appropriate distance in all directions utilizing iris scissors. Hemostasis was achieved with electrocautery. The flaps were then transposed into place, one clockwise and the other counterclockwise, and anchored with interrupted buried subcutaneous sutures. Melolabial Interpolation Flap Text: A decision was made to reconstruct the defect utilizing an interpolation axial flap and a staged reconstruction. A telfa template was made of the defect. This telfa template was then used to outline the melolabial interpolation flap. The donor area for the pedicle flap was then injected with anesthesia. The flap was excised through the skin and subcutaneous tissue down to the layer of the underlying musculature. The pedicle flap was carefully excised within this deep plane to maintain its blood supply. The edges of the donor site were undermined. The donor site was closed in a primary fashion. The pedicle was then rotated into position and sutured. Once the tube was sutured into place, adequate blood supply was confirmed with blanching and refill. The pedicle was then wrapped with xeroform gauze and dressed appropriately with a telfa and gauze bandage to ensure continued blood supply and protect the attached pedicle. Complex Repair And V-Y Plasty Text: The defect edges were debeveled with a #15 scalpel blade. The primary defect was closed partially with a complex linear closure. Given the location of the remaining defect, shape of the defect and the proximity to free margins a V-Y plasty was deemed most appropriate for complete closure of the defect. Using a sterile surgical marker, an appropriate advancement flap was drawn incorporating the defect and placing the expected incisions within the relaxed skin tension lines where possible. The area thus outlined was incised deep to adipose tissue with a #15 scalpel blade. The skin margins were undermined to an appropriate distance in all directions utilizing iris scissors. Body Location Override (Optional - Billing Will Still Be Based On Selected Body Map Location If Applicable): RIGHT superior back Excisional Biopsy Additional Text (Leave Blank If You Do Not Want): The margin was drawn around the clinically apparent lesion. An elliptical shape was then drawn on the skin incorporating the lesion and margins.  Incisions were then made along these lines to the appropriate tissue plane and the lesion was extirpated. Posterior Auricular Interpolation Flap Text: A decision was made to reconstruct the defect utilizing an interpolation axial flap and a staged reconstruction. A telfa template was made of the defect. This telfa template was then used to outline the posterior auricular interpolation flap. The donor area for the pedicle flap was then injected with anesthesia. The flap was excised through the skin and subcutaneous tissue down to the layer of the underlying musculature. The pedicle flap was carefully excised within this deep plane to maintain its blood supply. The edges of the donor site were undermined. The donor site was closed in a primary fashion. The pedicle was then rotated into position and sutured. Once the tube was sutured into place, adequate blood supply was confirmed with blanching and refill. The pedicle was then wrapped with xeroform gauze and dressed appropriately with a telfa and gauze bandage to ensure continued blood supply and protect the attached pedicle. Crescentic Complex Repair Preamble Text (Leave Blank If You Do Not Want): Extensive wide undermining was performed. Hemostasis: Electrocautery Repair Performed By Another Provider Text (Leave Blank If You Do Not Want): After the tissue was excised the defect was repaired by another provider. Complex Repair And Modified Advancement Flap Text: The defect edges were debeveled with a #15 scalpel blade. The primary defect was closed partially with a complex linear closure. Given the location of the remaining defect, shape of the defect and the proximity to free margins a modified advancement flap was deemed most appropriate for complete closure of the defect. Using a sterile surgical marker, an appropriate advancement flap was drawn incorporating the defect and placing the expected incisions within the relaxed skin tension lines where possible. The area thus outlined was incised deep to adipose tissue with a #15 scalpel blade. The skin margins were undermined to an appropriate distance in all directions utilizing iris scissors. Modified Advancement Flap Text: The defect edges were debeveled with a #15 scalpel blade. Given the location of the defect, shape of the defect and the proximity to free margins a modified advancement flap was deemed most appropriate. Using a sterile surgical marker, an appropriate advancement flap was drawn incorporating the defect and placing the expected incisions within the relaxed skin tension lines where possible. The area thus outlined was incised deep to adipose tissue with a #15 scalpel blade. The skin margins were undermined to an appropriate distance in all directions utilizing iris scissors. Complex Repair And Dorsal Nasal Flap Text: The defect edges were debeveled with a #15 scalpel blade. The primary defect was closed partially with a complex linear closure. Given the location of the remaining defect, shape of the defect and the proximity to free margins a dorsal nasal flap was deemed most appropriate for complete closure of the defect. Using a sterile surgical marker, an appropriate flap was drawn incorporating the defect and placing the expected incisions within the relaxed skin tension lines where possible. The area thus outlined was incised deep to adipose tissue with a #15 scalpel blade. The skin margins were undermined to an appropriate distance in all directions utilizing iris scissors. Burow's Advancement Flap Text: The defect edges were debeveled with a #15 scalpel blade. Given the location of the defect and the proximity to free margins a Burow's advancement flap was deemed most appropriate. Using a sterile surgical marker, the appropriate advancement flap was drawn incorporating the defect and placing the expected incisions within the relaxed skin tension lines where possible. The area thus outlined was incised deep to adipose tissue with a #15 scalpel blade. The skin margins were undermined to an appropriate distance in all directions utilizing iris scissors. Composite Graft Text: The defect edges were debeveled with a #15 scalpel blade. Given the location of the defect, shape of the defect, the proximity to free margins and the fact the defect was full thickness a composite graft was deemed most appropriate. The defect was outline and then transferred to the donor site. A full thickness graft was then excised from the donor site. The graft was then placed in the primary defect, oriented appropriately and then sutured into place. The secondary defect was then repaired using a primary closure. Suture Removal: 14 days Epidermal Closure Graft Donor Site (Optional): simple interrupted W Plasty Text: The lesion was extirpated to the level of the fat with a #15 scalpel blade. Given the location of the defect, shape of the defect and the proximity to free margins a W-plasty was deemed most appropriate for repair. Using a sterile surgical marker, the appropriate transposition arms of the W-plasty were drawn incorporating the defect and placing the expected incisions within the relaxed skin tension lines where possible. The area thus outlined was incised deep to adipose tissue with a #15 scalpel blade. The skin margins were undermined to an appropriate distance in all directions utilizing iris scissors. The opposing transposition arms were then transposed into place in opposite direction and anchored with interrupted buried subcutaneous sutures. Previous Accession (Optional): [id number] Dermal Autograft Text: The defect edges were debeveled with a #15 scalpel blade. Given the location of the defect, shape of the defect and the proximity to free margins a dermal autograft was deemed most appropriate. Using a sterile surgical marker, the primary defect shape was transferred to the donor site. The area thus outlined was incised deep to adipose tissue with a #15 scalpel blade. The harvested graft was then trimmed of adipose and epidermal tissue until only dermis was left. The skin graft was then placed in the primary defect and oriented appropriately. Helical Rim Advancement Flap Text: The defect edges were debeveled with a #15 blade scalpel. Given the location of the defect and the proximity to free margins (helical rim) a double helical rim advancement flap was deemed most appropriate. Using a sterile surgical marker, the appropriate advancement flaps were drawn incorporating the defect and placing the expected incisions between the helical rim and antihelix where possible. The area thus outlined was incised through and through with a #15 scalpel blade. With a skin hook and iris scissors, the flaps were gently and sharply undermined and freed up. Surgeon Performing Repair (Optional): Dr Gita Batista Purse String (Intermediate) Text: Given the location of the defect and the characteristics of the surrounding skin a pursestring intermediate closure was deemed most appropriate. Undermining was performed circumfirentially around the surgical defect. A purstring suture was then placed and tightened. Medical Necessity Information: It is in your best interest to select a reason for this procedure from the list below. All of these items fulfill various CMS LCD requirements except lesion extends to a margin. Interpolation Flap Text: A decision was made to reconstruct the defect utilizing an interpolation axial flap and a staged reconstruction. A telfa template was made of the defect. This telfa template was then used to outline the interpolation flap. The donor area for the pedicle flap was then injected with anesthesia. The flap was excised through the skin and subcutaneous tissue down to the layer of the underlying musculature. The interpolation flap was carefully excised within this deep plane to maintain its blood supply. The edges of the donor site were undermined. The donor site was closed in a primary fashion. The pedicle was then rotated into position and sutured. Once the tube was sutured into place, adequate blood supply was confirmed with blanching and refill. The pedicle was then wrapped with xeroform gauze and dressed appropriately with a telfa and gauze bandage to ensure continued blood supply and protect the attached pedicle. Graft Donor Site Bandage (Optional-Leave Blank If You Don't Want In Note): Steri-strips and a pressure bandage were applied to the donor site. Complex Repair And Dermal Autograft Text: The defect edges were debeveled with a #15 scalpel blade. The primary defect was closed partially with a complex linear closure. Given the location of the defect, shape of the defect and the proximity to free margins an dermal autograft was deemed most appropriate to repair the remaining defect. The graft was trimmed to fit the size of the remaining defect. The graft was then placed in the primary defect, oriented appropriately, and sutured into place. Complex Repair And Double Advancement Flap Text: The defect edges were debeveled with a #15 scalpel blade. The primary defect was closed partially with a complex linear closure. Given the location of the remaining defect, shape of the defect and the proximity to free margins a double advancement flap was deemed most appropriate for complete closure of the defect. Using a sterile surgical marker, an appropriate advancement flap was drawn incorporating the defect and placing the expected incisions within the relaxed skin tension lines where possible. The area thus outlined was incised deep to adipose tissue with a #15 scalpel blade. The skin margins were undermined to an appropriate distance in all directions utilizing iris scissors. Primary Defect Length (In Cm): 1 Dorsal Nasal Flap Text: The defect edges were debeveled with a #15 scalpel blade. Given the location of the defect and the proximity to free margins a dorsal nasal flap was deemed most appropriate. Using a sterile surgical marker, an appropriate dorsal nasal flap was drawn around the defect. The area thus outlined was incised deep to adipose tissue with a #15 scalpel blade. The skin margins were undermined to an appropriate distance in all directions utilizing iris scissors. Post-Care Instructions: WOUND CARE INSTRUCTIONS\\nI reviewed the post-care instructions with the patient in detail. \\n\\nKeep our initial bandage on and dry for 48-72 hours as directed. After 48-72 hours,\\n1. Cleanse the wound with peroxide gently. If there is a lot of crusting/scabbing, soak the site with peroxide to soften. \\n2. Apply a generous amount of Vaseline to the surgical site. DO NOT use Neosporin. \\n3. Cover the wound with a dressing. You can use a non-stick pad with paper tape or regular bandages. \\n4. Repeat twice daily, once in the morning, once in the evening. \\n\\n\\nPAIN CONTROL\\n1. It is common to experience swelling, bruising, and drainage after surgery. To decrease pain, use extra strength Tylenol as directed on the bottle. Please do not use ibuprofen, Aleve, Motrin, or Excedrin as they may worsen bleeding. If Tylenol does not help with your pain, please call our office. Certain pain medications may require you to come to the office to  an actual paper prescription. Other intermediate (non-narcotic) strength pain medications may be called in for you if necessary. \\n\\n2. To decrease pain, patients can also try using an ice pack, a bag of frozen green peas, or a bag of frozen marshmellows. Place the ice pack on top of the bandage for 20-30 minutes, then take a break for 20-30 minutes (place the ice pack back in the freezer to get it cold again). Repeat as many times as necessary. \\n\\n\\nBLEEDING CONTROL\\nIf bleeding should occur, apply firm direct pressure to the site for 30 minutes without easing up. If bleeding continues after 30 minutes, please call the office at any time. In rare instances, it may be necessary to go to the nearest emergency room. \\n\\n\\nMISCELLANEOUS INFORMATION\\nThings to avoid while healing:\\n - NO heavy lifting, exercise, or swimming for the next 14 days. \\n - NO exposure to tap water for the next 48-72 hours. \\n - NO exposure to hot tub, swimming pool, or ocean water for the next 14 days. \\n\\n\\nCONTACT INFORMATION\\nShould you have any questions or concerns, please call:\\n\\n1. [id number] [facility] Location = [phone]\\n\\n2.  [address] = [phone] [place] Pedicle Flap Text: The defect edges were debeveled with a #15 scalpel blade. Given the location of the defect, shape of the defect and the proximity to free margins an island pedicle advancement flap was deemed most appropriate. Using a sterile surgical marker, an appropriate advancement flap was drawn incorporating the defect, outlining the appropriate donor tissue and placing the expected incisions within the relaxed skin tension lines where possible. The area thus outlined was incised deep to adipose tissue with a #15 scalpel blade. The skin margins were undermined to an appropriate distance in all directions around the primary defect and laterally outward around the island pedicle utilizing iris scissors. There was minimal undermining beneath the pedicle flap. Cheek-To-Nose Interpolation Flap Text: A decision was made to reconstruct the defect utilizing an interpolation axial flap and a staged reconstruction. A telfa template was made of the defect. This telfa template was then used to outline the Cheek-To-Nose Interpolation flap. The donor area for the pedicle flap was then injected with anesthesia. The flap was excised through the skin and subcutaneous tissue down to the layer of the underlying musculature. The interpolation flap was carefully excised within this deep plane to maintain its blood supply. The edges of the donor site were undermined. The donor site was closed in a primary fashion. The pedicle was then rotated into position and sutured. Once the tube was sutured into place, adequate blood supply was confirmed with blanching and refill. The pedicle was then wrapped with xeroform gauze and dressed appropriately with a telfa and gauze bandage to ensure continued blood supply and protect the attached pedicle. Saucerization Excision Additional Text (Leave Blank If You Do Not Want): The margin was drawn around the clinically apparent lesion. Incisions were then made along these lines, in a tangential fashion, to the appropriate tissue plane and the lesion was extirpated. Lab Facility: [id number] Type: Flap Mastoid Interpolation Flap Text: A decision was made to reconstruct the defect utilizing an interpolation axial flap and a staged reconstruction. A telfa template was made of the defect. This telfa template was then used to outline the mastoid interpolation flap. The donor area for the pedicle flap was then injected with anesthesia. The flap was excised through the skin and subcutaneous tissue down to the layer of the underlying musculature. The pedicle flap was carefully excised within this deep plane to maintain its blood supply. The edges of the donor site were undermined. The donor site was closed in a primary fashion. The pedicle was then rotated into position and sutured. Once the tube was sutured into place, adequate blood supply was confirmed with blanching and refill. The pedicle was then wrapped with xeroform gauze and dressed appropriately with a telfa and gauze bandage to ensure continued blood supply and protect the attached pedicle. Flap Type: S plasty S Plasty Text: Given the location and shape of the defect, and the orientation of relaxed skin tension lines, an S-plasty was deemed most appropriate for repair. Using a sterile surgical marker, the appropriate outline of the S-plasty was drawn, incorporating the defect and placing the expected incisions within the relaxed skin tension lines where possible. The area thus outlined was incised deep to adipose tissue with a #15 scalpel blade. The skin margins were undermined to an appropriate distance in all directions utilizing iris scissors. The skin flaps were advanced over the defect. The opposing margins were then approximated with interrupted buried subcutaneous sutures. Complex Repair And Rotation Flap Text: The defect edges were debeveled with a #15 scalpel blade. The primary defect was closed partially with a complex linear closure. Given the location of the remaining defect, shape of the defect and the proximity to free margins a rotation flap was deemed most appropriate for complete closure of the defect. Using a sterile surgical marker, an appropriate advancement flap was drawn incorporating the defect and placing the expected incisions within the relaxed skin tension lines where possible. The area thus outlined was incised deep to adipose tissue with a #15 scalpel blade. The skin margins were undermined to an appropriate distance in all directions utilizing iris scissors. Complex Repair And Single Advancement Flap Text: The defect edges were debeveled with a #15 scalpel blade. The primary defect was closed partially with a complex linear closure. Given the location of the remaining defect, shape of the defect and the proximity to free margins a single advancement flap was deemed most appropriate for complete closure of the defect. Using a sterile surgical marker, an appropriate advancement flap was drawn incorporating the defect and placing the expected incisions within the relaxed skin tension lines where possible. The area thus outlined was incised deep to adipose tissue with a #15 scalpel blade. The skin margins were undermined to an appropriate distance in all directions utilizing iris scissors. Deep Sutures: 4-0 Monocryl Epidermal Sutures: 4-0 Prolene Complex Repair And Ftsg Text: The defect edges were debeveled with a #15 scalpel blade. The primary defect was closed partially with a complex linear closure. Given the location of the defect, shape of the defect and the proximity to free margins a full thickness skin graft was deemed most appropriate to repair the remaining defect. The graft was trimmed to fit the size of the remaining defect. The graft was then placed in the primary defect, oriented appropriately, and sutured into place. Consent was obtained from the patient. The risks and benefits to therapy were discussed in detail. Specifically, the risks of infection, scarring, bleeding, prolonged wound healing, incomplete removal, allergy to anesthesia, nerve injury and recurrence were addressed. Prior to the procedure, the treatment site was clearly identified and confirmed by the patient. All components of Universal Protocol/PAUSE Rule completed. Complex Repair And Epidermal Autograft Text: The defect edges were debeveled with a #15 scalpel blade. The primary defect was closed partially with a complex linear closure. Given the location of the defect, shape of the defect and the proximity to free margins an epidermal autograft was deemed most appropriate to repair the remaining defect. The graft was trimmed to fit the size of the remaining defect. The graft was then placed in the primary defect, oriented appropriately, and sutured into place. O-T Plasty Text: The defect edges were debeveled with a #15 scalpel blade. Given the location of the defect, shape of the defect and the proximity to free margins an O-T plasty was deemed most appropriate. Using a sterile surgical marker, an appropriate O-T plasty was drawn incorporating the defect and placing the expected incisions within the relaxed skin tension lines where possible. The area thus outlined was incised deep to adipose tissue with a #15 scalpel blade. The skin margins were undermined to an appropriate distance in all directions utilizing iris scissors. Slit Excision Additional Text (Leave Blank If You Do Not Want): A linear line was drawn on the skin overlying the lesion. An incision was made slowly until the lesion was visualized. Once visualized, the lesion was removed with blunt dissection. Complex Repair And M Plasty Text: The defect edges were debeveled with a #15 scalpel blade. The primary defect was closed partially with a complex linear closure. Given the location of the remaining defect, shape of the defect and the proximity to free margins an M plasty was deemed most appropriate for complete closure of the defect. Using a sterile surgical marker, an appropriate advancement flap was drawn incorporating the defect and placing the expected incisions within the relaxed skin tension lines where possible. The area thus outlined was incised deep to adipose tissue with a #15 scalpel blade. The skin margins were undermined to an appropriate distance in all directions utilizing iris scissors. Crescentic Advancement Flap Text: The defect edges were debeveled with a #15 scalpel blade. Given the location of the defect and the proximity to free margins a crescentic advancement flap was deemed most appropriate. Using a sterile surgical marker, the appropriate advancement flap was drawn incorporating the defect and placing the expected incisions within the relaxed skin tension lines where possible. The area thus outlined was incised deep to adipose tissue with a #15 scalpel blade. The skin margins were undermined to an appropriate distance in all directions utilizing iris scissors. Advancement Flap (Double) Text: The defect edges were debeveled with a #15 scalpel blade. Given the location of the defect and the proximity to free margins a double advancement flap was deemed most appropriate. Using a sterile surgical marker, the appropriate advancement flaps were drawn incorporating the defect and placing the expected incisions within the relaxed skin tension lines where possible. The area thus outlined was incised deep to adipose tissue with a #15 scalpel blade. The skin margins were undermined to an appropriate distance in all directions utilizing iris scissors. Rotation Flap Text: The defect edges were debeveled with a #15 scalpel blade. Given the location of the defect, shape of the defect and the proximity to free margins a rotation flap was deemed most appropriate. Using a sterile surgical marker, an appropriate rotation flap was drawn incorporating the defect and placing the expected incisions within the relaxed skin tension lines where possible. The area thus outlined was incised deep to adipose tissue with a #15 scalpel blade. The skin margins were undermined to an appropriate distance in all directions utilizing iris scissors. V-Y Flap Text: The defect edges were debeveled with a #15 scalpel blade. Given the location of the defect, shape of the defect and the proximity to free margins a V-Y flap was deemed most appropriate. Using a sterile surgical marker, an appropriate advancement flap was drawn incorporating the defect and placing the expected incisions within the relaxed skin tension lines where possible. The area thus outlined was incised deep to adipose tissue with a #15 scalpel blade. The skin margins were undermined to an appropriate distance in all directions utilizing iris scissors. Trilobed Flap Text: The defect edges were debeveled with a #15 scalpel blade. Given the location of the defect and the proximity to free margins a trilobed flap was deemed most appropriate. Using a sterile surgical marker, an appropriate trilobed flap drawn around the defect. The area thus outlined was incised deep to adipose tissue with a #15 scalpel blade. The skin margins were undermined to an appropriate distance in all directions utilizing iris scissors. Complex Repair And Split-Thickness Skin Graft Text: The defect edges were debeveled with a #15 scalpel blade. The primary defect was closed partially with a complex linear closure. Given the location of the defect, shape of the defect and the proximity to free margins a split thickness skin graft was deemed most appropriate to repair the remaining defect. The graft was trimmed to fit the size of the remaining defect. The graft was then placed in the primary defect, oriented appropriately, and sutured into place. Undermining Location (Optional): in the deep fat Intermediate Repair Preamble Text (Leave Blank If You Do Not Want): Undermining was performed with blunt dissection. Muscle Hinge Flap Text: The defect edges were debeveled with a #15 scalpel blade. Given the size, depth and location of the defect and the proximity to free margins a muscle hinge flap was deemed most appropriate. Using a sterile surgical marker, an appropriate hinge flap was drawn incorporating the defect. The area thus outlined was incised with a #15 scalpel blade. The skin margins were undermined to an appropriate distance in all directions utilizing iris scissors. Wound Care: Vaseline

## 2025-07-29 ENCOUNTER — APPOINTMENT (OUTPATIENT)
Dept: GENERAL RADIOLOGY | Age: 50
DRG: 101 | End: 2025-07-29
Payer: MEDICARE

## 2025-07-29 ENCOUNTER — APPOINTMENT (OUTPATIENT)
Dept: MRI IMAGING | Age: 50
DRG: 101 | End: 2025-07-29
Payer: MEDICARE

## 2025-07-29 VITALS
RESPIRATION RATE: 14 BRPM | TEMPERATURE: 97.5 F | HEIGHT: 71 IN | WEIGHT: 205 LBS | HEART RATE: 76 BPM | BODY MASS INDEX: 28.7 KG/M2 | SYSTOLIC BLOOD PRESSURE: 147 MMHG | DIASTOLIC BLOOD PRESSURE: 98 MMHG | OXYGEN SATURATION: 99 %

## 2025-07-29 PROBLEM — H55.00 NYSTAGMUS: Status: ACTIVE | Noted: 2025-07-29

## 2025-07-29 PROBLEM — Z86.39: Status: ACTIVE | Noted: 2025-07-29

## 2025-07-29 PROBLEM — R20.2 NUMBNESS AND TINGLING: Status: ACTIVE | Noted: 2025-07-29

## 2025-07-29 PROBLEM — Z86.59 HISTORY OF DEMENTIA: Status: ACTIVE | Noted: 2025-07-29

## 2025-07-29 PROBLEM — R20.0 NUMBNESS AND TINGLING: Status: ACTIVE | Noted: 2025-07-29

## 2025-07-29 PROBLEM — R27.0 ATAXIA: Status: ACTIVE | Noted: 2025-07-29

## 2025-07-29 PROBLEM — R29.898 WEAKNESS OF BOTH HANDS: Status: ACTIVE | Noted: 2025-07-29

## 2025-07-29 LAB
ANION GAP SERPL CALCULATED.3IONS-SCNC: 10 MMOL/L (ref 3–16)
BASE EXCESS BLDV CALC-SCNC: -0.8 MMOL/L (ref -3–3)
BASOPHILS # BLD: 0 K/UL (ref 0–0.2)
BASOPHILS NFR BLD: 0.6 %
BUN SERPL-MCNC: 12 MG/DL (ref 7–20)
CALCIUM SERPL-MCNC: 8.7 MG/DL (ref 8.3–10.6)
CHLORIDE SERPL-SCNC: 107 MMOL/L (ref 99–110)
CO2 BLDV-SCNC: 24 MMOL/L
CO2 SERPL-SCNC: 21 MMOL/L (ref 21–32)
COHGB MFR BLDV: 4 % (ref 0–1.5)
CREAT SERPL-MCNC: 1 MG/DL (ref 0.9–1.3)
DEPRECATED RDW RBC AUTO: 17.6 % (ref 12.4–15.4)
EOSINOPHIL # BLD: 0.2 K/UL (ref 0–0.6)
EOSINOPHIL NFR BLD: 3 %
FOLATE SERPL-MCNC: >40 NG/ML (ref 4.78–24.2)
GFR SERPLBLD CREATININE-BSD FMLA CKD-EPI: >90 ML/MIN/{1.73_M2}
GLUCOSE SERPL-MCNC: 95 MG/DL (ref 70–99)
HCO3 BLDV-SCNC: 22.8 MMOL/L (ref 23–29)
HCT VFR BLD AUTO: 37 % (ref 40.5–52.5)
HGB BLD-MCNC: 12.2 G/DL (ref 13.5–17.5)
LYMPHOCYTES # BLD: 2.4 K/UL (ref 1–5.1)
LYMPHOCYTES NFR BLD: 33 %
MCH RBC QN AUTO: 29.3 PG (ref 26–34)
MCHC RBC AUTO-ENTMCNC: 32.9 G/DL (ref 31–36)
MCV RBC AUTO: 89.1 FL (ref 80–100)
METHGB MFR BLDV: 0.3 %
MONOCYTES # BLD: 0.5 K/UL (ref 0–1.3)
MONOCYTES NFR BLD: 7.1 %
NEUTROPHILS # BLD: 4 K/UL (ref 1.7–7.7)
NEUTROPHILS NFR BLD: 56.3 %
O2 CT VFR BLDV CALC: 17 VOL %
O2 THERAPY: ABNORMAL
PCO2 BLDV: 34.8 MMHG (ref 40–50)
PH BLDV: 7.43 [PH] (ref 7.35–7.45)
PLATELET # BLD AUTO: 237 K/UL (ref 135–450)
PMV BLD AUTO: 9 FL (ref 5–10.5)
PO2 BLDV: 52.6 MMHG (ref 25–40)
POTASSIUM SERPL-SCNC: 3.9 MMOL/L (ref 3.5–5.1)
RBC # BLD AUTO: 4.15 M/UL (ref 4.2–5.9)
SAO2 % BLDV: 89 %
SODIUM SERPL-SCNC: 138 MMOL/L (ref 136–145)
VIT B12 SERPL-MCNC: 821 PG/ML (ref 211–911)
WBC # BLD AUTO: 7.2 K/UL (ref 4–11)

## 2025-07-29 PROCEDURE — 73562 X-RAY EXAM OF KNEE 3: CPT

## 2025-07-29 PROCEDURE — 82803 BLOOD GASES ANY COMBINATION: CPT

## 2025-07-29 PROCEDURE — 95816 EEG AWAKE AND DROWSY: CPT | Performed by: PSYCHIATRY & NEUROLOGY

## 2025-07-29 PROCEDURE — 97116 GAIT TRAINING THERAPY: CPT

## 2025-07-29 PROCEDURE — 80048 BASIC METABOLIC PNL TOTAL CA: CPT

## 2025-07-29 PROCEDURE — 84155 ASSAY OF PROTEIN SERUM: CPT

## 2025-07-29 PROCEDURE — 99223 1ST HOSP IP/OBS HIGH 75: CPT | Performed by: STUDENT IN AN ORGANIZED HEALTH CARE EDUCATION/TRAINING PROGRAM

## 2025-07-29 PROCEDURE — 83036 HEMOGLOBIN GLYCOSYLATED A1C: CPT

## 2025-07-29 PROCEDURE — 2500000003 HC RX 250 WO HCPCS

## 2025-07-29 PROCEDURE — 6370000000 HC RX 637 (ALT 250 FOR IP)

## 2025-07-29 PROCEDURE — 6360000004 HC RX CONTRAST MEDICATION

## 2025-07-29 PROCEDURE — 6370000000 HC RX 637 (ALT 250 FOR IP): Performed by: STUDENT IN AN ORGANIZED HEALTH CARE EDUCATION/TRAINING PROGRAM

## 2025-07-29 PROCEDURE — 84165 PROTEIN E-PHORESIS SERUM: CPT

## 2025-07-29 PROCEDURE — 99239 HOSP IP/OBS DSCHRG MGMT >30: CPT | Performed by: INTERNAL MEDICINE

## 2025-07-29 PROCEDURE — 70553 MRI BRAIN STEM W/O & W/DYE: CPT

## 2025-07-29 PROCEDURE — 97530 THERAPEUTIC ACTIVITIES: CPT

## 2025-07-29 PROCEDURE — 97535 SELF CARE MNGMENT TRAINING: CPT

## 2025-07-29 PROCEDURE — 85025 COMPLETE CBC W/AUTO DIFF WBC: CPT

## 2025-07-29 PROCEDURE — 97162 PT EVAL MOD COMPLEX 30 MIN: CPT

## 2025-07-29 PROCEDURE — 36415 COLL VENOUS BLD VENIPUNCTURE: CPT

## 2025-07-29 PROCEDURE — 6360000002 HC RX W HCPCS

## 2025-07-29 PROCEDURE — 97166 OT EVAL MOD COMPLEX 45 MIN: CPT

## 2025-07-29 PROCEDURE — 92610 EVALUATE SWALLOWING FUNCTION: CPT

## 2025-07-29 PROCEDURE — A9579 GAD-BASE MR CONTRAST NOS,1ML: HCPCS

## 2025-07-29 PROCEDURE — G0378 HOSPITAL OBSERVATION PER HR: HCPCS

## 2025-07-29 PROCEDURE — 94640 AIRWAY INHALATION TREATMENT: CPT

## 2025-07-29 PROCEDURE — 95816 EEG AWAKE AND DROWSY: CPT

## 2025-07-29 PROCEDURE — 96372 THER/PROPH/DIAG INJ SC/IM: CPT

## 2025-07-29 PROCEDURE — APPSS45 APP SPLIT SHARED TIME 31-45 MINUTES

## 2025-07-29 PROCEDURE — 94761 N-INVAS EAR/PLS OXIMETRY MLT: CPT

## 2025-07-29 RX ORDER — LEVETIRACETAM 500 MG/1
500 TABLET ORAL 2 TIMES DAILY
Qty: 60 TABLET | Refills: 0 | Status: SHIPPED | OUTPATIENT
Start: 2025-07-29 | End: 2025-08-28

## 2025-07-29 RX ORDER — LEVETIRACETAM 500 MG/1
500 TABLET ORAL 2 TIMES DAILY
Status: DISCONTINUED | OUTPATIENT
Start: 2025-07-29 | End: 2025-07-29 | Stop reason: HOSPADM

## 2025-07-29 RX ORDER — LEVETIRACETAM 500 MG/1
500 TABLET ORAL 2 TIMES DAILY
Qty: 60 TABLET | Refills: 5 | Status: SHIPPED | OUTPATIENT
Start: 2025-07-29 | End: 2026-01-25

## 2025-07-29 RX ORDER — GADOTERIDOL 279.3 MG/ML
19 INJECTION INTRAVENOUS
Status: COMPLETED | OUTPATIENT
Start: 2025-07-29 | End: 2025-07-29

## 2025-07-29 RX ORDER — OXYCODONE AND ACETAMINOPHEN 7.5; 325 MG/1; MG/1
1 TABLET ORAL EVERY 6 HOURS PRN
COMMUNITY

## 2025-07-29 RX ORDER — INDOMETHACIN 50 MG/1
50 CAPSULE ORAL 2 TIMES DAILY PRN
COMMUNITY
Start: 2025-07-29

## 2025-07-29 RX ORDER — OXYCODONE AND ACETAMINOPHEN 7.5; 325 MG/1; MG/1
1 TABLET ORAL EVERY 6 HOURS PRN
Refills: 0 | Status: DISCONTINUED | OUTPATIENT
Start: 2025-07-29 | End: 2025-07-29 | Stop reason: HOSPADM

## 2025-07-29 RX ADMIN — PANTOPRAZOLE SODIUM 40 MG: 40 TABLET, DELAYED RELEASE ORAL at 05:14

## 2025-07-29 RX ADMIN — MEMANTINE HYDROCHLORIDE 10 MG: 5 TABLET ORAL at 08:19

## 2025-07-29 RX ADMIN — FUROSEMIDE 20 MG: 20 TABLET ORAL at 16:55

## 2025-07-29 RX ADMIN — Medication 1000 MCG: at 08:18

## 2025-07-29 RX ADMIN — BETHANECHOL CHLORIDE 25 MG: 25 TABLET ORAL at 08:38

## 2025-07-29 RX ADMIN — NYSTATIN 500000 UNITS: 100000 SUSPENSION ORAL at 08:21

## 2025-07-29 RX ADMIN — THERA TABS 1 TABLET: TAB at 08:19

## 2025-07-29 RX ADMIN — GABAPENTIN 400 MG: 400 CAPSULE ORAL at 13:41

## 2025-07-29 RX ADMIN — Medication 10 ML: at 08:21

## 2025-07-29 RX ADMIN — Medication 100 MG: at 08:21

## 2025-07-29 RX ADMIN — CLOPIDOGREL BISULFATE 75 MG: 75 TABLET, FILM COATED ORAL at 08:20

## 2025-07-29 RX ADMIN — TIOTROPIUM BROMIDE AND OLODATEROL 2 PUFF: 3.124; 2.736 SPRAY, METERED RESPIRATORY (INHALATION) at 08:06

## 2025-07-29 RX ADMIN — BACLOFEN 10 MG: 10 TABLET ORAL at 08:20

## 2025-07-29 RX ADMIN — NYSTATIN 500000 UNITS: 100000 SUSPENSION ORAL at 16:56

## 2025-07-29 RX ADMIN — ATORVASTATIN CALCIUM 80 MG: 40 TABLET, FILM COATED ORAL at 08:19

## 2025-07-29 RX ADMIN — ALLOPURINOL 600 MG: 300 TABLET ORAL at 08:20

## 2025-07-29 RX ADMIN — FOLIC ACID 1 MG: 1 TABLET ORAL at 08:19

## 2025-07-29 RX ADMIN — TAMSULOSIN HYDROCHLORIDE 0.4 MG: 0.4 CAPSULE ORAL at 08:20

## 2025-07-29 RX ADMIN — OXYCODONE HYDROCHLORIDE AND ACETAMINOPHEN 1 TABLET: 7.5; 325 TABLET ORAL at 06:50

## 2025-07-29 RX ADMIN — DOCUSATE SODIUM 100 MG: 100 CAPSULE, LIQUID FILLED ORAL at 08:20

## 2025-07-29 RX ADMIN — AMLODIPINE BESYLATE 2.5 MG: 2.5 TABLET ORAL at 08:19

## 2025-07-29 RX ADMIN — FUROSEMIDE 20 MG: 20 TABLET ORAL at 05:14

## 2025-07-29 RX ADMIN — GADOTERIDOL 19 ML: 279.3 INJECTION, SOLUTION INTRAVENOUS at 13:27

## 2025-07-29 RX ADMIN — OXYCODONE HYDROCHLORIDE AND ACETAMINOPHEN 1 TABLET: 7.5; 325 TABLET ORAL at 00:52

## 2025-07-29 RX ADMIN — VENLAFAXINE HYDROCHLORIDE 150 MG: 75 CAPSULE, EXTENDED RELEASE ORAL at 08:19

## 2025-07-29 RX ADMIN — BACLOFEN 10 MG: 10 TABLET ORAL at 13:41

## 2025-07-29 RX ADMIN — GABAPENTIN 400 MG: 400 CAPSULE ORAL at 08:20

## 2025-07-29 RX ADMIN — LEVOTHYROXINE SODIUM 50 MCG: 0.05 TABLET ORAL at 05:14

## 2025-07-29 RX ADMIN — NYSTATIN 500000 UNITS: 100000 SUSPENSION ORAL at 13:41

## 2025-07-29 RX ADMIN — ENOXAPARIN SODIUM 40 MG: 100 INJECTION SUBCUTANEOUS at 08:21

## 2025-07-29 ASSESSMENT — PAIN DESCRIPTION - PAIN TYPE: TYPE: CHRONIC PAIN

## 2025-07-29 ASSESSMENT — PAIN DESCRIPTION - ORIENTATION
ORIENTATION: UPPER;LOWER;RIGHT
ORIENTATION: MID;LOWER;UPPER

## 2025-07-29 ASSESSMENT — PAIN DESCRIPTION - ONSET: ONSET: ON-GOING

## 2025-07-29 ASSESSMENT — PAIN - FUNCTIONAL ASSESSMENT
PAIN_FUNCTIONAL_ASSESSMENT: ACTIVITIES ARE NOT PREVENTED
PAIN_FUNCTIONAL_ASSESSMENT: ACTIVITIES ARE NOT PREVENTED

## 2025-07-29 ASSESSMENT — PAIN SCALES - GENERAL
PAINLEVEL_OUTOF10: 0
PAINLEVEL_OUTOF10: 0
PAINLEVEL_OUTOF10: 10
PAINLEVEL_OUTOF10: 8
PAINLEVEL_OUTOF10: 0

## 2025-07-29 ASSESSMENT — PAIN DESCRIPTION - DESCRIPTORS
DESCRIPTORS: STABBING;SHOOTING;SHARP
DESCRIPTORS: STABBING;SHOOTING

## 2025-07-29 ASSESSMENT — PAIN DESCRIPTION - LOCATION
LOCATION: BACK;NECK;KNEE
LOCATION: BACK

## 2025-07-29 ASSESSMENT — PAIN SCALES - WONG BAKER: WONGBAKER_NUMERICALRESPONSE: NO HURT

## 2025-07-29 ASSESSMENT — PAIN DESCRIPTION - FREQUENCY: FREQUENCY: CONTINUOUS

## 2025-07-29 NOTE — PROGRESS NOTES
Patient provided a COPD Educational Folder that includes the following materials:     [x]  [employer] Booklet: Managing your COPD  [x]  ALA: Getting the Most Out of Medication Delivery Devices  [x]  ALA: My COPD Action Plan  [x]  [employer] [facility]: [facility]   [x]  Smoking Cessation Classes  [x]  Outpatient Spiritual Care Services  [x]  Magnet: Signs of COPD    PATIENT/CAREGIVER TEACHING:   Level of patient/caregiver understanding able to:   [x] Verbalize understanding   [] Demonstrate understanding       [] Teach back        [] Needs reinforcement     []  Other:     Electronically signed by Maria A Martinez RN on 7/29/2025 at 1:54 AM

## 2025-07-29 NOTE — PROGRESS NOTES
Inpatient Physical Therapy Evaluation & Treatment    Unit: [facility]  Date:  7/29/2025  Patient Name:    Osvaldo Noble  Admitting diagnosis:  Seizure (HCC) [R56.9]  Convulsions, unspecified convulsion type (HCC) [R56.9]  Admit Date:  7/28/2025  Precautions/Restrictions/WB Status/ Lines/ Wounds/ Oxygen: Fall risk, Bed/chair alarm, Lines (IV), and Telemetry; seizure precautions      Pt seen for cotreatment this date due to patient safety, complexity of condition, acute illness/injury, and limited functional status information    Treatment Time:  4066-0260  Treatment Number:  1   Timed Code Treatment Minutes: 38 minutes  Total Treatment Minutes:  48  minutes    Patient Stated Goals for Therapy: \" To get better. \"          Discharge Recommendations: ARU/IRF (inpatient rehab facility)   DME needs for discharge: Defer to facility       Therapy recommendation for EMS Transport: can transport by wheelchair    Therapy recommendations for staff:   Assist of 1 for ambulation with use of rolling walker (RW) and gait belt to/from BSC  to/from chair  to/from bathroom  within room    History of Present Illness:   Per H&P 7/28/25 from Naheed Tavares PA-C:  \"49 y.o. male with PMHx of COPD, HTN, hyperlipidemia, CAD status post PCI, PTSD, GERD, cervical spine surgery  who presents to [facility] for jerking/ tremor episodes. He states that he was outside on his porch on Saturday and had an episode where he \"blacked out\" and woke up on the ground. He has had multiple similar episodes recently, including this past Tuesday where he had jerking movements in front of his caregiver, passed out, and split his chin open. He has had episodes like this for a while, but he did not use to \"black out\" during them- he used to be able to remember starting to jerk and collapsing to the ground, until the past month or so. He is not sure if he has a prodrome, but states that he usually feels dizzy and has \"starry vision.\" He does usually wake up

## 2025-07-29 NOTE — PROGRESS NOTES
Resting in bed awake. NO complaints or needs at present time. Am assessment complete. Alert and oriented. Bed alarm in place and turned on. Call light in reach.     Bedside Mobility Assessment Tool (BMAT):     Assessment Level 1- Sit and Shake    1. From a semi-reclined position, ask patient to sit up and rotate to a seated position at the side of the bed. Can use the bedrail.    2. Ask patient to reach out and grab your hand and shake making sure patient reaches across his/her midline.   Pass- Patient is able to come to a seated position, maintain core strength. Maintains seated balance while reaching across midline. Move on to Assessment Level 2.     Assessment Level 2- Stretch and Point   1. With patient in seated position at the side of the bed, have patient place both feet on the floor (or stool) with knees no higher than hips.    2. Ask patient to stretch one leg and straighten the knee, then bend the ankle/flex and point the toes. If appropriate, repeat with the other leg.   Pass- Patient is able to demonstrate appropriate quad strength on intended weight bearing limb(s). Move onto Assessment Level 3.     Assessment Level 3- Stand   1. Ask patient to elevate off the bed or chair (seated to standing) using an assistive device (cane, bedrail).    2. Patient should be able to raise buttocks off be and hold for a count of five. May repeat once.   Pass- Patient maintains standing stability for at least 5 seconds, proceed to assessment level 4.    Assessment Level 4- Walk   1. Ask patient to march in place at bedside.    2. Then ask patient to advance step and return each foot. Some medical conditions may render a patient from stepping backwards, use your best clinical judgement.   Pass- Patient demonstrates balance while shifting weight and ability to step, takes independent steps, does not use assistive device patient is MOBILITY LEVEL 4.      Mobility Level- 4

## 2025-07-29 NOTE — PROGRESS NOTES
Speech Language Pathology  Clinical Bedside Swallow Assessment and Discharge Note  Facility/Department: [address] MEDICAL-SURGICAL     services are not warranted at this time. Please re consult if any changes occur or concerns arise.       Recommendations:  Diet recommendation: IDDSI 7 Regular Solids; IDDSI 0 Thin Liquids; Meds whole with thin liquids  Instrumentation: Not clinically indicated at this time  Risk management: upright for all intake, stay upright for at least 30 mins after intake, general GERD precautions, and general aspiration precautions      NAME:Osvaldo Noble  : 1975 (49 y.o.)   MRN: [id number]  ROOM: [facility]7/0227-01  ADMISSION DATE: 2025  PATIENT DIAGNOSIS(ES): Seizure (HCC) [R56.9]  Convulsions, unspecified convulsion type (HCC) [R56.9]  Chief Complaint   Patient presents with    Seizures     Pt states he has been having seizures for  awhile.  Not sure if that is actually what is going on.  Daughter states she has seen them and he jerks and falls.  Pt states he does jerk and fall but remembers the events.  Does state he had an event on Saturday that he does not remember.     Patient Active Problem List    Diagnosis Date Noted    Seizure (HCC) 2025    RLS (restless legs syndrome) 2025    Hypoxemia 2024    Neck pain 06/15/2024    COPD exacerbation (HCC) 2024    Moderate dementia (HCC) 2024    Acute hypoxic respiratory failure (HCC) 2024    Metabolic encephalopathy 2023    Pneumonia due to infectious organism 2023    Contusion of right foot 2023    Displaced fracture of fifth metatarsal bone, left foot, initial encounter for closed fracture 2023    GERD (gastroesophageal reflux disease) 2021    Nasal congestion 2021    Restrictive lung disease 2021    Chronic bronchitis (HCC) 2021    JOHNATHAN on CPAP 2021    KJ (acute kidney injury) 2021    Marijuana use 2021    SOB (shortness of

## 2025-07-29 NOTE — PLAN OF CARE
Problem: SLP Adult - Impaired Swallowing  Goal: By Discharge: Advance to least restrictive diet without signs or symptoms of aspiration for planned discharge setting.  See evaluation for individualized goals.  Outcome: Completed     SLP completed evaluation. Please refer to notes in EMR. ST services are not warranted at this time. Please re consult if any changes occur or concerns arise.     Helen Guo M.A., CCC-SLP  Speech-Language Pathologist  [phone]

## 2025-07-29 NOTE — CARE COORDINATION
Case Management Assessment  Initial Evaluation    Date/Time of Evaluation: 7/29/2025 10:25 AM  Assessment Completed by: David Davila RN    If patient is discharged prior to next notation, then this note serves as note for discharge by case management.    Patient Name: Osvaldo Noble                   YOB: 1975  Diagnosis: Seizure (HCC) [R56.9]  Convulsions, unspecified convulsion type (HCC) [R56.9]                   Date / Time: 7/28/2025 10:56 AM    Patient Admission Status: Inpatient   Readmission Risk (Low < 19, Mod (19-27), High > 27): Readmission Risk Score: 12.6    Current PCP: Leo Howard, DO  PCP verified by CM? Yes ([facility])    Chart Reviewed: Yes      History Provided by: Patient  Patient Orientation: Alert and Oriented    Patient Cognition: Alert    Hospitalization in the last 30 days (Readmission):  No    If yes, Readmission Assessment in CM Navigator will be completed.    Advance Directives:      Code Status: Full Code   Patient's Primary Decision Maker is: Named in Scanned ACP Document    Primary Decision Maker: MaxginaBriseida - Spouse - [phone]    Primary Decision Maker: AideEdgardo - [phone]    Discharge Planning:    Patient lives with: Alone Type of Home: Apartment  Primary Care Giver: Self  Patient Support Systems include: Spouse/Significant Other   Current Financial resources: Medicaid, Medicare  Current community resources: ECF/Home Care  Current services prior to admission: Durable Medical Equipment, Oxygen Therapy, Home Care (HHA thru [facility])            Current DME: Walker, Wheelchair, Oxygen Therapy (Comment) (2L PRN/HS thru [facility])            Type of Home Care services:  None    ADLS  Prior functional level: Assistance with the following:, Cooking, Housework, Shopping, Mobility  Current functional level: Assistance with the following:, Cooking, Shopping, Mobility, Housework    PT AM-PAC:   /24  OT AM-PAC:    /24    Family can provide assistance at DC: No  Would you like Case Management to discuss the discharge plan with any other family members/significant others, and if so, who? No  Plans to Return to Present Housing: Yes  Other Identified Issues/Barriers to RETURNING to current housing: na    Potential Assistance needed at discharge: N/A            Potential DME:    Patient expects to discharge to: Apartment  Plan for transportation at discharge: Family    Financial    Payor: [employer]HA BOWL[place] MEDICARE / Plan: [place] MEDICARE / Product Type: *No Product type* /     Does insurance require precert for SNF: Yes    Potential assistance Purchasing Medications: No  Meds-to-Beds request: Yes      [facility] [address] [facility] [phone] - F [phone]  [address] [facility] [address]  Phone: [phone] Fax: [phone]    [id number] - [place], [place] - [address] [facility] [phone] - F [phone]  [address] [facility] [address]  Phone: [phone] Fax: [phone]      Notes:    Factors facilitating achievement of predicted outcomes: Family support, Cooperative, and Pleasant    Barriers to discharge: seizure    Additional Case Management Notes: Reviewed chart. Met with pt at bedside. Role of cm explained. Lives alone in apt first floor. Pt spouses recently moved out but still is involved in his care. Pt has a walker and wc but does not always use as he is supposed to. Pt has suffered from back issues since a young boy. Pt has had back surgery but reports his symptoms have only worsened. Pt has left sided weakness and now is developing right sided weakness. Pt did not have any therapy after his back surgery. Pt reports he has oxygen thru [facility] but does not use. Per pt his order is for 2L PRN and HS. Pt currently on RA. Pt PCP is Rehana Baeza out of [facility]. Pt has HHA 5 days per week from 9-2 thru [facility]. Watch PT/OT rec.      The Plan for

## 2025-07-29 NOTE — PROGRESS NOTES
D/c instructions given to pt with new prescriptions delivered to bedside. Explanation of new medication given. Verbalized understanding. Pt getting dressed and calling for a ride home. Call light in reach. Chair alarm in place and turned on.

## 2025-07-29 NOTE — PLAN OF CARE
Problem: Discharge Planning  Goal: Discharge to home or other facility with appropriate resources  7/29/2025 0153 by Maria A Martinez RN  Outcome: Progressing  7/28/2025 2009 by Maria A Martinez RN  Outcome: Progressing  Flowsheets  Taken 7/28/2025 2006  Discharge to home or other facility with appropriate resources: Identify barriers to discharge with patient and caregiver  Taken 7/28/2025 1946  Discharge to home or other facility with appropriate resources: Identify barriers to discharge with patient and caregiver     Problem: Seizure Precautions  Goal: Remains free of injury related to seizures activity  7/29/2025 0153 by Maria A Martinez RN  Outcome: Progressing  7/28/2025 2009 by Maria A Martinez RN  Outcome: Progressing     Problem: Pain  Goal: Verbalizes/displays adequate comfort level or baseline comfort level  7/29/2025 0153 by Maria A Martinez RN  Outcome: Progressing  Flowsheets (Taken 7/28/2025 2014)  Verbalizes/displays adequate comfort level or baseline comfort level: Encourage patient to monitor pain and request assistance  7/28/2025 2009 by Maria A Martinez RN  Outcome: Progressing  Flowsheets (Taken 7/28/2025 1845)  Verbalizes/displays adequate comfort level or baseline comfort level: Encourage patient to monitor pain and request assistance

## 2025-07-29 NOTE — PROGRESS NOTES
PRN percocet given at this time for pain. P.t. tolerated medication administration and denies further needs at this time.

## 2025-07-29 NOTE — PROGRESS NOTES
Transferred care to RAISA Hui. Face to face bedside report given, no need voiced at this time. Pt in bed with eyes closed.  No signs of distress noted.  Call light within reach.   Denies needs.

## 2025-07-29 NOTE — PROCEDURES
Patient: Osvaldo Noble    MR Number: [id number]  YOB: 1975  Date of Visit: 7/29/2025    Clinical History:  The patient is a 49 y.o. years old male with recurrent spells of possible seizures.  Medications reviewed.      Method:  The EEG was performed utilizing the international 10/20 of electrode placements of both referential and bipolar montages. The patient was awake and drowsy through out the recording.  Photic stimulation was performed.    Findings:  The background of the EEG showed normal alpha posterior background of 9-10  HZ and amplitude of 20-40 UV. This background was symmetric, waxing and waning, and reactive with eye opening and closure. As the patient became drowsy, generalized diffuse slowing was seen through recording at 6-7 HZ.  This generalized slowing was symmetric, non rhythmical, and continuous. No spike or sharp waves were seen.  Photic stimulation did not activate EEG.     Impression:  This EEG  is within normal limits. There is no evidence of epileptiform discharges, focal, or lateralizing abnormalities.      Tatiana Baird MD      Board certified in clinical neurophysiology

## 2025-07-29 NOTE — PROGRESS NOTES
07/28/25 2106   RT Protocol   History Pulmonary Disease 2   Respiratory pattern 0   Breath sounds 0   Cough 0   Indications for Bronchodilator Therapy None   Bronchodilator Assessment Score 2     RT Inhaler-Nebulizer Bronchodilator Protocol Note    There is a bronchodilator order in the chart from a provider indicating to follow the RT Bronchodilator Protocol and there is an “Initiate RT Inhaler-Nebulizer Bronchodilator Protocol” order as well (see protocol at bottom of note).    CXR Findings:  XR CHEST PORTABLE  Result Date: 7/28/2025  No acute process.       The findings from the last RT Protocol Assessment were as follows:   History Pulmonary Disease: Chronic pulmonary disease  Respiratory Pattern: Regular pattern and RR 12-20 bpm  Breath Sounds: Clear breath sounds  Cough: Strong, spontaneous, non-productive  Indication for Bronchodilator Therapy: None  Bronchodilator Assessment Score: 2    Aerosolized bronchodilator medication orders have been revised according to the RT Inhaler-Nebulizer Bronchodilator Protocol below.    Respiratory Therapist to perform RT Therapy Protocol Assessment initially then follow the protocol.  Repeat RT Therapy Protocol Assessment PRN for score 0-3 or on second treatment, BID, and PRN for scores above 3.    No Indications - adjust the frequency to every 6 hours PRN wheezing or bronchospasm, if no treatments needed after 48 hours then discontinue using Per Protocol order mode.     If indication present, adjust the RT bronchodilator orders based on the Bronchodilator Assessment Score as indicated below.  Use Inhaler orders unless patient has one or more of the following: on home nebulizer, not able to hold breath for 10 seconds, is not alert and oriented, cannot activate and use MDI correctly, or respiratory rate 25 breaths per minute or more, then use the equivalent nebulizer order(s) with same Frequency and PRN reasons based on the score.  If a patient is on this medication at

## 2025-07-29 NOTE — DISCHARGE SUMMARY
Physician Discharge Summary     Patient ID:  Osvaldo Noble  [id number]  49 y.o.  1975    Admit date: 7/28/2025    Discharge date and time: No discharge date for patient encounter.     Admitting Physician: Manpreet Gray MD     Discharge Physician: Leighann Melara MD    Admission Diagnoses: Seizure (HCC) [R56.9]  Convulsions, unspecified convulsion type (HCC) [R56.9]    Discharge Diagnoses: Principal Problem:    Convulsions (HCC)  Active Problems:    Current smoker    Coronary artery disease    Anxiety and depression    GERD (gastroesophageal reflux disease)    COPD exacerbation (HCC)    RLS (restless legs syndrome)  Resolved Problems:    * No resolved hospital problems. *      Admission Condition: {condition:73653}    Discharged Condition: {condition:54709}    Indication for Admission: ***    Hospital Course: ***    ENT/PLAN:  #Seizures?  #Jerking movements.  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  -hx of diffuse tremors, typically conscious during, had 1 episode over the weekend where he ws unconscious.   -CT head without acute abnormality   -had EEG 4/8/2025 which was normal.   -seizure precautions  -prn valium for seizure activity   -ammonia wnl  -continued thiamine, B12,  Namenda, Razadyne.  -has seen Dr Wellington 3/2025- patient not taking donepezil due to SE.  -PT/OT  -neuro consulted    Patient seen by neurology symptoms concerning for seizure disorder started on Keppra I can discharge on the same EEG completed which was normal MRI brain completed.**   Patient to follow-up with neurology as an outpatient    Generalized weakness patient seen by PT OT initial recommendation was for referral to rehab however patient does not want to go to rehab he states he can manage at home and he did walk in the room and showed me    He prefers outpatient PT OT rehab  Referral given         #Dysphagia   -SLP eval      #Hx of Alcohol Abuse  -states he has not had an alcoholic drink over 5 years  -EtOH level: none  Flomax and bethanechol.     #RLS.  -continue home Requip.     #Mood disorder.  -continue home Effexor.      #Hypothyroidism.  -continue home synthroid.        Consults: neurology      Significant Diagnostic Studies:   Data:  CBC:   Recent Labs     07/28/25  1143 07/29/25  0534   WBC 9.9 7.2   RBC 4.16* 4.15*   HGB 12.3* 12.2*   HCT 36.9* 37.0*   MCV 88.6 89.1   RDW 17.7* 17.6*    237     BMP:   Recent Labs     07/28/25  1143 07/29/25  0534    138   K 4.0 3.9    107   CO2 22 21   BUN 15 12   CREATININE 1.3 1.0       FASTING LIPID PANEL:  Lab Results   Component Value Date    CHOL 184 06/13/2024    HDL 44 06/13/2024    TRIG 217 (H) 06/13/2024     LIVER PROFILE:   Recent Labs     07/28/25  1143   AST 19   ALT 14   BILITOT 0.3   ALKPHOS 101           MRI BRAIN W WO CONTRAST   Final Result   1. Normal MRI of the brain.            XR KNEE RIGHT (3 VIEWS)   Final Result   No fracture or dislocation.         CT CERVICAL SPINE WO CONTRAST   Final Result   No acute abnormality of the cervical spine.         CT HEAD WO CONTRAST   Final Result   No acute intracranial abnormality.         XR PELVIS (1-2 VIEWS)   Final Result   No acute abnormality of the pelvis.         XR CHEST PORTABLE   Final Result   No acute process.             Treatments: as above      Discharge Exam:    Vitals:    07/29/25 0809 07/29/25 0819 07/29/25 1329 07/29/25 1655   BP: 122/83 122/83 (!) 147/98 (!) 147/98   Pulse: 58  76    Resp: 14  14    Temp: 97.7 °F (36.5 °C)  97.5 °F (36.4 °C)    TempSrc: Oral  Oral    SpO2: 95%  99%    Weight:       Height:         Gen: No distress. Alert.   Eyes: PERRL. No sclera icterus. No conjunctival injection.   ENT: No discharge. Pharynx clear.   Neck: No JVD.  Trachea midline.  Resp: No accessory muscle use. No crackles. No wheezes. No rhonchi.   CV: Regular rate. Regular rhythm. No murmur.  No rub. No edema.   Capillary Refill: Brisk,< 3 seconds   Peripheral Pulses: +2 palpable, equal bilaterally

## 2025-07-29 NOTE — PLAN OF CARE
Problem: Discharge Planning  Goal: Discharge to home or other facility with appropriate resources  Outcome: Progressing  Flowsheets  Taken 7/28/2025 2006  Discharge to home or other facility with appropriate resources: Identify barriers to discharge with patient and caregiver  Taken 7/28/2025 1946  Discharge to home or other facility with appropriate resources: Identify barriers to discharge with patient and caregiver     Problem: Seizure Precautions  Goal: Remains free of injury related to seizures activity  Outcome: Progressing     Problem: Pain  Goal: Verbalizes/displays adequate comfort level or baseline comfort level  Outcome: Progressing  Flowsheets (Taken 7/28/2025 1845)  Verbalizes/displays adequate comfort level or baseline comfort level: Encourage patient to monitor pain and request assistance

## 2025-07-29 NOTE — CONSULTS
Inpatient Neurology consult        [facility] Neurology      Osvaldo [facility]  1975    Date of Service: 7/29/2025    Referring Physician: Leighann Melara MD      Reason for the consult and CC: Seizures    HPI:   The patient is a 49 y.o. male with a past medical history of anxiety, CAD, COPD, depression, panic disorder, PTSD, hyperlipidemia, insomnia, and dementia secondary to Wernicke's Korsakoff syndrome (sober for the last 5 years) originally presenting to the hospital for concerns of muscle jerking/tremor episodes.  Patient reports since cervical 5 through 7 laminectomy and fusion in February 2025, he he has had 5 episodes a week of increased falls.  He reports that with each episode his legs will give out from under him and he will fall and lay on the ground unable to speak or move for several hours.  He denies any loss of consciousness with said episodes in the past and does endorse occasional muscle jerking with episodes.  Patient states he has been able to recall the entire events until approximately 1 month ago when he started to lose consciousness with each episode. He reports on Saturday (7/27), he was on the porch smoking a cigarette when he had an episode where he \"blacked out and woke up on the ground 3 hours later with the furniture on the porch knocked over.  He then reports an additional episode on Tuesday (7/28), where his nursing aide observed him falling to the ground with generalized muscle jerking lasting several minutes.  He denies any loss of bladder or bowel or tongue biting during episodes.  He does report waking up slightly \"dazed\" for several minutes following episodes before returning back to baseline mentality.  Neurology has been consulted for further evaluation and management of possible seizure-like episodes.       Constitutional:   Vitals:    07/29/25 0808 07/29/25 0809 07/29/25 0819 07/29/25 1329   BP:  122/83 122/83 (!) 147/98   Pulse:  58  76   Resp:  14  14   Temp:   AM    LABGLOM >60 09/04/2023 04:50 AM    GLUCOSE 95 07/29/2025 05:34 AM    PHOS 2.2 01/01/2019 05:13 AM    MG 2.00 09/03/2023 04:59 AM    CALCIUM 8.7 07/29/2025 05:34 AM     Lab Results   Component Value Date/Time    WBC 7.2 07/29/2025 05:34 AM    RBC 4.15 07/29/2025 05:34 AM    HGB 12.2 07/29/2025 05:34 AM    HCT 37.0 07/29/2025 05:34 AM    MCV 89.1 07/29/2025 05:34 AM    RDW 17.6 07/29/2025 05:34 AM     07/29/2025 05:34 AM     Lab Results   Component Value Date    INR 1.10 09/03/2023    PROTIME 14.2 09/03/2023     IMAGING:     Head CT w/o Contrast:  No acute intracranial abnormality    CT cervical spine without contrast:  No acute abnormality of the cervical spine    MRI brain with and without contrast:  Normal MRI of the brain    Impression:  Seizure-like activity   Patient is alert and oriented in no acute distress.  No unilateral weakness or slurred speech.  No laceration or biting of tongue.  No further episodes since admission.  Patient underwent EEG in April 2025 for possible seizures-which was interpreted as normal with no epileptiform discharges.     Recommendation:  EEG pending  Continue thiamine and vitamin B12 supplementation   Seizure precautions  Continue memantine 10 mg twice daily (home)  Continue galantamine 4 mg twice daily (home)  Neurologist to follow with further recommendations      Thank you for referring such patient. If you have any questions regarding my consult note, please don't hesitate to call me.     Time spent with patient today 55 minutes    CATERINA Shine NP    ===    I saw and evaluated the patient. I agree with the findings and plan of care as documented in the NP’s note. Additionally, I have included further information about my assessment and plan below:     He reports that his birth was complicated by jaundice that required his mother to bring him back to the hospital. He has lifelong \"inner ear\" problems that he describes as imbalance and abnormal eye movements

## 2025-07-29 NOTE — PROGRESS NOTES
Inpatient Occupational Therapy Evaluation & Treatment    Unit: [address] [place]  Date:  7/29/2025  Patient Name:    Osvaldo Noble  Admitting diagnosis:  Seizure (HCC) [R56.9]  Convulsions, unspecified convulsion type (HCC) [R56.9]  Admit Date:  7/28/2025  Precautions/Restrictions/WB Status/ Lines/ Wounds/ Oxygen: Fall risk, Bed/chair alarm, Lines (IV), and Telemetry    Pt seen for cotreatment this date due to patient safety, patient endurance, acute illness/injury, and limited functional status information    Treatment Time:  0761-0556  Treatment Number:  1  Timed Code Treatment Minutes: 38 minutes  Total Treatment Minutes:  48  minutes    Patient Goals for Therapy: none stated          Discharge Recommendations: Acute Rehab (ARU)/Inpatient Rehab Facility (IRF)  DME needs for discharge: Defer to facility       Therapy recommendations for staff:   Assist of 1 for transfers with use of rolling walker (RW) and gait belt to/from chair  to/from bathroom    History of Present Illness: Per H&P 7/28/25 from Naheed Tavares PA-C:  \"49 y.o. male with PMHx of COPD, HTN, hyperlipidemia, CAD status post PCI, PTSD, GERD, cervical spine surgery  who presents to [facility] for jerking/ tremor episodes. He states that he was outside on his porch on Saturday and had an episode where he \"blacked out\" and woke up on the ground. He has had multiple similar episodes recently, including this past Tuesday where he had jerking movements in front of his caregiver, passed out, and split his chin open. He has had episodes like this for a while, but he did not use to \"black out\" during them- he used to be able to remember starting to jerk and collapsing to the ground, until the past month or so. He is not sure if he has a prodrome, but states that he usually feels dizzy and has \"starry vision.\" He does usually wake up somewhat confused after the episodes. He denies ever losing bowel or bladder control during the episodes. He also reports that he  balance in all directions)    Bed Mobility:   Supine to Sit:    SBA from HOB partially elevated  Sit to Supine:   Not Tested  Rolling:   Not Tested  Scooting in sitting: Independent  Scooting in supine:  Not Tested    Transfers:    Sit to stand:    CGA  Stand to sit:    CGA  Bed to chair:     CGA to min A with gait belt, no AD - pt refuses to use RW in spite of instruction in benefits of UE support to minimize effects of knees buckling  Bed/ chair to standard toilet:  CGA to min A with multiple losses of balance in all directions  Bed/chair to BSC:   Not Tested  Functional Mobility:   CGA to min A with multiple losses of balance in all directions    ADLs:  Dressing:      UE:   Not Tested  LE:    Supervision to thread LEs into pants, min A to maintain stance balance while pulling pants up to waist; independent don/doff socks while seated in recliner    Bathing:    UE:  Not Tested  LE:  Not Tested    Eating:   Independent    Toileting:  Independent urinary mgmt    Grooming/hygiene: Independent hand washing with CGA to min A stance balance at sink    Ther Ex / Activities Initiated:   N/A    Positioning Needs:   Pt reclined in chair and alarm set  Call light provided and all needs within reach    Patient/Family Education:   Pt educated on role of inpatient OT, plan of care, importance of continued activity, DC recommendations, functional transfer/mobility safety, transfer techniques, and calling for assist with mobility    CHF Education  N/A    Assessment:  Pt seen for occupational therapy evaluation in the acute care setting this date.  Pt demonstrated decreased Activity tolerance, ADLs, Balance , Bathing, Dressing, Safety Awareness, and Transfers. Pt's deficits in balance and endurance impact his ability to complete ADLs independently, particularly at standing level.  Pt reports multiple falls and episodes of LEs weakness/buckling.  Attempted to have pt utilize a RW to increase balance and safety, however pt refused.

## 2025-07-29 NOTE — ACP (ADVANCE CARE PLANNING)
Advance Care Planning     General Advance Care Planning (ACP) Conversation    Date of Conversation: 7/29/2025  Conducted with: Patient with Decision Making Capacity  Other persons present: None    Healthcare Decision Maker:   Primary Decision Maker: AideBriseida - Spouse - [phone]    Primary Decision Maker: AideEdgardo - [phone]       Content/Action Overview:  Has ACP document(s) on file - reflects the patient's care preferences  Reviewed DNR/DNI and patient elects Full Code (Attempt Resuscitation)        Length of Voluntary ACP Conversation in minutes:  <16 minutes (Non-Billable)    David Davila RN

## 2025-07-29 NOTE — DISCHARGE INSTRUCTIONS
Your information:  Name: Osvaldo Noble  : 1975    Your instructions:    Follow instructions below.    What to do after you leave the hospital:    Recommended diet: regular diet    Recommended activity: activity as tolerated        The following personal items were collected during your admission and were returned to you:    Belongings  Dental Appliances: Uppers, Lowers, At home  Vision - Corrective Lenses: Eyeglasses  Hearing Aid: None  Clothing: Shorts, Pants, Socks, Footwear, Hat  Jewelry: None  Electronic Devices: Cell Phone  Weapons (Notify Protective Services/Security): None  Other Valuables: Wallet  Home Medications: None  Valuables Given To: Family (Comment) (self)  Provide Name(s) of Who Valuable(s) Were Given To: Lucero Ayala    Information obtained by:  By signing below, I understand that if any problems occur once I leave the hospital I am to contact MD.  I understand and acknowledge receipt of the instructions indicated above.     SEIZURE FIRST AID  Here are some basic instructions for what to do if your loved one has a seizure:    Always stay with the person until the seizure is over.    Pay attention to how long the seizure lasts.    Stay calm. Most seizures only last a few minutes.    Prevent injury. By moving nearby objects out of the way.    Make the person as comfortable as possible.    Keep onlookers away.    Don't hold the person down.    Don't put anything in the person's mouth.    Don't give water, pills, or food by mouth unless the person is fully alert.    Make sure their breathing is okay.    Know when to call for emergency medical help. Call 911 in the following circumstances:    o A seizure lasts 5 minutes or longer   o One seizure happens right after another without the person regaining consciousness (“coming to”) between seizures   o Seizures happen closer together than usual for that person   o The person has trouble breathing   o The person appears to be choking   o The

## 2025-07-29 NOTE — PLAN OF CARE
Problem: Discharge Planning  Goal: Discharge to home or other facility with appropriate resources  7/29/2025 1055 by Yanelis Lugo RN  Outcome: Progressing  Flowsheets (Taken 7/29/2025 0809)  Discharge to home or other facility with appropriate resources: Identify barriers to discharge with patient and caregiver  7/29/2025 0153 by Maria A Martinez RN  Outcome: Progressing     Problem: Seizure Precautions  Goal: Remains free of injury related to seizures activity  7/29/2025 1055 by Yanelis Lugo RN  Outcome: Progressing  7/29/2025 0153 by Maria A Martinez RN  Outcome: Progressing     Problem: Pain  Goal: Verbalizes/displays adequate comfort level or baseline comfort level  7/29/2025 1055 by Yanelis Lugo RN  Outcome: Progressing  7/29/2025 0153 by Maria A Martinez RN  Outcome: Progressing  Flowsheets (Taken 7/28/2025 2014)  Verbalizes/displays adequate comfort level or baseline comfort level: Encourage patient to monitor pain and request assistance     Problem: Safety - Adult  Goal: Free from fall injury  Outcome: Progressing

## 2025-07-30 LAB
EST. AVERAGE GLUCOSE BLD GHB EST-MCNC: 134.1 MG/DL
HBA1C MFR BLD: 6.3 %

## 2025-07-31 NOTE — PROGRESS NOTES
Physician Progress Note      PATIENT:               FLAQUITO MCELROY  CSN #:                  [id number]  :                       1975  ADMIT DATE:       2025 10:56 AM  DISCH DATE:        2025 8:00 PM  RESPONDING  PROVIDER #:        Leighann SIERRA MD          QUERY TEXT:    BPH is documented in the medical record   by Leighann Ro MD.   Please specify the associated urinary conditions:    The clinical indicators include:  -Age 49 yrs M, BPH, presented with seizure.  ?  -\"BPH. continue Flomax and bethanechol.\" (  by Leighann Ro MD)  ?  -Treated?with tamsulosin (FLOMAX) capsule 0.4 mg- (from EPIC MAR )    Thank you.  Jose Juan Wilder [facility] CDS  Options provided:  -- BPH with partial/complete urinary obstruction  -- Other - I will add my own diagnosis  -- Disagree - Not applicable / Not valid  -- Disagree - Clinically unable to determine / Unknown  -- Refer to Clinical Documentation Reviewer    PROVIDER RESPONSE TEXT:    This patient has BPH with partial/complete urinary obstruction.    Query created by: Jose Juan Wilder on 2025 7:42 AM      Electronically signed by:  Leighann SIERRA MD 2025 1:48 PM

## 2025-08-01 ENCOUNTER — HOSPITAL ENCOUNTER (EMERGENCY)
Age: 50
Discharge: HOME | End: 2025-08-01
Attending: EMERGENCY MEDICINE
Payer: MEDICARE

## 2025-08-01 ENCOUNTER — APPOINTMENT (OUTPATIENT)
Dept: CT IMAGING | Age: 50
End: 2025-08-01
Payer: MEDICARE

## 2025-08-01 VITALS
HEIGHT: 70 IN | BODY MASS INDEX: 29.35 KG/M2 | OXYGEN SATURATION: 99 % | HEART RATE: 57 BPM | DIASTOLIC BLOOD PRESSURE: 57 MMHG | RESPIRATION RATE: 10 BRPM | SYSTOLIC BLOOD PRESSURE: 102 MMHG | WEIGHT: 205 LBS

## 2025-08-01 DIAGNOSIS — N17.9 ACUTE KIDNEY INJURY: ICD-10-CM

## 2025-08-01 DIAGNOSIS — R55 SYNCOPE AND COLLAPSE: Primary | ICD-10-CM

## 2025-08-01 LAB
ALBUMIN SERPL ELPH-MCNC: 3.5 G/DL (ref 3.1–4.9)
ALBUMIN SERPL-MCNC: 4.1 G/DL (ref 3.4–5)
ALBUMIN/GLOB SERPL: 1.4 {RATIO} (ref 1.1–2.2)
ALP SERPL-CCNC: 108 U/L (ref 40–129)
ALPHA1 GLOB SERPL ELPH-MCNC: 0.4 G/DL (ref 0.2–0.4)
ALPHA2 GLOB SERPL ELPH-MCNC: 0.9 G/DL (ref 0.4–1.1)
ALT SERPL-CCNC: 16 U/L (ref 10–40)
ANION GAP SERPL CALCULATED.3IONS-SCNC: 13 MMOL/L (ref 3–16)
AST SERPL-CCNC: 25 U/L (ref 15–37)
B-GLOBULIN SERPL ELPH-MCNC: 1.1 G/DL (ref 0.9–1.6)
BASOPHILS # BLD: 0.1 K/UL (ref 0–0.2)
BASOPHILS NFR BLD: 0.5 %
BILIRUB SERPL-MCNC: 0.4 MG/DL (ref 0–1)
BUN SERPL-MCNC: 20 MG/DL (ref 7–20)
CALCIUM SERPL-MCNC: 9.7 MG/DL (ref 8.3–10.6)
CHLORIDE SERPL-SCNC: 101 MMOL/L (ref 99–110)
CO2 SERPL-SCNC: 22 MMOL/L (ref 21–32)
CREAT SERPL-MCNC: 1.6 MG/DL (ref 0.9–1.3)
DEPRECATED RDW RBC AUTO: 17.3 % (ref 12.4–15.4)
EOSINOPHIL # BLD: 0.2 K/UL (ref 0–0.6)
EOSINOPHIL NFR BLD: 2 %
GAMMA GLOB SERPL ELPH-MCNC: 0.9 G/DL (ref 0.6–1.8)
GFR SERPLBLD CREATININE-BSD FMLA CKD-EPI: 52 ML/MIN/{1.73_M2}
GLUCOSE SERPL-MCNC: 118 MG/DL (ref 70–99)
HCT VFR BLD AUTO: 37.3 % (ref 40.5–52.5)
HGB BLD-MCNC: 12.3 G/DL (ref 13.5–17.5)
LYMPHOCYTES # BLD: 2.6 K/UL (ref 1–5.1)
LYMPHOCYTES NFR BLD: 22.5 %
MCH RBC QN AUTO: 28.7 PG (ref 26–34)
MCHC RBC AUTO-ENTMCNC: 32.9 G/DL (ref 31–36)
MCV RBC AUTO: 87.2 FL (ref 80–100)
MONOCYTES # BLD: 0.9 K/UL (ref 0–1.3)
MONOCYTES NFR BLD: 8 %
NEUTROPHILS # BLD: 7.9 K/UL (ref 1.7–7.7)
NEUTROPHILS NFR BLD: 67 %
PLATELET # BLD AUTO: 246 K/UL (ref 135–450)
PMV BLD AUTO: 8.6 FL (ref 5–10.5)
POTASSIUM SERPL-SCNC: 3.8 MMOL/L (ref 3.5–5.1)
PROT SERPL-MCNC: 6.7 G/DL (ref 6.4–8.2)
PROT SERPL-MCNC: 7 G/DL (ref 6.4–8.2)
RBC # BLD AUTO: 4.27 M/UL (ref 4.2–5.9)
SODIUM SERPL-SCNC: 136 MMOL/L (ref 136–145)
SPE/IFE INTERPRETATION: NORMAL
TROPONIN, HIGH SENSITIVITY: 19 NG/L (ref 0–22)
TROPONIN, HIGH SENSITIVITY: 21 NG/L (ref 0–22)
WBC # BLD AUTO: 11.8 K/UL (ref 4–11)

## 2025-08-01 PROCEDURE — 84484 ASSAY OF TROPONIN QUANT: CPT

## 2025-08-01 PROCEDURE — 36415 COLL VENOUS BLD VENIPUNCTURE: CPT

## 2025-08-01 PROCEDURE — 99284 EMERGENCY DEPT VISIT MOD MDM: CPT

## 2025-08-01 PROCEDURE — 80053 COMPREHEN METABOLIC PANEL: CPT

## 2025-08-01 PROCEDURE — 93005 ELECTROCARDIOGRAM TRACING: CPT | Performed by: EMERGENCY MEDICINE

## 2025-08-01 PROCEDURE — 2580000003 HC RX 258: Performed by: EMERGENCY MEDICINE

## 2025-08-01 PROCEDURE — 70450 CT HEAD/BRAIN W/O DYE: CPT

## 2025-08-01 PROCEDURE — 85025 COMPLETE CBC W/AUTO DIFF WBC: CPT

## 2025-08-01 RX ORDER — 0.9 % SODIUM CHLORIDE 0.9 %
1000 INTRAVENOUS SOLUTION INTRAVENOUS ONCE
Status: COMPLETED | OUTPATIENT
Start: 2025-08-01 | End: 2025-08-01

## 2025-08-01 RX ADMIN — SODIUM CHLORIDE 1000 ML: 0.9 INJECTION, SOLUTION INTRAVENOUS at 05:17

## 2025-08-01 ASSESSMENT — PAIN DESCRIPTION - FREQUENCY: FREQUENCY: INTERMITTENT

## 2025-08-01 ASSESSMENT — PAIN DESCRIPTION - LOCATION: LOCATION: HIP;BACK

## 2025-08-01 ASSESSMENT — PAIN DESCRIPTION - DESCRIPTORS: DESCRIPTORS: ACHING

## 2025-08-01 ASSESSMENT — PAIN - FUNCTIONAL ASSESSMENT: PAIN_FUNCTIONAL_ASSESSMENT: 0-10

## 2025-08-01 ASSESSMENT — PAIN SCALES - GENERAL: PAINLEVEL_OUTOF10: 8

## 2025-08-01 NOTE — ED PROVIDER NOTES
[facility]  EMERGENCY DEPARTMENT ENCOUNTER        Pt Name: Osvaldo Noble  MRN: [id number]  Birthdate 1975  Date of evaluation: 8/1/2025  Provider: Keli Elaine MD  PCP: Leo Howard DO  Note Started: 4:26 AM EDT 8/1/25    CHIEF COMPLAINT       Chief Complaint   Patient presents with    Anxiety     Patient states he was in his chair at 2230 and woke up at 0230, patient states he passed out, he feels like his legs and arms are heavy. Patients went to his neighbors to have them call 911.        HISTORY OF PRESENT ILLNESS: 1 or more Elements     History from : Patient and EMS    Limitations to history : None    Osvaldo Noble is a 49 y.o. male who presents emergency department with unconsciousness.  Patient states that he was sitting in his chair flipping through TV.  The next thing he knew he woke up around 2:30 in the morning.  He states that last or member sitting in a chair at 10 PM.  He is convinced that he passed out.  He could not find his phone and he was able to get up and walk on his own to his neighbor's house and have the neighbor called 911.  Patient states that he has had tremors diffusely in arms and legs since spinal surgery several months ago.  He has a history of alcohol abuse and states that he has dementia \"related to that.\"  He states he had 2 episodes of falling over the weekend due to the jerking in his legs and passing out.  He states he has had these jerking episodes for quite a while and normally when they happen he remembers them.  Recently he has started not remembering the episodes and losing consciousness.  He is concerned he has been having seizures.    Nursing Notes were all reviewed and agreed with or any disagreements were addressed in the HPI.    REVIEW OF SYSTEMS :      Review of Systems    10 systems reviewed and negative except as in HPI/MDM    SURGICAL HISTORY     Past Surgical History:   Procedure Laterality Date    CERVICAL SPINE

## 2025-08-01 NOTE — ED TRIAGE NOTES
Patient states he was in his chair at 2230 and woke up at 0230, patient states he passed out, he feels like his legs and arms are heavy. Patients went to his neighbors to have them call 911.

## 2025-08-02 LAB
EKG ATRIAL RATE: 65 BPM
EKG DIAGNOSIS: NORMAL
EKG P AXIS: 48 DEGREES
EKG P-R INTERVAL: 178 MS
EKG Q-T INTERVAL: 396 MS
EKG QRS DURATION: 98 MS
EKG QTC CALCULATION (BAZETT): 411 MS
EKG R AXIS: -8 DEGREES
EKG T AXIS: 58 DEGREES
EKG VENTRICULAR RATE: 65 BPM

## 2025-08-02 PROCEDURE — 93010 ELECTROCARDIOGRAM REPORT: CPT | Performed by: INTERNAL MEDICINE

## 2025-08-05 PROBLEM — R56.9 CONVULSIONS (HCC): Status: ACTIVE | Noted: 2025-08-05

## 2025-08-18 ENCOUNTER — TELEPHONE (OUTPATIENT)
Dept: NEUROLOGY | Age: 50
End: 2025-08-18

## 2025-08-18 DIAGNOSIS — G25.81 RESTLESS LEG SYNDROME: Primary | ICD-10-CM

## 2025-08-18 DIAGNOSIS — F02.B0 MODERATE DEMENTIA ASSOCIATED WITH OTHER UNDERLYING DISEASE, UNSPECIFIED WHETHER BEHAVIORAL, PSYCHOTIC, OR MOOD DISTURBANCE OR ANXIETY (HCC): ICD-10-CM

## 2025-08-18 DIAGNOSIS — F04 WERNICKE-KORSAKOFF SYNDROME: ICD-10-CM

## 2025-08-19 RX ORDER — ROPINIROLE 1 MG/1
1 TABLET, FILM COATED ORAL NIGHTLY
Qty: 90 TABLET | Refills: 0 | Status: SHIPPED | OUTPATIENT
Start: 2025-08-19

## 2025-08-19 RX ORDER — GALANTAMINE 4 MG/1
4 TABLET, FILM COATED ORAL 2 TIMES DAILY
Qty: 60 TABLET | Refills: 2 | Status: SHIPPED | OUTPATIENT
Start: 2025-08-19

## (undated) DEVICE — YANKAUER,BULB TIP,W/O VENT,RIGID,STERILE: Brand: MEDLINE

## (undated) DEVICE — CONMED SCOPE SAVER BITE BLOCK, 20X27 MM: Brand: SCOPE SAVER

## (undated) DEVICE — FORCEP BX STD CAP 240CM RAD JAW 4

## (undated) DEVICE — CANNULA,OXY,ADULT,SUPERSOFT,W/7'TUB,SC: Brand: MEDLINE INDUSTRIES, INC.

## (undated) DEVICE — ENDOSCOPIC KIT 2 12 FT OP4 DE2 GWN SYR